# Patient Record
Sex: MALE | Race: WHITE | HISPANIC OR LATINO | Employment: UNEMPLOYED | URBAN - METROPOLITAN AREA
[De-identification: names, ages, dates, MRNs, and addresses within clinical notes are randomized per-mention and may not be internally consistent; named-entity substitution may affect disease eponyms.]

---

## 2017-04-11 ENCOUNTER — APPOINTMENT (OUTPATIENT)
Dept: RADIOLOGY | Facility: MEDICAL CENTER | Age: 67
DRG: 269 | End: 2017-04-11
Attending: ANESTHESIOLOGY
Payer: MEDICAID

## 2017-04-11 ENCOUNTER — APPOINTMENT (OUTPATIENT)
Dept: RADIOLOGY | Facility: MEDICAL CENTER | Age: 67
DRG: 269 | End: 2017-04-11
Attending: EMERGENCY MEDICINE
Payer: MEDICAID

## 2017-04-11 ENCOUNTER — HOSPITAL ENCOUNTER (INPATIENT)
Facility: MEDICAL CENTER | Age: 67
LOS: 10 days | DRG: 269 | End: 2017-04-21
Attending: EMERGENCY MEDICINE | Admitting: SURGERY
Payer: MEDICAID

## 2017-04-11 ENCOUNTER — HOSPITAL ENCOUNTER (OUTPATIENT)
Dept: RADIOLOGY | Facility: MEDICAL CENTER | Age: 67
End: 2017-04-11
Payer: MEDICAID

## 2017-04-11 DIAGNOSIS — I71.40 ABDOMINAL AORTIC ANEURYSM (AAA) WITHOUT RUPTURE (HCC): ICD-10-CM

## 2017-04-11 PROBLEM — I71.9 AORTIC ANEURYSM WITHOUT RUPTURE (HCC): Status: ACTIVE | Noted: 2017-04-11

## 2017-04-11 LAB
ABO GROUP BLD: NORMAL
ABO GROUP BLD: NORMAL
ALBUMIN SERPL BCP-MCNC: 3.9 G/DL (ref 3.2–4.9)
ALBUMIN/GLOB SERPL: 1.1 G/DL
ALP SERPL-CCNC: 94 U/L (ref 30–99)
ALT SERPL-CCNC: 15 U/L (ref 2–50)
ANION GAP SERPL CALC-SCNC: 9 MMOL/L (ref 0–11.9)
APTT PPP: 26.4 SEC (ref 24.7–36)
AST SERPL-CCNC: 14 U/L (ref 12–45)
BASOPHILS # BLD AUTO: 0.5 % (ref 0–1.8)
BASOPHILS # BLD: 0.04 K/UL (ref 0–0.12)
BILIRUB SERPL-MCNC: 0.6 MG/DL (ref 0.1–1.5)
BLD GP AB SCN SERPL QL: NORMAL
BUN SERPL-MCNC: 20 MG/DL (ref 8–22)
CALCIUM SERPL-MCNC: 9 MG/DL (ref 8.5–10.5)
CHLORIDE SERPL-SCNC: 103 MMOL/L (ref 96–112)
CO2 SERPL-SCNC: 26 MMOL/L (ref 20–33)
CREAT SERPL-MCNC: 1.08 MG/DL (ref 0.5–1.4)
EKG IMPRESSION: NORMAL
EOSINOPHIL # BLD AUTO: 0.18 K/UL (ref 0–0.51)
EOSINOPHIL NFR BLD: 2.1 % (ref 0–6.9)
ERYTHROCYTE [DISTWIDTH] IN BLOOD BY AUTOMATED COUNT: 41.3 FL (ref 35.9–50)
GFR SERPL CREATININE-BSD FRML MDRD: >60 ML/MIN/1.73 M 2
GLOBULIN SER CALC-MCNC: 3.6 G/DL (ref 1.9–3.5)
GLUCOSE SERPL-MCNC: 96 MG/DL (ref 65–99)
HCT VFR BLD AUTO: 48.2 % (ref 42–52)
HGB BLD-MCNC: 16.2 G/DL (ref 14–18)
IMM GRANULOCYTES # BLD AUTO: 0.02 K/UL (ref 0–0.11)
IMM GRANULOCYTES NFR BLD AUTO: 0.2 % (ref 0–0.9)
INR PPP: 1.03 (ref 0.87–1.13)
LYMPHOCYTES # BLD AUTO: 2.13 K/UL (ref 1–4.8)
LYMPHOCYTES NFR BLD: 25 % (ref 22–41)
MCH RBC QN AUTO: 28.4 PG (ref 27–33)
MCHC RBC AUTO-ENTMCNC: 33.6 G/DL (ref 33.7–35.3)
MCV RBC AUTO: 84.6 FL (ref 81.4–97.8)
MONOCYTES # BLD AUTO: 0.71 K/UL (ref 0–0.85)
MONOCYTES NFR BLD AUTO: 8.3 % (ref 0–13.4)
NEUTROPHILS # BLD AUTO: 5.44 K/UL (ref 1.82–7.42)
NEUTROPHILS NFR BLD: 63.9 % (ref 44–72)
NRBC # BLD AUTO: 0 K/UL
NRBC BLD AUTO-RTO: 0 /100 WBC
PLATELET # BLD AUTO: 157 K/UL (ref 164–446)
PMV BLD AUTO: 12.5 FL (ref 9–12.9)
POTASSIUM SERPL-SCNC: 3.5 MMOL/L (ref 3.6–5.5)
PROT SERPL-MCNC: 7.5 G/DL (ref 6–8.2)
PROTHROMBIN TIME: 13.8 SEC (ref 12–14.6)
RBC # BLD AUTO: 5.7 M/UL (ref 4.7–6.1)
RH BLD: NORMAL
SODIUM SERPL-SCNC: 138 MMOL/L (ref 135–145)
WBC # BLD AUTO: 8.5 K/UL (ref 4.8–10.8)

## 2017-04-11 PROCEDURE — 93005 ELECTROCARDIOGRAM TRACING: CPT | Performed by: EMERGENCY MEDICINE

## 2017-04-11 PROCEDURE — 503001 HCHG PERFUSION: Performed by: SURGERY

## 2017-04-11 PROCEDURE — 110382 HCHG SHELL REV 271: Performed by: SURGERY

## 2017-04-11 PROCEDURE — 04C00ZZ EXTIRPATION OF MATTER FROM ABDOMINAL AORTA, OPEN APPROACH: ICD-10-PCS | Performed by: SURGERY

## 2017-04-11 PROCEDURE — 110371 HCHG SHELL REV 272: Performed by: SURGERY

## 2017-04-11 PROCEDURE — 302128 INFUSION PUMP: Performed by: EMERGENCY MEDICINE

## 2017-04-11 PROCEDURE — 500122 HCHG BOVIE, BLADE: Performed by: SURGERY

## 2017-04-11 PROCEDURE — 96366 THER/PROPH/DIAG IV INF ADDON: CPT

## 2017-04-11 PROCEDURE — 700105 HCHG RX REV CODE 258: Performed by: EMERGENCY MEDICINE

## 2017-04-11 PROCEDURE — 500424 HCHG DRESSING, AIRSTRIP: Performed by: SURGERY

## 2017-04-11 PROCEDURE — 160009 HCHG ANES TIME/MIN: Performed by: SURGERY

## 2017-04-11 PROCEDURE — 82330 ASSAY OF CALCIUM: CPT

## 2017-04-11 PROCEDURE — 71010 DX-CHEST-LIMITED (1 VIEW): CPT

## 2017-04-11 PROCEDURE — 700111 HCHG RX REV CODE 636 W/ 250 OVERRIDE (IP): Performed by: EMERGENCY MEDICINE

## 2017-04-11 PROCEDURE — 160035 HCHG PACU - 1ST 60 MINS PHASE I: Performed by: SURGERY

## 2017-04-11 PROCEDURE — 501498 HCHG SURG-I-LOOP, MAXI (BLUE): Performed by: SURGERY

## 2017-04-11 PROCEDURE — 700111 HCHG RX REV CODE 636 W/ 250 OVERRIDE (IP)

## 2017-04-11 PROCEDURE — L8670 VASCULAR GRAFT, SYNTHETIC: HCPCS | Performed by: SURGERY

## 2017-04-11 PROCEDURE — 500043 HCHG BAG-A-JET: Performed by: SURGERY

## 2017-04-11 PROCEDURE — 700101 HCHG RX REV CODE 250

## 2017-04-11 PROCEDURE — 36415 COLL VENOUS BLD VENIPUNCTURE: CPT

## 2017-04-11 PROCEDURE — 160022 HCHG BLOCK: Performed by: SURGERY

## 2017-04-11 PROCEDURE — 700111 HCHG RX REV CODE 636 W/ 250 OVERRIDE (IP): Performed by: SURGERY

## 2017-04-11 PROCEDURE — 501445 HCHG STAPLER, SKIN DISP: Performed by: SURGERY

## 2017-04-11 PROCEDURE — 502240 HCHG MISC OR SUPPLY RC 0272: Performed by: SURGERY

## 2017-04-11 PROCEDURE — 500700 HCHG HEMOCLIP, SMALL (RED): Performed by: SURGERY

## 2017-04-11 PROCEDURE — 501837 HCHG SUTURE CV: Performed by: SURGERY

## 2017-04-11 PROCEDURE — 160031 HCHG SURGERY MINUTES - 1ST 30 MINS LEVEL 5: Performed by: SURGERY

## 2017-04-11 PROCEDURE — 160002 HCHG RECOVERY MINUTES (STAT): Performed by: SURGERY

## 2017-04-11 PROCEDURE — 500257: Performed by: SURGERY

## 2017-04-11 PROCEDURE — 85014 HEMATOCRIT: CPT

## 2017-04-11 PROCEDURE — C1894 INTRO/SHEATH, NON-LASER: HCPCS | Performed by: SURGERY

## 2017-04-11 PROCEDURE — 80053 COMPREHEN METABOLIC PANEL: CPT

## 2017-04-11 PROCEDURE — 501561 HCHG TRAY, EPIDURAL SINGLE SHOT: Performed by: SURGERY

## 2017-04-11 PROCEDURE — 501422 HCHG SPONGE, SURGIFOAM 100: Performed by: SURGERY

## 2017-04-11 PROCEDURE — 700105 HCHG RX REV CODE 258: Performed by: ANESTHESIOLOGY

## 2017-04-11 PROCEDURE — 700111 HCHG RX REV CODE 636 W/ 250 OVERRIDE (IP): Performed by: ANESTHESIOLOGY

## 2017-04-11 PROCEDURE — 500698 HCHG HEMOCLIP, MEDIUM: Performed by: SURGERY

## 2017-04-11 PROCEDURE — 501838 HCHG SUTURE GENERAL: Performed by: SURGERY

## 2017-04-11 PROCEDURE — 96365 THER/PROPH/DIAG IV INF INIT: CPT

## 2017-04-11 PROCEDURE — 86850 RBC ANTIBODY SCREEN: CPT

## 2017-04-11 PROCEDURE — 84295 ASSAY OF SERUM SODIUM: CPT

## 2017-04-11 PROCEDURE — 160048 HCHG OR STATISTICAL LEVEL 1-5: Performed by: SURGERY

## 2017-04-11 PROCEDURE — 74175 CTA ABDOMEN W/CONTRAST: CPT

## 2017-04-11 PROCEDURE — 85730 THROMBOPLASTIN TIME PARTIAL: CPT

## 2017-04-11 PROCEDURE — 85025 COMPLETE CBC W/AUTO DIFF WBC: CPT

## 2017-04-11 PROCEDURE — 770022 HCHG ROOM/CARE - ICU (200)

## 2017-04-11 PROCEDURE — 82947 ASSAY GLUCOSE BLOOD QUANT: CPT

## 2017-04-11 PROCEDURE — 500887 HCHG PACK, MAJOR BASIN: Performed by: SURGERY

## 2017-04-11 PROCEDURE — 160036 HCHG PACU - EA ADDL 30 MINS PHASE I: Performed by: SURGERY

## 2017-04-11 PROCEDURE — 04S90ZZ REPOSITION RIGHT RENAL ARTERY, OPEN APPROACH: ICD-10-PCS | Performed by: SURGERY

## 2017-04-11 PROCEDURE — 501499 HCHG SURG-I-LOOP, MINI (BLUE): Performed by: SURGERY

## 2017-04-11 PROCEDURE — 700117 HCHG RX CONTRAST REV CODE 255: Performed by: EMERGENCY MEDICINE

## 2017-04-11 PROCEDURE — 71010 DX-CHEST-PORTABLE (1 VIEW): CPT

## 2017-04-11 PROCEDURE — 86901 BLOOD TYPING SEROLOGIC RH(D): CPT

## 2017-04-11 PROCEDURE — 85610 PROTHROMBIN TIME: CPT

## 2017-04-11 PROCEDURE — 500697 HCHG HEMOCLIP, LARGE (ORANGE): Performed by: SURGERY

## 2017-04-11 PROCEDURE — 86900 BLOOD TYPING SEROLOGIC ABO: CPT

## 2017-04-11 PROCEDURE — A4606 OXYGEN PROBE USED W OXIMETER: HCPCS | Performed by: SURGERY

## 2017-04-11 PROCEDURE — 84132 ASSAY OF SERUM POTASSIUM: CPT

## 2017-04-11 PROCEDURE — 99291 CRITICAL CARE FIRST HOUR: CPT

## 2017-04-11 PROCEDURE — 501500 HCHG SURG-I-LOOP, SUPER MAXI (BLUE): Performed by: SURGERY

## 2017-04-11 PROCEDURE — 04SA0ZZ REPOSITION LEFT RENAL ARTERY, OPEN APPROACH: ICD-10-PCS | Performed by: SURGERY

## 2017-04-11 PROCEDURE — 160042 HCHG SURGERY MINUTES - EA ADDL 1 MIN LEVEL 5: Performed by: SURGERY

## 2017-04-11 PROCEDURE — 04100Z6 BYPASS ABDOMINAL AORTA TO RIGHT COMMON ILIAC ARTERY, OPEN APPROACH: ICD-10-PCS | Performed by: SURGERY

## 2017-04-11 PROCEDURE — 82803 BLOOD GASES ANY COMBINATION: CPT

## 2017-04-11 DEVICE — IMPLANTABLE DEVICE: Type: IMPLANTABLE DEVICE | Status: FUNCTIONAL

## 2017-04-11 RX ORDER — SODIUM CHLORIDE, SODIUM LACTATE, POTASSIUM CHLORIDE, CALCIUM CHLORIDE 600; 310; 30; 20 MG/100ML; MG/100ML; MG/100ML; MG/100ML
250 INJECTION, SOLUTION INTRAVENOUS PRN
Status: DISCONTINUED | OUTPATIENT
Start: 2017-04-11 | End: 2017-04-17

## 2017-04-11 RX ORDER — DIPHENHYDRAMINE HCL 25 MG
12.5 TABLET ORAL EVERY 6 HOURS PRN
Status: DISCONTINUED | OUTPATIENT
Start: 2017-04-11 | End: 2017-04-17

## 2017-04-11 RX ORDER — METOPROLOL TARTRATE 100 MG/1
100 TABLET ORAL 2 TIMES DAILY
Status: ON HOLD | COMMUNITY
End: 2019-08-23 | Stop reason: SDUPTHER

## 2017-04-11 RX ORDER — DIPHENHYDRAMINE HYDROCHLORIDE 50 MG/ML
12.5 INJECTION INTRAMUSCULAR; INTRAVENOUS EVERY 6 HOURS PRN
Status: DISCONTINUED | OUTPATIENT
Start: 2017-04-11 | End: 2017-04-17

## 2017-04-11 RX ORDER — NIFEDIPINE 30 MG
30 TABLET, EXTENDED RELEASE ORAL EVERY EVENING
COMMUNITY
End: 2017-05-09

## 2017-04-11 RX ORDER — MIDAZOLAM HYDROCHLORIDE 1 MG/ML
INJECTION INTRAMUSCULAR; INTRAVENOUS
Status: COMPLETED
Start: 2017-04-11 | End: 2017-04-11

## 2017-04-11 RX ORDER — SODIUM CHLORIDE 9 MG/ML
1000 INJECTION, SOLUTION INTRAVENOUS ONCE
Status: COMPLETED | OUTPATIENT
Start: 2017-04-11 | End: 2017-04-11

## 2017-04-11 RX ORDER — DIPHENHYDRAMINE HYDROCHLORIDE 50 MG/ML
25 INJECTION INTRAMUSCULAR; INTRAVENOUS EVERY 6 HOURS PRN
Status: DISCONTINUED | OUTPATIENT
Start: 2017-04-11 | End: 2017-04-13

## 2017-04-11 RX ORDER — LABETALOL HYDROCHLORIDE 5 MG/ML
10 INJECTION, SOLUTION INTRAVENOUS
Status: DISCONTINUED | OUTPATIENT
Start: 2017-04-11 | End: 2017-04-21 | Stop reason: HOSPADM

## 2017-04-11 RX ORDER — ACETAMINOPHEN 650 MG/1
975 SUPPOSITORY RECTAL EVERY 6 HOURS
Status: DISCONTINUED | OUTPATIENT
Start: 2017-04-11 | End: 2017-04-14

## 2017-04-11 RX ORDER — ONDANSETRON 2 MG/ML
4 INJECTION INTRAMUSCULAR; INTRAVENOUS EVERY 4 HOURS PRN
Status: DISCONTINUED | OUTPATIENT
Start: 2017-04-11 | End: 2017-04-11

## 2017-04-11 RX ORDER — ASPIRIN 325 MG
325 TABLET ORAL DAILY
COMMUNITY

## 2017-04-11 RX ORDER — ESMOLOL HYDROCHLORIDE 10 MG/ML
50 INJECTION, SOLUTION INTRAVENOUS CONTINUOUS
Status: DISCONTINUED | OUTPATIENT
Start: 2017-04-11 | End: 2017-04-12

## 2017-04-11 RX ORDER — ACETAMINOPHEN 500 MG
1000 TABLET ORAL EVERY 6 HOURS
Status: DISCONTINUED | OUTPATIENT
Start: 2017-04-11 | End: 2017-04-17

## 2017-04-11 RX ORDER — NALOXONE HYDROCHLORIDE 0.4 MG/ML
0.1 INJECTION, SOLUTION INTRAMUSCULAR; INTRAVENOUS; SUBCUTANEOUS
Status: DISCONTINUED | OUTPATIENT
Start: 2017-04-11 | End: 2017-04-17

## 2017-04-11 RX ORDER — DEXTROSE, SODIUM CHLORIDE, SODIUM LACTATE, POTASSIUM CHLORIDE, AND CALCIUM CHLORIDE 5; .6; .31; .03; .02 G/100ML; G/100ML; G/100ML; G/100ML; G/100ML
INJECTION, SOLUTION INTRAVENOUS CONTINUOUS
Status: DISCONTINUED | OUTPATIENT
Start: 2017-04-11 | End: 2017-04-19

## 2017-04-11 RX ORDER — ENALAPRIL MALEATE 10 MG/1
10 TABLET ORAL 2 TIMES DAILY
COMMUNITY
End: 2017-08-14

## 2017-04-11 RX ORDER — DEXAMETHASONE SODIUM PHOSPHATE 4 MG/ML
4 INJECTION, SOLUTION INTRA-ARTICULAR; INTRALESIONAL; INTRAMUSCULAR; INTRAVENOUS; SOFT TISSUE
Status: COMPLETED | OUTPATIENT
Start: 2017-04-11 | End: 2017-04-12

## 2017-04-11 RX ORDER — FAMOTIDINE 20 MG/1
20 TABLET, FILM COATED ORAL EVERY 12 HOURS
Status: DISCONTINUED | OUTPATIENT
Start: 2017-04-11 | End: 2017-04-15

## 2017-04-11 RX ORDER — SODIUM CHLORIDE, SODIUM LACTATE, POTASSIUM CHLORIDE, CALCIUM CHLORIDE 600; 310; 30; 20 MG/100ML; MG/100ML; MG/100ML; MG/100ML
1000 INJECTION, SOLUTION INTRAVENOUS ONCE
Status: COMPLETED | OUTPATIENT
Start: 2017-04-11 | End: 2017-04-11

## 2017-04-11 RX ORDER — ONDANSETRON 2 MG/ML
4 INJECTION INTRAMUSCULAR; INTRAVENOUS EVERY 4 HOURS PRN
Status: DISCONTINUED | OUTPATIENT
Start: 2017-04-11 | End: 2017-04-21 | Stop reason: HOSPADM

## 2017-04-11 RX ORDER — HALOPERIDOL 5 MG/ML
1 INJECTION INTRAMUSCULAR EVERY 6 HOURS PRN
Status: DISCONTINUED | OUTPATIENT
Start: 2017-04-11 | End: 2017-04-17

## 2017-04-11 RX ORDER — HYDRALAZINE HYDROCHLORIDE 20 MG/ML
10 INJECTION INTRAMUSCULAR; INTRAVENOUS
Status: DISCONTINUED | OUTPATIENT
Start: 2017-04-11 | End: 2017-04-21 | Stop reason: HOSPADM

## 2017-04-11 RX ORDER — SCOLOPAMINE TRANSDERMAL SYSTEM 1 MG/1
1 PATCH, EXTENDED RELEASE TRANSDERMAL
Status: DISCONTINUED | OUTPATIENT
Start: 2017-04-11 | End: 2017-04-17

## 2017-04-11 RX ORDER — SODIUM CHLORIDE 9 MG/ML
INJECTION, SOLUTION INTRAVENOUS
Status: DISCONTINUED | OUTPATIENT
Start: 2017-04-11 | End: 2017-04-11 | Stop reason: HOSPADM

## 2017-04-11 RX ORDER — LOSARTAN POTASSIUM 100 MG/1
100 TABLET ORAL EVERY EVENING
COMMUNITY
End: 2017-05-09

## 2017-04-11 RX ORDER — SODIUM CHLORIDE, SODIUM LACTATE, POTASSIUM CHLORIDE, CALCIUM CHLORIDE 600; 310; 30; 20 MG/100ML; MG/100ML; MG/100ML; MG/100ML
500 INJECTION, SOLUTION INTRAVENOUS CONTINUOUS
Status: DISCONTINUED | OUTPATIENT
Start: 2017-04-12 | End: 2017-04-13

## 2017-04-11 RX ADMIN — FENTANYL CITRATE 50 MCG: 50 INJECTION, SOLUTION INTRAMUSCULAR; INTRAVENOUS at 20:30

## 2017-04-11 RX ADMIN — SODIUM CHLORIDE, SODIUM LACTATE, POTASSIUM CHLORIDE, CALCIUM CHLORIDE 1000 ML: 600; 310; 30; 20 INJECTION, SOLUTION INTRAVENOUS at 23:00

## 2017-04-11 RX ADMIN — HYDROMORPHONE HYDROCHLORIDE 0.5 MG: 1 INJECTION, SOLUTION INTRAMUSCULAR; INTRAVENOUS; SUBCUTANEOUS at 21:25

## 2017-04-11 RX ADMIN — HYDROMORPHONE HYDROCHLORIDE 0.5 MG: 1 INJECTION, SOLUTION INTRAMUSCULAR; INTRAVENOUS; SUBCUTANEOUS at 21:45

## 2017-04-11 RX ADMIN — FENTANYL CITRATE 50 MCG: 50 INJECTION, SOLUTION INTRAMUSCULAR; INTRAVENOUS at 21:10

## 2017-04-11 RX ADMIN — HYDROMORPHONE HYDROCHLORIDE 0.5 MG: 1 INJECTION, SOLUTION INTRAMUSCULAR; INTRAVENOUS; SUBCUTANEOUS at 21:19

## 2017-04-11 RX ADMIN — FENTANYL CITRATE 50 MCG: 50 INJECTION, SOLUTION INTRAMUSCULAR; INTRAVENOUS at 20:35

## 2017-04-11 RX ADMIN — MIDAZOLAM 2 MG: 1 INJECTION INTRAMUSCULAR; INTRAVENOUS at 22:45

## 2017-04-11 RX ADMIN — SODIUM CHLORIDE 1000 ML: 9 INJECTION, SOLUTION INTRAVENOUS at 11:12

## 2017-04-11 RX ADMIN — ESMOLOL HYDROCHLORIDE 50 MCG/KG/MIN: 10 INJECTION INTRAVENOUS at 11:20

## 2017-04-11 RX ADMIN — FENTANYL CITRATE 50 MCG: 50 INJECTION, SOLUTION INTRAMUSCULAR; INTRAVENOUS at 21:05

## 2017-04-11 RX ADMIN — ROPIVACAINE HYDROCHLORIDE: 10 INJECTION, SOLUTION EPIDURAL at 20:50

## 2017-04-11 RX ADMIN — IOHEXOL 100 ML: 350 INJECTION, SOLUTION INTRAVENOUS at 11:38

## 2017-04-11 RX ADMIN — ROPIVACAINE HYDROCHLORIDE: 10 INJECTION, SOLUTION EPIDURAL at 23:50

## 2017-04-11 RX ADMIN — HYDROMORPHONE HYDROCHLORIDE 0.5 MG: 1 INJECTION, SOLUTION INTRAMUSCULAR; INTRAVENOUS; SUBCUTANEOUS at 21:40

## 2017-04-11 ASSESSMENT — PAIN SCALES - GENERAL
PAINLEVEL_OUTOF10: 9
PAINLEVEL_OUTOF10: 0
PAINLEVEL_OUTOF10: 3
PAINLEVEL_OUTOF10: 2
PAINLEVEL_OUTOF10: 0
PAINLEVEL_OUTOF10: 0
PAINLEVEL_OUTOF10: 9
PAINLEVEL_OUTOF10: 9
PAINLEVEL_OUTOF10: 10

## 2017-04-11 ASSESSMENT — ENCOUNTER SYMPTOMS
ABDOMINAL PAIN: 1
HEADACHES: 0

## 2017-04-11 NOTE — IP AVS SNAPSHOT
4/21/2017    Danny Antony  [address] [address]  [place] [address]    Dear Danny:    [facility] wants to ensure your discharge home is safe and you or your loved ones have had all of your questions answered regarding your care after you leave the hospital.    Below is a list of resources and contact information should you have any questions regarding your hospital stay, follow-up instructions, or active medical symptoms.    Questions or Concerns Regarding… Contact   Medical Questions Related to Your Discharge  (7 days a week, 8am-5pm) Contact a Nurse Care Coordinator   [phone]   Medical Questions Not Related to Your Discharge  (24 hours a day / 7 days a week)  Contact the Nurse Health Line   [phone]    Medications or Discharge Instructions Refer to your discharge packet   or contact your [facility] Primary Care Provider   [phone]   Follow-up Appointment(s) Schedule your appointment via [employer]   or contact Scheduling [phone]   Billing Review your statement via [employer]  or contact Billing [phone]   Medical Records Review your records via [employer]   or contact Medical Records [phone]     You may receive a telephone call within two days of discharge. This call is to make certain you understand your discharge instructions and have the opportunity to have any questions answered. You can also easily access your medical information, test results and upcoming appointments via the [employer] free online health management tool. You can learn more and sign up at [employer]. For assistance setting up your [employer] account, please call [phone].    Once again, we want to ensure your discharge home is safe and that you have a clear understanding of any next steps in your care. If you have any questions or concerns, please do not hesitate to contact us, we are here for you. Thank you for choosing [facility] for your healthcare needs.    Sincerely,    Your [facility] Healthcare Team

## 2017-04-11 NOTE — ED NOTES
To MCGHEE from [facility] called to discuss pt's poc. Pt will remain in ER until pre-op comes for the pt, he will go to [facility] post surgery.

## 2017-04-11 NOTE — IP AVS SNAPSHOT
[employer] Access Code: [email or web address]  Expires: 5/21/2017  5:30 PM    Your email address is not on file at [facility].  Email Addresses are required for you to sign up for [employer], please contact [phone] to verify your personal information and to provide your email address prior to attempting to register for [employer].    Dannybrady Antony  [address] [address]  [place] [address]    [employer]  A secure, online tool to manage your health information     [facility]’s [employer]® is a secure, online tool that connects you to your personalized health information from the privacy of your home -- day or night - making it very easy for you to manage your healthcare. Once the activation process is completed, you can even access your medical information using the [employer] aashish, which is available for free in the [employer] Aashish store or Google Play store.     To learn more about [employer], visit [email or web address]    There are two levels of access available (as shown below):   My Chart Features  [facility] Primary Care Doctor [facility]  Specialists [facility]  Urgent  Care Non-[facility] Primary Care Doctor   Email your healthcare team securely and privately 24/7 X X X    Manage appointments: schedule your next appointment; view details of past/upcoming appointments X      Request prescription refills. X      View recent personal medical records, including lab and immunizations X X X X   View health record, including health history, allergies, medications X X X X   Read reports about your outpatient visits, procedures, consult and ER notes X X X X   See your discharge summary, which is a recap of your hospital and/or ER visit that includes your diagnosis, lab results, and care plan X X  X     How to register for [employer]:  Once your e-mail address has been verified, follow the following steps to sign up for [employer].     1. Go to  [email or web address]  2. Click on the Sign Up Now box, which takes you to the New Member Sign Up page. You will  need to provide the following information:  a. Enter your [employer] Access Code exactly as it appears at the top of this page. (You will not need to use this code after you’ve completed the sign-up process. If you do not sign up before the expiration date, you must request a new code.)   b. Enter your date of birth.   c. Enter your home email address.   d. Click Submit, and follow the next screen’s instructions.  3. Create a [employer] ID. This will be your [employer] login ID and cannot be changed, so think of one that is secure and easy to remember.  4. Create a [employer] password. You can change your password at any time.  5. Enter your Password Reset Question and Answer. This can be used at a later time if you forget your password.   6. Enter your e-mail address. This allows you to receive e-mail notifications when new information is available in [employer].  7. Click Sign Up. You can now view your health information.    For assistance activating your [employer] account, call [phone]

## 2017-04-11 NOTE — IP AVS SNAPSHOT
" <p align=\"LEFT\"><IMG SRC=\"//[email or web address]\" alt=\"Image\" WIDTH=\"50%\" HEIGHT=\"200\" BORDER=\"\"></p>                   Name:Danny Antony  Medical Record Number:[id number]  CSN: [id number]    YOB: 1950   Age: 67 y.o.  Sex: male  HT:1.803 m (5' 11\") WT: 75.3 kg (166 lb 0.1 oz)          Admit Date: 4/11/2017     Discharge Date:   Today's Date: 4/21/2017  Attending Doctor:  Charissa Kerr M.D.                  Allergies:  Review of patient's allergies indicates no known allergies.          Follow-up Information     1. Follow up with Charissa Kerr M.D. In 1 week.    Specialties:  Surgery, Radiology    Contact information    [address] [address]  [phone]           Medication List      Take these Medications        Instructions    aspirin 325 MG Tabs   Commonly known as:  ASA    Take 325 mg by mouth every 6 hours as needed for Mild Pain.   Dose:  325 mg       enalapril 10 MG Tabs   Commonly known as:  VASOTEC    Take 10 mg by mouth 2 times a day.   Dose:  10 mg       IMDUR PO    Take 5 mg by mouth. Indications: Buys medication in [place]:  called Isorbid   Dose:  5 mg       losartan 100 MG Tabs   Commonly known as:  COZAAR    Take 100 mg by mouth every evening.   Dose:  100 mg       * METOCLOPRAMIDE HCL PO    Take 1 Tab by mouth every evening. Pt is unsure of the dose, pt gets all his medications from [place]   Dose:  1 Tab       * metoclopramide 10 MG Tabs   Commonly known as:  REGLAN    Take 10 mg by mouth 4 times a day.   Dose:  10 mg       metoprolol 100 MG Tabs   Commonly known as:  LOPRESSOR    Take 100 mg by mouth 2 times a day.   Dose:  100 mg       NIFEdipine 30 MG CR tablet   Commonly known as:  ADALAT CC    Take 30 mg by mouth every evening.   Dose:  30 mg       oxycodone-acetaminophen 7.5-325 MG per tablet   Commonly known as:  PERCOCET    Take 1-2 Tabs by mouth every four hours as needed for Moderate Pain.   Dose:  1-2 Tab       PENTOXIFYLLINE PO    Take " 400 mg by mouth 2 Times a Day.   Dose:  400 mg       * Notice:  This list has 2 medication(s) that are the same as other medications prescribed for you. Read the directions carefully, and ask your doctor or other care provider to review them with you.

## 2017-04-11 NOTE — ED NOTES
Pt went to CT, VS remained stable, he has returned to his room family is at the bedside. Xray has been notified of pt's return.

## 2017-04-11 NOTE — ED NOTES
"Med rec updated and complete  Allergies reviewed  Pt gets all of his medications from [place], pt does not know what pharmacy he wants to  from.  Pt states \"No vitamins\".  Pt states \"No antibiotics in the last 30 days\".     "

## 2017-04-11 NOTE — IP AVS SNAPSHOT
" Home Care Instructions                                                                                                                  Name:Danny Antony  Medical Record Number:[id number]  CSN: [id number]    YOB: 1950   Age: 67 y.o.  Sex: male  HT:1.803 m (5' 11\") WT: 75.3 kg (166 lb 0.1 oz)          Admit Date: 4/11/2017     Discharge Date:   Today's Date: 4/21/2017  Attending Doctor:  Charissa Kerr M.D.                  Allergies:  Review of patient's allergies indicates no known allergies.            Discharge Instructions       Discharge Instructions    Discharged to home by car with relative. Discharged via wheelchair, hospital escort: Yes.  Special equipment needed: Not Applicable    Be sure to schedule a follow-up appointment with your primary care doctor or any specialists as instructed.     Discharge Plan:   Diet Plan: Discussed  Activity Level: Discussed  Confirmed Follow up Appointment: Patient to Call and Schedule Appointment  Confirmed Symptoms Management: Discussed  Medication Reconciliation Updated: Yes  Influenza Vaccine Indication: Indicated: 65 years and older  Influenza Vaccine Given - only chart on this line when given: Influenza Vaccine Given (See MAR)    Entiendo que se recomienda kasie dieta baja en colesterol, grasa y sodio para kasie buena skyler. A menos que me hayan dado instrucciones específicas a continuación para otra dieta, acepto esta instrucción sammie receta de mi dieta. Otra dieta: Dieta regular según lo tolerado Instrucciones especiales: Monitorear los signos y síntomas de infección (fiebre, escalofríos, náuseas, vómitos) Monitorear las incisiones de hinchazón, enrojecimiento o drenaje ¿El paciente es Post Transfusión de Mario? Sí INFORMACIÓN DE LA TRANSFUSIÓN POSTERIOR A LA MARIO (ADULTO) El propósito de la transfusión de mario es corregir la anemia, bajos niveles de factores de coagulación sanguínea o corregir la pérdida aguda de mario. La transfusión de mario " es muy ayers, silvia ocasionalmente se producen reacciones adversas inesperadas. La mayoría de las reacciones adversas ocurren lm la transfusión, dentro de un a dos días después de la transfusión o de kasie a dos semanas después de la transfusión. Algunas reacciones adversas pueden ocurrir de harman a seis meses después de la transfusión y algunos incluso años después. Si alguno de los síntomas enumerados a continuación le sucede lm malik transfusión, Por favor notifique a malik enfermera inmediatamente. Fiebre o escalofríos Enrojecimiento de la ale Urticaria, sarpullido o picazón Dificultad para respirar o falta de aire Dolor o exudado de mario del sitio de la aguja IV Dolor lumbar Náuseas o vómitos Debilidad o desmayos Dolor de pecho Mario en la orina Disminución de la frecuencia de la micción Los síntomas anteriores también pueden ocurrir dentro de los 24 a 48 después de la transfusión; Si es así, notifique a malik médico. Color amarillento de la piel [place] puede suceder de harman a seis meses después de la transfusión; Si es así, notifique a malik médico      I understand that a diet low in cholesterol, fat, and sodium is recommended for good health. Unless I have been given specific instructions below for another diet, I accept this instruction as my diet prescription.   Other diet: Regular diet as tolerated    Special Instructions: Monitor for signs and symptoms of infection (fever, chills, nausea, vomiting)  Monitor incisions for swelling, redness, or drainage      · Is patient Post Blood Transfusion?  Yes  POST BLOOD TRANSFUSION INFORMATION (ADULT)    The purpose of blood transfusion is to correct anemia, low levels of blood clotting factors or to correct acute blood loss.   Blood transfusion is very safe but occasionally unexpected adverse reactions do occur. Most adverse reactions occur during transfusion, within one to two days following transfusion or one to two weeks following transfusion. Some adverse reactions  can occur one to six months after transfusion and some even years later.             If any of the symptoms listed below happen to you during your transfusion,                                 please notify your nurse immediately.   · Fever or Chills  · Flushing of the Face  · Hives, rash or itching  · Difficulty in breathing or shortness of breath  · Pain or oozing of blood from the IV needle site  · Low back pain  · Nausea or vomiting  · Weakness or fainting  · Chest pain  · Blood in the urine  · Decreased frequency of urination    The above symptoms may also occur within 24 to 48 after transfusion; if so, notify your physician.     · Yellowing of the skin    This can happen one to six months after transfusion; if so, notify your physician    Angiografía  (Angiogram)  La angiografía es un procedimiento que se utiliza para observar los vasos sanguíneos. En camilo procedimiento, se inyecta kasie sustancia de contraste a través de un tubo luis y colin (catéter) colocado en kasie arteria. Luego se kristin imágenes con jamin X. Con los jamin X se observará si hay kasie obstrucción o un problema en un vaso sanguíneo.   INFORME A BELTRAN MÉDICO:  · Las alergias que tenga, incluidas alergias a mariscos o a sustancias de contraste.  · Todos los medicamentos que utiliza, incluidos vitaminas, hierbas, gotas oftálmicas, cremas y medicamentos de venta prabhu.  · Problemas previos que usted o los miembros de beltran sherlyn hayan tenido con el uso de anestésicos.  · Enfermedades de la mario.  · Cirugías previas.  · Cualquier problema o insuficiencia renal que haya sufrido.  · Enfermedades.  · Posibilidad de embarazo, si corresponde.  RIESGOS Y COMPLICACIONES  En general, la angiografía es un procedimiento seguro. Sin embargo, sammie en cualquier procedimiento, pueden surgir problemas. Estos son algunos posibles problemas:  · Lesiones en los vasos sanguíneos, incluyendo ruptura o sangrado.  · Infección o hematoma en el sitio de inserción del  catéter.  · Reacción alérgica a la sustancia de contraste.  · Lesión renal debido a la tintura o sustancia de contraste.  · Coágulos que pueden conducir a un ictus o a un infarto.  ANTES DEL PROCEDIMIENTO  · No coma ni brannon nada después de la medianoche anterior al procedimiento, o según le haya indicado malik médico.  · Consulte a malik médico si puede beber la cantidad suficiente de agua para ernestina los medicamentos necesarios en la mañana del procedimiento.  PROCEDIMIENTO  · Pueden administrarle un medicamento que lo ayudará a relajarse (sedante) antes y lm el procedimiento. Se lo administrarán a través de un catéter por vía intravenosa (IV) que se inserta en kasie de las venas.  · La momo en la que se insertará el catéter se lava y se rasura. Generalmente se inserta en la tylor, silvia puede insertarse en el pliegue del brazo (cerca del codo) o en la isac.  · Le aplicarán un medicamento para adormecer la momo en la que se insertará el catéter (anestesia local).  · El catéter se insertará en kasie arteria con kasie guía. El catéter se guía utilizando un tipo especial de radiografía (fluoroscopia) del vaso sanguíneo que se examina.  · Luego se inserta kasie sustancia de contraste especial en el catéter y se kristin imágenes de jamin X. Esta sustancia muestra si hay estrechamientos u obstrucciones.  DESPUÉS DEL PROCEDIMIENTO   · Si el procedimiento se realiza a través de la pierna, deberá permanecer acostado en la cama lm varias horas. Le indicarán que no flexione ni cruce las piernas.  · El lugar de la inserción será controlado con frecuencia.  · Le controlarán con frecuencia el pulso en el pie o en la isac.  · Le harán análisis de mario, radiografías y electrocardiogramas adicionales.  · Es posible que deba permanecer en observación en el hospital lm la noche.     Esta información no tiene sammie fin reemplazar el consejo del médico. Asegúrese de hacerle al médico cualquier pregunta que tenga.     Document  Released: 09/27/2006 Document Revised: 01/08/2016  [employer] Interactive Patient Education ©2016 [employer]    Angiogram  An angiogram, also called angiography, is a procedure used to look at the blood vessels. In this procedure, dye is injected through a long, thin tube (catheter) into an artery. X-rays are then taken. The X-rays will show if there is a blockage or problem in a blood vessel.   LET YOUR HEALTH CARE PROVIDER KNOW ABOUT:  · Any allergies you have, including allergies to shellfish or contrast dye.    · All medicines you are taking, including vitamins, herbs, eye drops, creams, and over-the-counter medicines.    · Previous problems you or members of your family have had with the use of anesthetics.    · Any blood disorders you have.    · Previous surgeries you have had.  · Any previous kidney problems or failure you have had.   · Medical conditions you have.    · Possibility of pregnancy, if this applies.  RISKS AND COMPLICATIONS  Generally, an angiogram is a safe procedure. However, as with any procedure, problems can occur. Possible problems include:  · Injury to the blood vessels, including rupture or bleeding.  · Infection or bruising at the catheter site.  · Allergic reaction to the dye or contrast used.  · Kidney damage from the dye or contrast used.  · Blood clots that can lead to a stroke or heart attack.  BEFORE THE PROCEDURE  · Do not eat or drink after midnight on the night before the procedure, or as directed by your health care provider.    · Ask your health care provider if you may drink enough water to take any needed medicines the morning of the procedure.    PROCEDURE  · You may be given a medicine to help you relax (sedative) before and during the procedure. This medicine is given through an IV access tube that is inserted into one of your veins.    · The area where the catheter will be inserted will be washed and shaved. This is usually done in the groin but may be done in the fold  of your arm (near your elbow) or in the wrist.  · A medicine will be given to numb the area where the catheter will be inserted (local anesthetic).  · The catheter will be inserted with a guide wire into an artery. The catheter is guided by using a type of X-ray (fluoroscopy) to the blood vessel being examined.    · Dye is then injected into the catheter, and X-rays are taken. The dye helps to show where any narrowing or blockages are located.    AFTER THE PROCEDURE   · If the procedure is done through the leg, you will be kept in bed lying flat for several hours. You will be instructed to not bend or cross your legs.  · The insertion site will be checked frequently.  · The pulse in your feet or wrist will be checked frequently.  · Additional blood tests, X-rays, and electrocardiography may be done.    · You may need to stay in the hospital overnight for observation.       This information is not intended to replace advice given to you by your health care provider. Make sure you discuss any questions you have with your health care provider.     Document Released: 09/27/2006 Document Revised: 01/08/2016 Document Reviewed: 05/21/2014  [employer] Interactive Patient Education ©2016 [employer] Inc.    Angiograma, angioplastia, colocación de stent, cuidados luego del procedimiento  (Angiogram, Angioplasty, or Stent Placement, Care After)  En el día de hoy le lopez realizado alguno de los siguientes procedimientos:  ANGIOGRAMA:  Le lopez colocado un catéter en la vena de la tylor, le lopez inyectado kasie sustancia de contraste y lopez tomado imágenes.  ANGIOPLASTIA:  Le lopez colocado un catéter en la vena de la tylor y lo lopez dirigido hacia el lugar de la obstrucción al flujo sanguíneo. Para abrir la obstrucción se utilizó un balón o un stent de metal. Si no existen otras obstrucciones en la momo, los síntomas deben mejorar. Si hay kasie obstrucción por debajo de aubrey momo, podrá ser necesaria kasie cirugía.  STENT:  Le lopez colocado un  catéter en la tylor a través del cual se insertó un tubo metálico en la momo de estrechamiento de la arteria para facilitar el flujo sanguíneo.  Le administraron sedantes por vía intravenosa. Estos medicamentos son eliminados rápidamente del torrente sanguíneo. Podrá sentir kasie molestia en el sitio de la inserción cuando termine el efecto del anestésico local. [place] mejora gradualmente luego de algunos días.  · Sólo tome medicamentos de venta prabhu o prescriptos para calmar el dolor, las molestias, o bajar la fiebre según las indicaciones de malik médico.  · Las complicaciones no son frecuentes en camilo procedimiento. Diríjase al servicio de emergencias más cercano si desarrolla alguno de los siguientes síntomas:  · Dolor que empeora.  · Hemorragias.  · Hinchazón en el lugar de la punción.  · Mareos.  · Vahídos o desmayos.  · Fiebre o escalofríos.  · Si la herida le supura, le sangra o se le hincha aplique kasie presión firme y continua directamente sobre el sitio lm quince minutos y concurra al servicio de emergencias.  · Mantenga seca la piel que rodea el sitio de inserción. Puede ernestina kasie ducha después de 24 horas. Si la momo se moja, séquela con cuidado Evite los phuong hasta que el sitio de inserción se cure, generalmente luego de 5 a 7 días.  · La presencia de enrojecimiento, aumento del dolor o la hinchazón pueden ser signos de infección en la piel. Póngase en contacto con malik médico.  · Oanh reposo lm el mary del día y evite levantar objetos pesados (más de 5 kg) No opere maquinarias pesadas, no conduzca automóviles ni tome decisiones legales lm las primeras 24 horas luego del procedimiento. Oanh que kasie persona responsable lo lleve hasta malik casa.  · Puede reanudar malik dieta habitual luego del procedimiento. Evite las bebidas alcohólicas en las 24 horas posteriores  Document Released: 04/03/2008 Document Revised: 03/11/2013  [place]. [employer]. cn® Patient Information ©2014 [employer]    Angiogram, Angioplasty,  or Stent Placement  Care After  One of the following procedures was done today.  ANGIOGRAM:  A catheter was placed through the blood vessel in your groin, contrast was injected into the vessels, and pictures were taken.  ANGIOPLASTY:  A catheter was placed through the blood vessel in your groin and directed to an area of blocked blood flow. A balloon, and possibly a metal stent were used to open the blockage. If no other blockages are present below this area, your symptoms should improve. If blockages are present below this area, surgery may still be necessary.  STENT:  A catheter was placed in your groin through which a metal mesh tube was placed in a narrowed part of the artery to facilitate blood flow.  You were given intravenous sedation. These medications are rapidly cleared from your bloodstream. You may feel some discomfort at the insertion site after the local anesthetic wears off. This discomfort should gradually improve over the next several days.  · Only take over-the-counter or prescription medicines for pain, discomfort, or fever as directed by your caregiver.  · Complications are very uncommon after this procedure. Go to the nearest emergency department if you develop any of the following symptoms:  · Worsening pain.  · Bleeding.  · Swelling at the puncture site.  · Lightheadedness.  · Dizziness or fainting.  · Fever or chills.  · If oozing, bleeding, or a lump appears at the puncture site, apply firm pressure directly to the site steadily for 15 minutes and go to the emergency department.  · Keep the skin around the insertion site dry. You may take showers after 24 hours. If the area does get wet, dry the skin completely. Avoid baths until the skin puncture site heals, usually 5 to 7 days.  · Development of redness, increased soreness, or swelling may be signs of a skin infection. Contact your physician.  · Rest for the remainder of the day and avoid any heavy lifting (more than 10 pounds or 4.5 kg).  Do not operate heavy machinery, drive, or make legal decisions for the first 24 hours after the procedure. Have a responsible person drive you home.  · You may resume your usual diet after the procedure. Avoid alcoholic beverages for 24 hours after the procedure.  Document Released: 12/18/2006 Document Revised: 03/11/2013 Document Reviewed: 10/17/2007  [employer]® Patient Information ©2014 [employer].      Depression / Suicide Risk    As you are discharged from this [facility] facility, it is important to learn how to keep safe from harming yourself.    Recognize the warning signs:  · Abrupt changes in personality, positive or negative- including increase in energy   · Giving away possessions  · Change in eating patterns- significant weight changes-  positive or negative  · Change in sleeping patterns- unable to sleep or sleeping all the time   · Unwillingness or inability to communicate  · Depression  · Unusual sadness, discouragement and loneliness  · Talk of wanting to die  · Neglect of personal appearance   · Rebelliousness- reckless behavior  · Withdrawal from people/activities they love  · Confusion- inability to concentrate     If you or a loved one observes any of these behaviors or has concerns about self-harm, here's what you can do:  · Talk about it- your feelings and reasons for harming yourself  · Remove any means that you might use to hurt yourself (examples: pills, rope, extension cords, firearm)  · Get professional help from the community (Mental Health, Substance Abuse, psychological counseling)  · Do not be alone:Call your Safe Contact- someone whom you trust who will be there for you.  · Call your local CRISIS HOTLINE 322-1880 or 250-482-2059  · Call your local Children's Mobile Crisis Response Team [place] (362) 454-6733 or www.[employer]  · Call the toll free National Suicide Prevention Hotlines   · National Suicide Prevention Lifeline 101-250-FSCM (9731)  · National Hope Line  Network 800-SUICIDE (775-1837)        Follow-up Information     1. Follow up with Charissa Kerr M.D. In 1 week.    Specialties:  Surgery, Radiology    Contact information    [address]  [address]  [place] [address]  [phone]           Discharge Medication Instructions:    Below are the medications your physician expects you to take upon discharge:    Review all your home medications and newly ordered medications with your doctor and/or pharmacist. Follow medication instructions as directed by your doctor and/or pharmacist.    Please keep your medication list with you and share with your physician.               Medication List      START taking these medications        Instructions    Morning Afternoon Evening Bedtime    oxycodone-acetaminophen 7.5-325 MG per tablet   Commonly known as:  PERCOCET        Take 1-2 Tabs by mouth every four hours as needed for Moderate Pain.   Dose:  1-2 Tab                          CONTINUE taking these medications        Instructions    Morning Afternoon Evening Bedtime    aspirin 325 MG Tabs   Commonly known as:  ASA        Take 325 mg by mouth every 6 hours as needed for Mild Pain.   Dose:  325 mg                        enalapril 10 MG Tabs   Last time this was given:  10 mg on 4/21/2017  8:25 AM   Commonly known as:  VASOTEC        Take 10 mg by mouth 2 times a day.   Dose:  10 mg                        IMDUR PO        Take 5 mg by mouth. Indications: Buys medication in [place]:  called Isorbid   Dose:  5 mg                        losartan 100 MG Tabs   Commonly known as:  COZAAR        Take 100 mg by mouth every evening.   Dose:  100 mg                        * METOCLOPRAMIDE HCL PO        Take 1 Tab by mouth every evening. Pt is unsure of the dose, pt gets all his medications from [place]   Dose:  1 Tab                        * metoclopramide 10 MG Tabs   Commonly known as:  REGLAN        Take 10 mg by mouth 4 times a day.   Dose:  10 mg                        metoprolol 100  MG Tabs   Last time this was given:  100 mg on 4/21/2017  8:25 AM   Commonly known as:  LOPRESSOR        Take 100 mg by mouth 2 times a day.   Dose:  100 mg                        NIFEdipine 30 MG CR tablet   Last time this was given:  30 mg on 4/20/2017  8:40 PM   Commonly known as:  ADALAT CC        Take 30 mg by mouth every evening.   Dose:  30 mg                        PENTOXIFYLLINE PO        Take 400 mg by mouth 2 Times a Day.   Dose:  400 mg                        * Notice:  This list has 2 medication(s) that are the same as other medications prescribed for you. Read the directions carefully, and ask your doctor or other care provider to review them with you.         Where to Get Your Medications      Information about where to get these medications is not yet available     ! Ask your nurse or doctor about these medications    - oxycodone-acetaminophen 7.5-325 MG per tablet            Instructions           Diet / Nutrition:    Follow any diet instructions given to you by your doctor or the dietician, including how much salt (sodium) you are allowed each day.    If you are overweight, talk to your doctor about a weight reduction plan.    Activity:    Remain physically active following your doctor's instructions about exercise and activity.    Rest often.     Any time you become even a little tired or short of breath, SIT DOWN and rest.    Worsening Symptoms:    Report any of the following signs and symptoms to the doctor's office immediately:    *Pain of jaw, arm, or neck  *Chest pain not relieved by medication                               *Dizziness or loss of consciousness  *Difficulty breathing even when at rest   *More tired than usual                                       *Bleeding drainage or swelling of surgical site  *Swelling of feet, ankles, legs or stomach                 *Fever (>100ºF)  *Pink or blood tinged sputum  *Weight gain (3lbs/day or 5lbs /week)           *Shock from internal  defibrillator (if applicable)  *Palpitations or irregular heartbeats                *Cool and/or numb extremities    Stroke Awareness    Common Risk Factors for Stroke include:    Age  Atrial Fibrillation  Carotid Artery Stenosis  Diabetes Mellitus  Excessive alcohol consumption  High blood pressure  Overweight   Physical inactivity  Smoking    Warning signs and symptoms of a stroke include:    *Sudden numbness or weakness of the face, arm or leg (especially on one side of the body).  *Sudden confusion, trouble speaking or understanding.  *Sudden trouble seeing in one or both eyes.  *Sudden trouble walking, dizziness, loss of balance or coordination.Sudden severe headache with no known cause.    It is very important to get treatment quickly when a stroke occurs. If you experience any of the above warning signs, call 911 immediately.                   Disclaimer         Quit Smoking / Tobacco Use:    I understand the use of any tobacco products increases my chance of suffering from future heart disease or stroke and could cause other illnesses which may shorten my life. Quitting the use of tobacco products is the single most important thing I can do to improve my health. For further information on smoking / tobacco cessation call a Toll Free Quit Line at 1-610.324.7000 (*National Cancer [place]) or 1-973.442.9431 (American Lung Association) or you can access the web based program at www.lungusa.org.    [place] Tobacco Users Help Line:  (498) 550-3844       Toll Free: 1-343.700.8813  Quit Tobacco Program [facility] (685)238-0305    Crisis Hotline:    [place] Crisis Hotline:  6-297-ZQDBNUW or 1-315.458.2163    [place] Crisis Hotline:    1-402.229.7733 or 450-517-1409    Discharge Survey:   Thank you for choosing [facility]. We hope we did everything we could to make your hospital stay a pleasant one. You may be receiving a phone survey and we would appreciate your time and participation in  answering the questions. Your input is very valuable to us in our efforts to improve our service to our patients and their families.        My signature on this form indicates that:    1. I have reviewed and understand the above information.  2. My questions regarding this information have been answered to my satisfaction.  3. I have formulated a plan with my discharge nurse to obtain my prescribed medications for home.                  Disclaimer         __________________________________                     __________       ________                       Patient Signature                                                 Date                    Time

## 2017-04-11 NOTE — ED NOTES
Pt diagnosed with an aortic aneurism at a hospital in [place] and sent for medical care home in the [place]. Pt states he had pain in his low back and abdomen, hx of heart surgery.

## 2017-04-11 NOTE — ED PROVIDER NOTES
ED Provider Note    Scribed for Ezekiel Garza M.D. by Shan Perez. 4/11/2017, 10:51 AM.    Primary care provider: No primary care provider on file.  Means of arrival: walk in  History obtained from: patient and friend  History limited by: none    CHIEF COMPLAINT  Chief Complaint   Patient presents with   • Abdominal Pain   • Back Pain   • Near Syncopal   • Hypertension       HPI  Danny Antony is a 67 y.o. male who presents to the Emergency Department with constant  left quadrant abdominal pain starting 7 days ago. He reports associated left gluteal pain. Patient's friend reports that he was in [place] when he suddenly started to have abdominal pain. The patient was evaluted in a hospital near [facility] and diagnosed with 4-5 cm aortic aneurism. He was informed that the available procedure in [place] has significant risk and he was told to come back to the [place] to seek medical attention. The patient flew in yesterday and presents to the ED today. He has a history of hypertension, MI and coronary bypass 10 years ago. Patient denies chest pain, headache, history of diabetes, history of kidney problems, alcohol use, drug use, recent cigarette use.     REVIEW OF SYSTEMS  Review of Systems   Cardiovascular: Negative for chest pain.   Gastrointestinal: Positive for abdominal pain.   Musculoskeletal:        Positive gluteal pain   Neurological: Negative for headaches.   All other systems reviewed and are negative.  C.    PAST MEDICAL HISTORY   has a past medical history of MI (myocardial infarction) (CMS-HCC) (2013).Myocardial infarction  Coronary bypass    SURGICAL HISTORY   has past surgical history that includes other cardiac surgery (2013).    SOCIAL HISTORY  Social History   Substance Use Topics   • Smoking status: No   • Smokeless tobacco: No   • Alcohol Use: No      History   Drug Use No       FAMILY HISTORY  None noted    CURRENT MEDICATIONS    Current facility-administered medications:   •  esmolol  "(BREVIBLOC) 2.5 g/250 mL NS infusion (PREMIX), 50 mcg/kg/min, Intravenous, Continuous, Ezekiel Garza M.D., Last Rate: 23 mL/hr at 04/11/17 1120, 50 mcg/kg/min at 04/11/17 1120    ALLERGIES  No medicinal allergies    PHYSICAL EXAM  VITAL SIGNS: /84 mmHg  Pulse 63  Temp(Src) 37 °C (98.6 °F)  Resp 15  Ht 1.753 m (5' 9.02\")  Wt 78 kg (171 lb 15.3 oz)  BMI 25.38 kg/m2  SpO2 98%    Constitutional:   No acute distress  HENT:  Moist mucous membranes  Eyes:  No conjunctivitis or icterus  Neck:  trachea is midline, no palpable thyroid  Lymphatic:  No cervical lymphadenopathy  Cardiovascular:  Regular rate and rhythm, no murmurs  Thorax & Lungs:  Normal breath sounds, no rhonchi  Abdomen: Soft. Pulsatile mass that is non tender.   Skin:.  no rash  Back:  Non-tender, no CVA tenderness  Extremities:   no edema  Vascular:  Diminished pulse in left pedis dorsalis.   Neurologic:  Normal gross motor    LABS  Labs Reviewed   CBC WITH DIFFERENTIAL - Abnormal; Notable for the following:     MCHC 33.6 (*)     Platelet Count 157 (*)     All other components within normal limits    Narrative:     Indicate which anticoagulants the patient is on:->UNKNOWN   COMP METABOLIC PANEL - Abnormal; Notable for the following:     Potassium 3.5 (*)     Globulin 3.6 (*)     All other components within normal limits    Narrative:     Indicate which anticoagulants the patient is on:->UNKNOWN   APTT    Narrative:     Indicate which anticoagulants the patient is on:->UNKNOWN   COD (ADULT)   PROTHROMBIN TIME    Narrative:     Indicate which anticoagulants the patient is on:->UNKNOWN   ESTIMATED GFR    Narrative:     Indicate which anticoagulants the patient is on:->UNKNOWN   ABO AND RH DETERMINATION    Narrative:     Down in lab   ABO CONFIRMATION    Narrative:     Down in lab   TRANSFUSE RBC ACTIVE BLEED-NURSING COMMUNICATION   All labs reviewed by me.    EKG Interpretation  Interpreted by me  First degree AV block  Nonspecific IVCD with " LAD  Left ventricular hypertrophy with secondary repolarization abnormality.     RADIOLOGY  DX-CHEST-LIMITED (1 VIEW)   Final Result         No pulmonary infiltrates or consolidations are noted.      CT-CTA COMPLETE THORACOABDOMINAL AORTA   Final Result      1.  5.1 cm bilobed mostly fusiform, mostly infrarenal abdominal aortic aneurysm with possible proximal right saccular component at the level of the left renal artery.      2.  No aortic dissection or mediastinal hematoma      3.  Extensive atherosclerotic soft plaque and a possible penetrating atherosclerotic ulcer in the proximal right common iliac artery without evidence of acute arteritis and/or extravasation      4.  Horseshoe kidney      5.  Multichamber cardiac enlargement following coronary artery by-pass graft procedure      6.  Increased number of normal-sized mediastinal lymph nodes could be reactive. Malignant origin as can be seen with CLL is not excluded.      7.  5 mm right upper lobe noncalcified nodule. Follow-up CT is advised in 6-12 months, then at 18-24 months and if unchanged, additional follow-up is not required (MacMahoina et al, Radiology 2005).      8.  Remote granulomatous disease      CT-FOREIGN FILM CAT SCAN   Preliminary Result      The radiologist's interpretation of all radiological studies have been reviewed by me.    COURSE & MEDICAL DECISION MAKING  Pertinent Labs & Imaging studies reviewed. (See chart for details)    10:51 AM - Patient seen and examined at bedside. Patient will be treated with Brevibloc, NS 1000 ml for CT contrast. Ordered CT CTA complete thoracicoabdominal aorta, DX chest, CBC with differential, CMP, APTT, COD, PT/INR to evaluate his symptoms. The differential diagnoses include but are not limited to: aortic aneurism, rule out aortic dissection. Patient will be upgraded to code aorta.     11:04 AM - Paged vascular surgeon.    11:15 AM - Pulses rechecked. See exam above.     11:19 AM - I discussed the patient's  case and the above findings with Dr. Kerr (vascular surgeon) who will consult with the patient. Patient will be placed on an esmolol drip.     12:27 PM - Patient reevaluated at bedside. Patient reports that he is resting comfortably at this time. Discussed his results. Dr. Kerr is the ED at this time and is consulting with the patient.    CRITICAL CARE  The very real possibilty of a deterioration of this patient's condition required the highest level of my preparedness for sudden, emergent intervention.  I provided critical care services, which included medication orders, frequent reevaluations of the patient's condition and response to treatment, ordering and reviewing test results, and discussing the case with various consultants.  The critical care time associated with the care of the patient was 35 minutes. Review chart for interventions. This time is exclusive of any other billable procedures.       Medical Decision Making:  Patient has a 5.1 cm saccular asymmetric abdominal aortic aneurysm with pain. There is no sign of rupture. I contacted vascular surgery patient's being admitted. He was given esmolol for blood pressure control.    DISPOSITION:  Patient will be admitted to hospitalist in guarded condition.      FINAL IMPRESSION  1. Abdominal aortic aneurysm (AAA) without rupture (CMS-[facility])     critical care 35 minutes     Shan SIN (Scribe), am scribing for, and in the presence of, Ezekiel Garza M.D..    Electronically signed by: Shan Perez (Anastaciaibe), 4/11/2017    Ezekiel SIN M.D. personally performed the services described in this documentation, as scribed by Shan Perez in my presence, and it is both accurate and complete.    The note accurately reflects work and decisions made by me.  Ezekiel Garza  4/11/2017  5:11 PM

## 2017-04-12 LAB
ALBUMIN SERPL BCP-MCNC: 2.7 G/DL (ref 3.2–4.9)
ALBUMIN/GLOB SERPL: 1.2 G/DL
ALP SERPL-CCNC: 60 U/L (ref 30–99)
ALT SERPL-CCNC: 17 U/L (ref 2–50)
ANION GAP SERPL CALC-SCNC: 12 MMOL/L (ref 0–11.9)
ANION GAP SERPL CALC-SCNC: 9 MMOL/L (ref 0–11.9)
AST SERPL-CCNC: 26 U/L (ref 12–45)
BASE EXCESS BLDA CALC-SCNC: -3 MMOL/L (ref -4–3)
BASOPHILS # BLD AUTO: 0 % (ref 0–1.8)
BASOPHILS # BLD: 0 K/UL (ref 0–0.12)
BILIRUB SERPL-MCNC: 0.6 MG/DL (ref 0.1–1.5)
BODY TEMPERATURE: ABNORMAL DEGREES
BUN SERPL-MCNC: 22 MG/DL (ref 8–22)
BUN SERPL-MCNC: 22 MG/DL (ref 8–22)
CA-I BLD ISE-SCNC: 0.98 MMOL/L (ref 1.1–1.3)
CALCIUM SERPL-MCNC: 6.9 MG/DL (ref 8.5–10.5)
CALCIUM SERPL-MCNC: 7.1 MG/DL (ref 8.5–10.5)
CHLORIDE SERPL-SCNC: 107 MMOL/L (ref 96–112)
CHLORIDE SERPL-SCNC: 109 MMOL/L (ref 96–112)
CO2 BLDA-SCNC: 24 MMOL/L (ref 20–33)
CO2 SERPL-SCNC: 18 MMOL/L (ref 20–33)
CO2 SERPL-SCNC: 21 MMOL/L (ref 20–33)
CREAT SERPL-MCNC: 1.93 MG/DL (ref 0.5–1.4)
CREAT SERPL-MCNC: 2 MG/DL (ref 0.5–1.4)
EOSINOPHIL # BLD AUTO: 0 K/UL (ref 0–0.51)
EOSINOPHIL NFR BLD: 0 % (ref 0–6.9)
ERYTHROCYTE [DISTWIDTH] IN BLOOD BY AUTOMATED COUNT: 44.9 FL (ref 35.9–50)
GFR SERPL CREATININE-BSD FRML MDRD: 34 ML/MIN/1.73 M 2
GFR SERPL CREATININE-BSD FRML MDRD: 35 ML/MIN/1.73 M 2
GLOBULIN SER CALC-MCNC: 2.2 G/DL (ref 1.9–3.5)
GLUCOSE BLD-MCNC: 174 MG/DL (ref 65–99)
GLUCOSE SERPL-MCNC: 175 MG/DL (ref 65–99)
GLUCOSE SERPL-MCNC: 186 MG/DL (ref 65–99)
HCO3 BLDA-SCNC: 22.7 MMOL/L (ref 17–25)
HCT VFR BLD AUTO: 41.5 % (ref 42–52)
HCT VFR BLD CALC: 40 % (ref 42–52)
HGB BLD-MCNC: 13.1 G/DL (ref 14–18)
HGB BLD-MCNC: 13.6 G/DL (ref 14–18)
LYMPHOCYTES # BLD AUTO: 0.28 K/UL (ref 1–4.8)
LYMPHOCYTES NFR BLD: 1.8 % (ref 22–41)
MANUAL DIFF BLD: ABNORMAL
MCH RBC QN AUTO: 27.5 PG (ref 27–33)
MCHC RBC AUTO-ENTMCNC: 31.6 G/DL (ref 33.7–35.3)
MCV RBC AUTO: 87.2 FL (ref 81.4–97.8)
MONOCYTES # BLD AUTO: 0.54 K/UL (ref 0–0.85)
MONOCYTES NFR BLD AUTO: 3.5 % (ref 0–13.4)
MORPHOLOGY BLD-IMP: NORMAL
NEUTROPHILS # BLD AUTO: 14.68 K/UL (ref 1.82–7.42)
NEUTROPHILS NFR BLD: 77.9 % (ref 44–72)
NEUTS BAND NFR BLD MANUAL: 16.8 % (ref 0–10)
NRBC # BLD AUTO: 0 K/UL
NRBC BLD AUTO-RTO: 0 /100 WBC
O2/TOTAL GAS SETTING VFR VENT: 100 %
OVALOCYTES BLD QL SMEAR: NORMAL
PCO2 BLDA: 40 MMHG (ref 26–37)
PCO2 TEMP ADJ BLDA: 39.4 MMHG (ref 26–37)
PH BLDA: 7.36 [PH] (ref 7.4–7.5)
PH TEMP ADJ BLDA: 7.37 [PH] (ref 7.4–7.5)
PLATELET # BLD AUTO: 118 K/UL (ref 164–446)
PLATELET BLD QL SMEAR: NORMAL
PMV BLD AUTO: 12.3 FL (ref 9–12.9)
PO2 BLDA: 231 MMHG (ref 64–87)
PO2 TEMP ADJ BLDA: 229 MMHG (ref 64–87)
POIKILOCYTOSIS BLD QL SMEAR: NORMAL
POTASSIUM BLD-SCNC: 3.5 MMOL/L (ref 3.6–5.5)
POTASSIUM SERPL-SCNC: 4.4 MMOL/L (ref 3.6–5.5)
POTASSIUM SERPL-SCNC: 4.4 MMOL/L (ref 3.6–5.5)
PROT SERPL-MCNC: 4.9 G/DL (ref 6–8.2)
RBC # BLD AUTO: 4.76 M/UL (ref 4.7–6.1)
RBC BLD AUTO: PRESENT
SAO2 % BLDA: 100 % (ref 93–99)
SODIUM BLD-SCNC: 139 MMOL/L (ref 135–145)
SODIUM SERPL-SCNC: 137 MMOL/L (ref 135–145)
SODIUM SERPL-SCNC: 139 MMOL/L (ref 135–145)
SPECIMEN DRAWN FROM PATIENT: ABNORMAL
WBC # BLD AUTO: 15.5 K/UL (ref 4.8–10.8)

## 2017-04-12 PROCEDURE — 700102 HCHG RX REV CODE 250 W/ 637 OVERRIDE(OP): Performed by: ANESTHESIOLOGY

## 2017-04-12 PROCEDURE — 700111 HCHG RX REV CODE 636 W/ 250 OVERRIDE (IP): Performed by: SURGERY

## 2017-04-12 PROCEDURE — 770022 HCHG ROOM/CARE - ICU (200)

## 2017-04-12 PROCEDURE — 85007 BL SMEAR W/DIFF WBC COUNT: CPT

## 2017-04-12 PROCEDURE — 700111 HCHG RX REV CODE 636 W/ 250 OVERRIDE (IP): Performed by: ANESTHESIOLOGY

## 2017-04-12 PROCEDURE — 80048 BASIC METABOLIC PNL TOTAL CA: CPT

## 2017-04-12 PROCEDURE — 94668 MNPJ CHEST WALL SBSQ: CPT

## 2017-04-12 PROCEDURE — 80053 COMPREHEN METABOLIC PANEL: CPT

## 2017-04-12 PROCEDURE — 700101 HCHG RX REV CODE 250: Performed by: SURGERY

## 2017-04-12 PROCEDURE — A9270 NON-COVERED ITEM OR SERVICE: HCPCS | Performed by: ANESTHESIOLOGY

## 2017-04-12 PROCEDURE — 700102 HCHG RX REV CODE 250 W/ 637 OVERRIDE(OP): Performed by: SURGERY

## 2017-04-12 PROCEDURE — A9270 NON-COVERED ITEM OR SERVICE: HCPCS | Performed by: SURGERY

## 2017-04-12 PROCEDURE — 85027 COMPLETE CBC AUTOMATED: CPT

## 2017-04-12 PROCEDURE — 94667 MNPJ CHEST WALL 1ST: CPT

## 2017-04-12 RX ORDER — SODIUM CHLORIDE, SODIUM LACTATE, POTASSIUM CHLORIDE, CALCIUM CHLORIDE 600; 310; 30; 20 MG/100ML; MG/100ML; MG/100ML; MG/100ML
500 INJECTION, SOLUTION INTRAVENOUS CONTINUOUS
Status: DISCONTINUED | OUTPATIENT
Start: 2017-04-12 | End: 2017-04-13

## 2017-04-12 RX ORDER — METOCLOPRAMIDE 10 MG/1
10 TABLET ORAL 4 TIMES DAILY
COMMUNITY
End: 2017-05-09

## 2017-04-12 RX ORDER — SODIUM CHLORIDE, SODIUM LACTATE, POTASSIUM CHLORIDE, CALCIUM CHLORIDE 600; 310; 30; 20 MG/100ML; MG/100ML; MG/100ML; MG/100ML
500 INJECTION, SOLUTION INTRAVENOUS PRN
Status: DISCONTINUED | OUTPATIENT
Start: 2017-04-12 | End: 2017-04-14

## 2017-04-12 RX ADMIN — SODIUM CHLORIDE, SODIUM LACTATE, POTASSIUM CHLORIDE, CALCIUM CHLORIDE 500 ML: 600; 310; 30; 20 INJECTION, SOLUTION INTRAVENOUS at 00:20

## 2017-04-12 RX ADMIN — CEFAZOLIN SODIUM 1000 MG: 1 INJECTION, SOLUTION INTRAVENOUS at 01:47

## 2017-04-12 RX ADMIN — SODIUM CHLORIDE, POTASSIUM CHLORIDE, SODIUM LACTATE AND CALCIUM CHLORIDE 500 ML: 600; 310; 30; 20 INJECTION, SOLUTION INTRAVENOUS at 10:57

## 2017-04-12 RX ADMIN — FAMOTIDINE 20 MG: 10 INJECTION, SOLUTION INTRAVENOUS at 20:27

## 2017-04-12 RX ADMIN — ONDANSETRON 4 MG: 2 INJECTION INTRAMUSCULAR; INTRAVENOUS at 05:20

## 2017-04-12 RX ADMIN — DEXAMETHASONE SODIUM PHOSPHATE 4 MG: 4 INJECTION, SOLUTION INTRAMUSCULAR; INTRAVENOUS at 12:25

## 2017-04-12 RX ADMIN — LABETALOL HYDROCHLORIDE 10 MG: 5 INJECTION, SOLUTION INTRAVENOUS at 03:30

## 2017-04-12 RX ADMIN — FAMOTIDINE 20 MG: 20 TABLET, FILM COATED ORAL at 01:05

## 2017-04-12 RX ADMIN — ACETAMINOPHEN 975 MG: 650 SUPPOSITORY RECTAL at 18:33

## 2017-04-12 RX ADMIN — ACETAMINOPHEN 975 MG: 650 SUPPOSITORY RECTAL at 12:46

## 2017-04-12 RX ADMIN — CALCIUM GLUCONATE 3 G: 94 INJECTION, SOLUTION INTRAVENOUS at 08:16

## 2017-04-12 RX ADMIN — HYDRALAZINE HYDROCHLORIDE 10 MG: 20 INJECTION INTRAMUSCULAR; INTRAVENOUS at 18:33

## 2017-04-12 RX ADMIN — DIPHENHYDRAMINE HYDROCHLORIDE 12.5 MG: 50 INJECTION, SOLUTION INTRAMUSCULAR; INTRAVENOUS at 20:30

## 2017-04-12 RX ADMIN — LABETALOL HYDROCHLORIDE 10 MG: 5 INJECTION, SOLUTION INTRAVENOUS at 12:18

## 2017-04-12 RX ADMIN — SODIUM CHLORIDE, SODIUM LACTATE, POTASSIUM CHLORIDE, CALCIUM CHLORIDE AND DEXTROSE MONOHYDRATE: 5; 600; 310; 30; 20 INJECTION, SOLUTION INTRAVENOUS at 01:06

## 2017-04-12 RX ADMIN — ACETAMINOPHEN 1000 MG: 500 TABLET, FILM COATED ORAL at 01:05

## 2017-04-12 RX ADMIN — CEFAZOLIN SODIUM 1000 MG: 1 INJECTION, SOLUTION INTRAVENOUS at 18:35

## 2017-04-12 RX ADMIN — SODIUM CHLORIDE, SODIUM LACTATE, POTASSIUM CHLORIDE, CALCIUM CHLORIDE AND DEXTROSE MONOHYDRATE: 5; 600; 310; 30; 20 INJECTION, SOLUTION INTRAVENOUS at 12:22

## 2017-04-12 RX ADMIN — SODIUM CHLORIDE, SODIUM LACTATE, POTASSIUM CHLORIDE, CALCIUM CHLORIDE 500 ML: 600; 310; 30; 20 INJECTION, SOLUTION INTRAVENOUS at 00:00

## 2017-04-12 RX ADMIN — SODIUM CHLORIDE, SODIUM LACTATE, POTASSIUM CHLORIDE, CALCIUM CHLORIDE AND DEXTROSE MONOHYDRATE: 5; 600; 310; 30; 20 INJECTION, SOLUTION INTRAVENOUS at 19:09

## 2017-04-12 RX ADMIN — FAMOTIDINE 20 MG: 10 INJECTION, SOLUTION INTRAVENOUS at 08:16

## 2017-04-12 RX ADMIN — CEFAZOLIN SODIUM 1000 MG: 1 INJECTION, SOLUTION INTRAVENOUS at 10:28

## 2017-04-12 RX ADMIN — ONDANSETRON 4 MG: 2 INJECTION INTRAMUSCULAR; INTRAVENOUS at 08:51

## 2017-04-12 RX ADMIN — HYDRALAZINE HYDROCHLORIDE 10 MG: 20 INJECTION INTRAMUSCULAR; INTRAVENOUS at 12:41

## 2017-04-12 ASSESSMENT — ENCOUNTER SYMPTOMS
ABDOMINAL PAIN: 0
NAUSEA: 1
DIZZINESS: 1
VOMITING: 0
FOCAL WEAKNESS: 0
BACK PAIN: 0
FEVER: 0
COUGH: 0
SHORTNESS OF BREATH: 0

## 2017-04-12 ASSESSMENT — PAIN SCALES - GENERAL
PAINLEVEL_OUTOF10: 0
PAINLEVEL_OUTOF10: 0
PAINLEVEL_OUTOF10: 7
PAINLEVEL_OUTOF10: 0
PAINLEVEL_OUTOF10: 2
PAINLEVEL_OUTOF10: 0
PAINLEVEL_OUTOF10: 5
PAINLEVEL_OUTOF10: 0

## 2017-04-12 ASSESSMENT — LIFESTYLE VARIABLES
EVER_SMOKED: NEVER
EVER_SMOKED: YES
ALCOHOL_USE: NO

## 2017-04-12 ASSESSMENT — COPD QUESTIONNAIRES
HAVE YOU SMOKED AT LEAST 100 CIGARETTES IN YOUR ENTIRE LIFE: NO/DON'T KNOW
DURING THE PAST 4 WEEKS HOW MUCH DID YOU FEEL SHORT OF BREATH: NONE/LITTLE OF THE TIME
COPD SCREENING SCORE: 2
DO YOU EVER COUGH UP ANY MUCUS OR PHLEGM?: NO/ONLY WITH OCCASIONAL COLDS OR INFECTIONS

## 2017-04-12 NOTE — OR NURSING
Received report from LITZY June.  Discussed plan of care.  Anesthesiologist at bedside to replace epidural.  Patient assisted to a sitting position with legs dangling at bedside.  Versed 2mg admin by physician.  Patient clyde procedure.  Post epidural placement hypotension noted.  Fluid bolus admin per Dr. Koch

## 2017-04-12 NOTE — PROGRESS NOTES
Report received from LITZY Vazquez. Patient arrived in S109 on monitor at 0035. Patient fully assessed at this time, A/Ox4 no complaints of pain. Family at bedside.

## 2017-04-12 NOTE — PROGRESS NOTES
Dr. Kerr notified regarding critical labs, updated regarding patient status throughout night and AM lab results. Orders received to administer 2g calcium chloride IVPB. Entered into epic and carried out.

## 2017-04-12 NOTE — H&P
REASON FOR ADMISSION:  Symptomatic juxtarenal abdominal aortic aneurysm.    HISTORY OF PRESENT ILLNESS:  The patient is a 67-year-old gentleman who   presents from recent travel to [place].  He was seen in [place] where he was   told that he had an aneurysm.  He was sent with notes and a CD ROM of his   abdomen.  The patient reports pain in his low back and abdomen since mid to   late March.  He thought he had back problems, but was told in [place] that his   risk of surgery for this aneurysm was at least 50%.  He flew back to the   [place] for treatment.    PAST MEDICAL HISTORY:  1.  Coronary artery disease.  2.  Hypertension.  3.  Presumed hyperlipidemia.    MEDICATIONS:  None known.    ALLERGIES:  None known.    PAST SURGICAL HISTORY:  Cardiac bypass.    SOCIAL HISTORY:  He denies current tobacco use, but has had somewhat heavy   abuse in the past.  He denies any alcohol abuse.  His son and daughter-in-law   are at the bedside and his son acts as .  The wife is also at the   bedside and participates in the communication.    FAMILY HISTORY:  Unremarkable.    PHYSICAL EXAMINATION:  GENERAL:  This is a healthy appearing, middle-aged  male, in no   apparent distress.  VITAL SIGNS:  Blood pressure is 159/90, temperature is 36.6, pulse is 69, and   respirations 18.  HEENT:  Pupils are equal, round, and reactive to light.  Extraocular muscles   are intact.  NECK:  Supple.  I appreciate no carotid bruits.  HEART:  Regular rate and rhythm.  There are no murmurs or rubs.  LUNGS:  Clear to auscultation without rales or rhonchi.  ABDOMEN:  Soft and the aneurysm is palpable and mildly tender.  EXTREMITIES:  Left femoral pulses somewhat diminished in comparison to the   right, but the exam is difficult as the patient states it is ticklish.  The   right femoral pulse is normal.  Right dorsal pedal and posterior tibial pulses   are normal.  Left pedal pulses are diminished.    DIAGNOSTIC DATA:  CT angiogram  of the abdomen, pelvis, and chest demonstrates   juxtarenal abdominal aortic aneurysm measuring 5.2x5.1 cm.  He also has a   horseshoe kidney with accessory renal artery supplying the lower poles of that   horseshoe kidney.  The superior mesenteric artery comes off at the level of   the right renal artery and the left renal artery comes off lower with a small   area appropriate for clamp placement.  The bilateral iliac arteries are   generous measuring up to 1.7 cm on the right and 1.5 mm on the left.    IMPRESSION:  This 67-year-old gentleman has a symptomatic, infrarenal   abdominal aortic aneurysm.  His horseshoe kidney, certainly complicates repair   and given his symptomatic nature.  A small infrarenal aortic neck in the   closed quarters of his superior mesenteric artery, an endograft is not a good   option.  I showed the family the images of the CT angiogram and reviewed the   difficult nature of this open repair with them.  They appeared to understand   and wished to proceed.    PLAN:  Bifurcated graft repair of symptomatic infrarenal abdominal aortic   aneurysm.       ____________________________________     MD BETO Hernández / SAMUEL    DD:  04/11/2017 14:53:14  DT:  04/11/2017 19:01:09    D#:  [id number]  Job#:  [id number]

## 2017-04-12 NOTE — PROGRESS NOTES
SUSAN from Lab called with critical result of Calcium 6.9 at 0515. Critical lab result read back to SUSAN.   Dr. Kerr notified of critical lab result at 0540.  Critical lab result read back by Dr. Kerr.

## 2017-04-12 NOTE — CARE PLAN
Problem: Urinary Elimination:  Goal: Ability to reestablish a normal urinary elimination pattern will improve  Intervention: Evaluate need to continue indwelling urinary catheter  Catheter in place r/t presence of epidural.       Problem: Pain Management  Goal: Pain level will decrease to patient’s comfort goal  Intervention: Follow pain managment plan developed in collaboration with patient and Interdisciplinary Team  Dilaudid w/Ropivicaine in use via epidural.  Currently infusing at 4ml/hr.  Patient denies pain.

## 2017-04-12 NOTE — OP REPORT
DATE OF SERVICE:  04/11/2017    PREPROCEDURE DIAGNOSIS:  Symptomatic, infrarenal abdominal aortic aneurysm   with horseshoe kidney.    POST-OPERATIVE DIAGNOSIS: Same    PROCEDURE: Repair of symptomatic, infrarenal abdominal aortic aneurysm  Re-implantation of accessory renal arteries with Carrel patch.    SURGEON:  Charissa Kerr MD    ASSISTANT: Jersey Ruiz MD    ANESTHESIOLOGIST:  Dr. Koch.    ANESTHESIA:  General with epidural.    ASSISTANT:  Jersey Ruiz MD.    INDICATIONS:  The patient is a 67-year-old gentleman who traveled from [place]   after diagnosis of an aneurysm.  He lives in [place], but was visiting family and   has had persistent abdominal pain.  CT angiogram performed today in the   emergency room shows atheroma, which extends up to the level of the renal   arteries and a complex aneurysm with substantial thrombus along the wall.  The   patient also has a horseshoe kidney with accessory renal arteries just above   the isthmus of the horseshoe kidney.  The iliac arteries are generous in size,   but do not require direct repair.  The aortic neck is large and there appears   to be some inflammation around the aortic neck.  I have explained all this in   great detail to the patient and the family.  The son acts as  and I   reviewed the images of the aorta with the patient and his family prior to   repair.  Risks and benefits were extensive and include renal failure,   infection, bleeding, anastomotic complications, lower extremity emboli and   ischemia.  In addition, the artery of Adamkiewicz was just above the area of   the cross clamp and therefore the risk of paraplegia was also explained.  He   understands these risks and agrees to proceed.    DESCRIPTION OF PROCEDURE:  Patient was taken the operating room and placed in   supine position.  After adequate general anesthesia, the abdomen and upper   thighs were prepped in usual sterile fashion with Chloraseptic prep, cloth   towels  and paper drapes.  A time-out was performed with the attention all team   members.  The proposed procedure was reviewed.    Ioban was used to affix the drapes to the abdominal wall.  The midline   incision was taken from the xiphoid to the pubic symphysis and abdominal   exploration was limited.  The stomach, small bowel, large bowel and solid   organs appeared normal on cursory investigation.  The ligament of Treitz was   taken down and the small bowel retracted laterally.  The aortic neck was   identified, barely long enough to accept an aortic crossclamp.  With some   difficulty, I dissected down along the right lateral wall of the aorta   carefully taking the retroperitoneum off of the aorta.  I came down to the   isthmus of the horseshoe kidney and dissected around the superior aspect of   the isthmus identifying the accessory renal arteries and controlling them with   vessel loops.  The inferior mesenteric artery was identified and controlled   as well.  The CT scan gave the impression that the inferior mesenteric artery   was occluded with clot at its orifice.  We then dissected down along the   retroperitoneum carefully avoiding the visualized ureters and retracting them   laterally.  The distal common iliac arteries were identified and encircled   with vessel loops.    Patient was given 6000 units of heparin.  This was allowed to circulate.    Cross clamp was applied on the proximal aorta and hypogastric clamps placed on   the iliac arteries.  The accessory renal arteries were controlled with vessel   loops.  The aorta was opened longitudinally with cautery and dissection taken   up to the proximal aorta.  Atheromatous and chronic thrombus was removed from   right lateral wall of the aorta, identifying the diseased and very thin   aortic wall.  Proximal anastomosis was created with pledgeted sutures using   3-0 Prolene.  Multiple pledgets were used on the posterior wall to assure   hemostasis.  Just prior  to closing the anastomosis, I tightened the   anastomosis, but still required a couple interrupted 3-0 Prolene sutures for   hemostasis.  I placed a cross clamp across the graft just below the proximal   anastomosis.  The renal arteries were identified and a Carrel patch created   around the orifices of the accessory renal arteries.  I created a defect in   the main body of the aortic graft and after tunneling the limbs of the graft   down to the common iliac arteries and pulling them out straight for   appropriate length.  I repaired the accessory renal arteries with 5-0 Prolene   suture, suturing both orifices of the renal artery to the defect, I created in   the main body of the graft.  Forward flow was identified through both   accessory renal arteries.    Dr. Ruiz then performed repair of the right common iliac artery in an   end-to-end fashion using 4-0 Prolene suture.  The artery was back bled and   flow allowed through the iliac arteries.  On the left, a similar repair was   performed in an end-to-end fashion again using 4-0 Prolene suture.  The artery   was backbled and flow through the graft obtained with good perfusion into the   external iliac arteries and diminished, but palpable femoral pulses.    Great attention was paid to the retroperitoneum and multiple areas of bleeding   repaired with cautery or interrupted 3-0 Prolene suture.  We then closed the   retroperitoneum, carefully avoiding the ureters and also quite carefully   covering all areas of exposed graft, attempting where possible to close the   aorta over the aortic graft.  Interrupted 3-0 Prolene suture was required to   close the proximal aortic graft over the repair.  The abdomen was irrigated   with copious amounts of saline, and using #1 Prolene, the abdominal wall was   closed in a running fashion.  The subcutaneous tissue was irrigated with   saline and the skin approximated with skin clips.  Estimated blood loss was   approximately  900 mL.  Sponge, needle and instrument counts were correct x2.    Patient was taken to recovery room, it was in stable condition at the   completion of the procedure.       ____________________________________     MD BETO Hernández / SAMUEL    DD:  04/11/2017 20:41:45  DT:  04/11/2017 21:22:00    D#:  [id number]  Job#:  [id number]

## 2017-04-12 NOTE — FLOWSHEET NOTE
04/12/17 1434   Interdisciplinary Plan of Care-Goals (Indications)   Hyperinflation Protocol Indications Pre or Post-op Abdominal, Thoracic or Orthopedic Surgery   Interdisciplinary Plan of Care-Outcomes    Hyperinflation Protocol Goals/Outcome Greater Than 60% of Predicted I.S. Volume x 24 hrs   Education   Education Yes - Pt. / Family has been Instructed in use of Respiratory Equipment   Incentive Spirometry Group   Incentive Spirometry Instruction Yes   Breathing Exercises Yes   Incentive Spirometer Volume 1500 mL   Respiratory WDL   Respiratory (WDL) X   Chest Exam   Respiration 17   Pulse 79   Heart Rate (Monitored) 78   Breath Sounds   RUL Breath Sounds Clear   RML Breath Sounds Diminished   RLL Breath Sounds Diminished   NROMA Breath Sounds Clear   LLL Breath Sounds Diminished   Oximetry   Continuous Oximetry Yes   Oxygen   Pulse Oximetry 97 %   O2 (LPM) 3   O2 Daily Delivery Respiratory  Silicone Nasal Cannula

## 2017-04-12 NOTE — PROGRESS NOTES
Surgical Progress Note    Author: Charissa Kerr Date & Time created: 2017   10:16 AM     Interval Events:  POD# 1 s/p ;repair of AAA. Doing well and OUT of bed.  UOP remains a problem.  BP stable.     Review of Systems   Constitutional: Negative for fever.   Respiratory: Negative for cough and shortness of breath.    Gastrointestinal: Positive for nausea. Negative for vomiting and abdominal pain.   Genitourinary: Negative for dysuria.   Musculoskeletal: Negative for back pain.   Neurological: Positive for dizziness. Negative for focal weakness.     Hemodynamics:  Temp (24hrs), Av.9 °C (98.4 °F), Min:36.6 °C (97.8 °F), Max:37.1 °C (98.7 °F)  Temperature: 37.1 °C (98.7 °F), Monitored Temp: 37.4 °C (99.3 °F)  Pulse  Av.4  Min: 56  Max: 94Heart Rate (Monitored): 63CVP seated in the chair is 1-3  Blood Pressure : (!) 169/90 mmHg, Arterial BP: 104/47 mmHg, NIBP: 110/56 mmHg     Respiratory:    Respiration: (!) 9, Pulse Oximetry: 99 %, O2 Daily Delivery Respiratory : Silicone Nasal Cannula        RUL Breath Sounds: Clear, RML Breath Sounds: Diminished, RLL Breath Sounds: Diminished, NORMA Breath Sounds: Clear, LLL Breath Sounds: Diminished  Neuro:  GCS Total [place] Coma Score: 15     Fluids:    Intake/Output Summary (Last 24 hours) at 17 1016  Last data filed at 17 0800   Gross per 24 hour   Intake   6705 ml   Output   1710 ml   Net   4995 ml     Weight: 72.3 kg (159 lb 6.3 oz)  Current Diet Order   Procedures   • DIET NPO     Physical Exam   Constitutional: He is oriented to person, place, and time.   Cardiovascular: Normal rate, regular rhythm and intact distal pulses.    Pulmonary/Chest: Effort normal. No respiratory distress.   Abdominal: He exhibits no distension.   Dressing dry   Musculoskeletal: He exhibits no edema.   Neurological: He is alert and oriented to person, place, and time.   Skin: Skin is warm and dry.   Psychiatric: He has a normal mood and affect. His behavior is normal.      Labs:  Recent Results (from the past 24 hour(s))   EKG (ER)    Collection Time: 17 10:43 AM   Result Value Ref Range    Report       [facility]    Test Date:  2017  Pt Name:    SAMUEL VILLASENOR        Department: ER  MRN:        [id number]                      Room:        03  Gender:     M                            Technician: [id number]  :        1950                   Requested By:ER TRIAGE PROTOCOL  Order #:    [id number]                    Reading MD:    Measurements  Intervals                                Axis  Rate:       69                           P:          -6  PA:         260                          QRS:        -37  QRSD:       112                          T:          145  QT:         400  QTc:        429    Interpretive Statements  SINUS RHYTHM  FIRST DEGREE AV BLOCK  NONSPECIFIC IVCD WITH LAD  LVH WITH SECONDARY REPOLARIZATION ABNORMALITY  No previous ECG available for comparison     CBC WITH DIFFERENTIAL    Collection Time: 17 11:10 AM   Result Value Ref Range    WBC 8.5 4.8 - 10.8 K/uL    RBC 5.70 4.70 - 6.10 M/uL    Hemoglobin 16.2 14.0 - 18.0 g/dL    Hematocrit 48.2 42.0 - 52.0 %    MCV 84.6 81.4 - 97.8 fL    MCH 28.4 27.0 - 33.0 pg    MCHC 33.6 (L) 33.7 - 35.3 g/dL    RDW 41.3 35.9 - 50.0 fL    Platelet Count 157 (L) 164 - 446 K/uL    MPV 12.5 9.0 - 12.9 fL    Neutrophils-Polys 63.90 44.00 - 72.00 %    Lymphocytes 25.00 22.00 - 41.00 %    Monocytes 8.30 0.00 - 13.40 %    Eosinophils 2.10 0.00 - 6.90 %    Basophils 0.50 0.00 - 1.80 %    Immature Granulocytes 0.20 0.00 - 0.90 %    Nucleated RBC 0.00 /100 WBC    Neutrophils (Absolute) 5.44 1.82 - 7.42 K/uL    Lymphs (Absolute) 2.13 1.00 - 4.80 K/uL    Monos (Absolute) 0.71 0.00 - 0.85 K/uL    Eos (Absolute) 0.18 0.00 - 0.51 K/uL    Baso (Absolute) 0.04 0.00 - 0.12 K/uL    Immature Granulocytes (abs) 0.02 0.00 - 0.11 K/uL    NRBC (Absolute) 0.00 K/uL   COMP METABOLIC PANEL     Collection Time: 04/11/17 11:10 AM   Result Value Ref Range    Sodium 138 135 - 145 mmol/L    Potassium 3.5 (L) 3.6 - 5.5 mmol/L    Chloride 103 96 - 112 mmol/L    Co2 26 20 - 33 mmol/L    Anion Gap 9.0 0.0 - 11.9    Glucose 96 65 - 99 mg/dL    Bun 20 8 - 22 mg/dL    Creatinine 1.08 0.50 - 1.40 mg/dL    Calcium 9.0 8.5 - 10.5 mg/dL    AST(SGOT) 14 12 - 45 U/L    ALT(SGPT) 15 2 - 50 U/L    Alkaline Phosphatase 94 30 - 99 U/L    Total Bilirubin 0.6 0.1 - 1.5 mg/dL    Albumin 3.9 3.2 - 4.9 g/dL    Total Protein 7.5 6.0 - 8.2 g/dL    Globulin 3.6 (H) 1.9 - 3.5 g/dL    A-G Ratio 1.1 g/dL   APTT    Collection Time: 04/11/17 11:10 AM   Result Value Ref Range    APTT 26.4 24.7 - 36.0 sec   PROTHROMBIN TIME (INR)    Collection Time: 04/11/17 11:10 AM   Result Value Ref Range    PT 13.8 12.0 - 14.6 sec    INR 1.03 0.87 - 1.13   ESTIMATED GFR    Collection Time: 04/11/17 11:10 AM   Result Value Ref Range    GFR If African American >60 >60 mL/min/1.73 m 2    GFR If Non African American >60 >60 mL/min/1.73 m 2   ABO CONFIRMATION    Collection Time: 04/11/17 11:10 AM   Result Value Ref Range    Second ABO Typing With Cod AB    COD (ADULT)    Collection Time: 04/11/17 11:15 AM   Result Value Ref Range    ABO Grouping Only AB     Rh Grouping Only POS     Antibody Screen-Cod NEG    ISTAT ARTERIAL BLOOD GAS    Collection Time: 04/11/17  5:06 PM   Result Value Ref Range    Ph 7.363 (L) 7.400 - 7.500    Pco2 40.0 (H) 26.0 - 37.0 mmHg    Po2 231 (H) 64 - 87 mmHg    Tco2 24 20 - 33 mmol/L    S02 100 (H) 93 - 99 %    Hco3 22.7 17.0 - 25.0 mmol/L    BE -3 -4 - 3 mmol/L    Body Temp 98.0 F degrees    O2 Therapy 100 %    Ph Temp Daniel 7.367 (L) 7.400 - 7.500    Pco2 Temp Co 39.4 (H) 26.0 - 37.0 mmHg    Po2 Temp Cor 229 (H) 64 - 87 mmHg    Specimen Arterial    ISTAT GLUCOSE    Collection Time: 04/11/17  5:06 PM   Result Value Ref Range    Istat Glucose 174 (H) 65 - 99 mg/dL   ISTAT SODIUM    Collection Time: 04/11/17  5:06 PM   Result Value  Ref Range    Istat Sodium 139 135 - 145 mmol/L   ISTAT POTASSIUM    Collection Time: 04/11/17  5:06 PM   Result Value Ref Range    Istat Potassium 3.5 (L) 3.6 - 5.5 mmol/L   ISTAT IONIZED CA    Collection Time: 04/11/17  5:06 PM   Result Value Ref Range    Istat Ionized Calcium 0.98 (L) 1.10 - 1.30 mmol/L   ISTAT HEMATOCRIT AND HEMOGLOBIN    Collection Time: 04/11/17  5:06 PM   Result Value Ref Range    Istat Hematocrit 40 (L) 42 - 52 %    Istat Hemoglobin 13.6 (L) 14.0 - 18.0 g/dL   CBC WITH DIFFERENTIAL    Collection Time: 04/12/17  4:15 AM   Result Value Ref Range    WBC 15.5 (H) 4.8 - 10.8 K/uL    RBC 4.76 4.70 - 6.10 M/uL    Hemoglobin 13.1 (L) 14.0 - 18.0 g/dL    Hematocrit 41.5 (L) 42.0 - 52.0 %    MCV 87.2 81.4 - 97.8 fL    MCH 27.5 27.0 - 33.0 pg    MCHC 31.6 (L) 33.7 - 35.3 g/dL    RDW 44.9 35.9 - 50.0 fL    Platelet Count 118 (L) 164 - 446 K/uL    MPV 12.3 9.0 - 12.9 fL    Nucleated RBC 0.00 /100 WBC    NRBC (Absolute) 0.00 K/uL    Neutrophils-Polys 77.90 (H) 44.00 - 72.00 %    Lymphocytes 1.80 (L) 22.00 - 41.00 %    Monocytes 3.50 0.00 - 13.40 %    Eosinophils 0.00 0.00 - 6.90 %    Basophils 0.00 0.00 - 1.80 %    Neutrophils (Absolute) 14.68 (H) 1.82 - 7.42 K/uL    Lymphs (Absolute) 0.28 (L) 1.00 - 4.80 K/uL    Monos (Absolute) 0.54 0.00 - 0.85 K/uL    Eos (Absolute) 0.00 0.00 - 0.51 K/uL    Baso (Absolute) 0.00 0.00 - 0.12 K/uL   BASIC METABOLIC PANEL    Collection Time: 04/12/17  4:15 AM   Result Value Ref Range    Sodium 137 135 - 145 mmol/L    Potassium 4.4 3.6 - 5.5 mmol/L    Chloride 107 96 - 112 mmol/L    Co2 21 20 - 33 mmol/L    Glucose 175 (H) 65 - 99 mg/dL    Bun 22 8 - 22 mg/dL    Creatinine 2.00 (H) 0.50 - 1.40 mg/dL    Calcium 6.9 (LL) 8.5 - 10.5 mg/dL    Anion Gap 9.0 0.0 - 11.9   ESTIMATED GFR    Collection Time: 04/12/17  4:15 AM   Result Value Ref Range    GFR If  41 (A) >60 mL/min/1.73 m 2    GFR If Non  34 (A) >60 mL/min/1.73 m 2   DIFFERENTIAL MANUAL     Collection Time: 04/12/17  4:15 AM   Result Value Ref Range    Bands-Stabs 16.80 (H) 0.00 - 10.00 %    Manual Diff Status PERFORMED    PERIPHERAL SMEAR REVIEW    Collection Time: 04/12/17  4:15 AM   Result Value Ref Range    Peripheral Smear Review see below    PLATELET ESTIMATE    Collection Time: 04/12/17  4:15 AM   Result Value Ref Range    Plt Estimation Decreased    MORPHOLOGY    Collection Time: 04/12/17  4:15 AM   Result Value Ref Range    RBC Morphology Present     Poikilocytosis 1+     Ovalocytes 1+    COMP METABOLIC PANEL    Collection Time: 04/12/17  4:15 AM   Result Value Ref Range    Sodium 139 135 - 145 mmol/L    Potassium 4.4 3.6 - 5.5 mmol/L    Chloride 109 96 - 112 mmol/L    Co2 18 (L) 20 - 33 mmol/L    Anion Gap 12.0 (H) 0.0 - 11.9    Glucose 186 (H) 65 - 99 mg/dL    Bun 22 8 - 22 mg/dL    Creatinine 1.93 (H) 0.50 - 1.40 mg/dL    Calcium 7.1 (L) 8.5 - 10.5 mg/dL    AST(SGOT) 26 12 - 45 U/L    ALT(SGPT) 17 2 - 50 U/L    Alkaline Phosphatase 60 30 - 99 U/L    Total Bilirubin 0.6 0.1 - 1.5 mg/dL    Albumin 2.7 (L) 3.2 - 4.9 g/dL    Total Protein 4.9 (L) 6.0 - 8.2 g/dL    Globulin 2.2 1.9 - 3.5 g/dL    A-G Ratio 1.2 g/dL   ESTIMATED GFR    Collection Time: 04/12/17  4:15 AM   Result Value Ref Range    GFR If  42 (A) >60 mL/min/1.73 m 2    GFR If Non African American 35 (A) >60 mL/min/1.73 m 2     Medical Decision Making, by Problem:  Active Hospital Problems    Diagnosis   • Aortic aneurysm without rupture (CMS-HCC) [I71.9]     Plan:  Hydrate and continue NPO.  Will give a bolus, keep in ICU and recheck CVP after bolus. Consider transfer to the floor if UOP picks up.    Quality Measures:    Lawrence catheter: One or Two Days Post Surgery (Day of Surgery being Day 0)      DVT Prophylaxis: Contraindicated - High bleeding risk        Assessed for rehab: Patient unable to tolerate rehabilitation therapeutic regimen      Discussed patient condition with RN and Patient

## 2017-04-12 NOTE — PROGRESS NOTES
"SBP rising, patient c/o nausea \"everytime he moves.\"  Patient transferred from chair to bed.  PRN medications for hypertension and nausea administered.  Increased Dilaudid epidural to 4ml/hr.   "

## 2017-04-12 NOTE — OR SURGEON
Immediate Post-Operative Note      PreOp Diagnosis: Symptomatic infrarenal abdominal aortic aneurysm    PostOp Diagnosis: Same    Procedure(s):Aortobi-iliac bypass  re-implantation of accessory renal arteries. - Wound Class: Clean    Surgeon(s):  Charissa Kerr M.D.    Assistant  Jersey Ruiz M.D.    Anesthesiologist/Type of Anesthesia:  Anesthesiologist: Greta Koch M.D.  Anesthesia Technician: Madina Stuart/General & epidural    Surgical Staff:  Cell Saver : Lori Jimenes  Circulator: Malik Kaplan RDESIREE; Birdie Causey R.N.  Perfusionist: Rene Rosales Circulator: Nichol Price RDESIREE  Scrub Person: Yvonne Tate; Ravi Aden R.N.    Specimen: clot and aortic wall    Estimated Blood Loss: 900cc    Findings: Very thin proximal aorta with atheroma at neck, pledgeted repair    Complications: none    [id number]        4/11/2017 8:29 PM Charissa Kerr

## 2017-04-12 NOTE — CARE PLAN
Problem: Fluid Volume:  Goal: Will maintain balanced intake and output  Strict monitoring of fluid volume status, documenting I/Os in flowsheet.     Problem: Communication  Goal: The ability to communicate needs accurately and effectively will improve  Patient and family oriented to unit, updated to plan of care, educated regarding use of call light.

## 2017-04-13 LAB
ANION GAP SERPL CALC-SCNC: 5 MMOL/L (ref 0–11.9)
BASOPHILS # BLD AUTO: 0.1 % (ref 0–1.8)
BASOPHILS # BLD: 0.02 K/UL (ref 0–0.12)
BUN SERPL-MCNC: 25 MG/DL (ref 8–22)
CALCIUM SERPL-MCNC: 7.3 MG/DL (ref 8.5–10.5)
CHLORIDE SERPL-SCNC: 108 MMOL/L (ref 96–112)
CO2 SERPL-SCNC: 24 MMOL/L (ref 20–33)
CREAT SERPL-MCNC: 2.1 MG/DL (ref 0.5–1.4)
EOSINOPHIL # BLD AUTO: 0.01 K/UL (ref 0–0.51)
EOSINOPHIL NFR BLD: 0.1 % (ref 0–6.9)
ERYTHROCYTE [DISTWIDTH] IN BLOOD BY AUTOMATED COUNT: 46.5 FL (ref 35.9–50)
GFR SERPL CREATININE-BSD FRML MDRD: 32 ML/MIN/1.73 M 2
GLUCOSE SERPL-MCNC: 138 MG/DL (ref 65–99)
HCT VFR BLD AUTO: 34.4 % (ref 42–52)
HGB BLD-MCNC: 11.2 G/DL (ref 14–18)
IMM GRANULOCYTES # BLD AUTO: 0.1 K/UL (ref 0–0.11)
IMM GRANULOCYTES NFR BLD AUTO: 0.7 % (ref 0–0.9)
LYMPHOCYTES # BLD AUTO: 1.02 K/UL (ref 1–4.8)
LYMPHOCYTES NFR BLD: 6.7 % (ref 22–41)
MCH RBC QN AUTO: 28.4 PG (ref 27–33)
MCHC RBC AUTO-ENTMCNC: 32.6 G/DL (ref 33.7–35.3)
MCV RBC AUTO: 87.1 FL (ref 81.4–97.8)
MONOCYTES # BLD AUTO: 1 K/UL (ref 0–0.85)
MONOCYTES NFR BLD AUTO: 6.5 % (ref 0–13.4)
NEUTROPHILS # BLD AUTO: 13.14 K/UL (ref 1.82–7.42)
NEUTROPHILS NFR BLD: 85.9 % (ref 44–72)
NRBC # BLD AUTO: 0 K/UL
NRBC BLD AUTO-RTO: 0 /100 WBC
PLATELET # BLD AUTO: 83 K/UL (ref 164–446)
PMV BLD AUTO: 12.3 FL (ref 9–12.9)
POTASSIUM SERPL-SCNC: 3.4 MMOL/L (ref 3.6–5.5)
RBC # BLD AUTO: 3.95 M/UL (ref 4.7–6.1)
SODIUM SERPL-SCNC: 137 MMOL/L (ref 135–145)
WBC # BLD AUTO: 15.3 K/UL (ref 4.8–10.8)

## 2017-04-13 PROCEDURE — 700111 HCHG RX REV CODE 636 W/ 250 OVERRIDE (IP): Performed by: SURGERY

## 2017-04-13 PROCEDURE — 700102 HCHG RX REV CODE 250 W/ 637 OVERRIDE(OP): Performed by: ANESTHESIOLOGY

## 2017-04-13 PROCEDURE — 700111 HCHG RX REV CODE 636 W/ 250 OVERRIDE (IP): Performed by: ANESTHESIOLOGY

## 2017-04-13 PROCEDURE — 700102 HCHG RX REV CODE 250 W/ 637 OVERRIDE(OP)

## 2017-04-13 PROCEDURE — 80048 BASIC METABOLIC PNL TOTAL CA: CPT

## 2017-04-13 PROCEDURE — A9270 NON-COVERED ITEM OR SERVICE: HCPCS | Performed by: ANESTHESIOLOGY

## 2017-04-13 PROCEDURE — A9270 NON-COVERED ITEM OR SERVICE: HCPCS

## 2017-04-13 PROCEDURE — 85025 COMPLETE CBC W/AUTO DIFF WBC: CPT

## 2017-04-13 PROCEDURE — 770006 HCHG ROOM/CARE - MED/SURG/GYN SEMI*

## 2017-04-13 RX ORDER — DIPHENHYDRAMINE HYDROCHLORIDE 50 MG/ML
25 INJECTION INTRAMUSCULAR; INTRAVENOUS EVERY 6 HOURS PRN
Status: DISCONTINUED | OUTPATIENT
Start: 2017-04-13 | End: 2017-04-17

## 2017-04-13 RX ADMIN — HYDRALAZINE HYDROCHLORIDE 10 MG: 20 INJECTION INTRAMUSCULAR; INTRAVENOUS at 03:54

## 2017-04-13 RX ADMIN — DIPHENHYDRAMINE HYDROCHLORIDE 12.5 MG: 50 INJECTION, SOLUTION INTRAMUSCULAR; INTRAVENOUS at 14:19

## 2017-04-13 RX ADMIN — ACETAMINOPHEN 975 MG: 650 SUPPOSITORY RECTAL at 00:03

## 2017-04-13 RX ADMIN — FAMOTIDINE 20 MG: 10 INJECTION, SOLUTION INTRAVENOUS at 21:05

## 2017-04-13 RX ADMIN — FAMOTIDINE 20 MG: 10 INJECTION, SOLUTION INTRAVENOUS at 09:21

## 2017-04-13 RX ADMIN — CEFAZOLIN SODIUM 1000 MG: 1 INJECTION, SOLUTION INTRAVENOUS at 02:28

## 2017-04-13 RX ADMIN — LABETALOL HYDROCHLORIDE 10 MG: 5 INJECTION, SOLUTION INTRAVENOUS at 14:11

## 2017-04-13 RX ADMIN — SODIUM CHLORIDE, SODIUM LACTATE, POTASSIUM CHLORIDE, CALCIUM CHLORIDE AND DEXTROSE MONOHYDRATE: 5; 600; 310; 30; 20 INJECTION, SOLUTION INTRAVENOUS at 09:22

## 2017-04-13 RX ADMIN — CEFAZOLIN SODIUM 1000 MG: 1 INJECTION, SOLUTION INTRAVENOUS at 09:21

## 2017-04-13 RX ADMIN — LABETALOL HYDROCHLORIDE 10 MG: 5 INJECTION, SOLUTION INTRAVENOUS at 00:08

## 2017-04-13 RX ADMIN — SODIUM CHLORIDE, SODIUM LACTATE, POTASSIUM CHLORIDE, CALCIUM CHLORIDE AND DEXTROSE MONOHYDRATE: 5; 600; 310; 30; 20 INJECTION, SOLUTION INTRAVENOUS at 15:57

## 2017-04-13 ASSESSMENT — PAIN SCALES - GENERAL
PAINLEVEL_OUTOF10: 0
PAINLEVEL_OUTOF10: 2

## 2017-04-13 ASSESSMENT — ENCOUNTER SYMPTOMS
ABDOMINAL PAIN: 0
NAUSEA: 0
CHILLS: 0
SPUTUM PRODUCTION: 0
ORTHOPNEA: 0
COUGH: 0
FEVER: 0
PALPITATIONS: 0
SHORTNESS OF BREATH: 0

## 2017-04-13 NOTE — PROGRESS NOTES
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  No complaints, today, wants to eat an apple.  No flatus or BM   Review of Systems   Constitutional: Negative for fever and chills.   Respiratory: Negative for cough, sputum production and shortness of breath.    Cardiovascular: Negative for chest pain, palpitations and orthopnea.   Gastrointestinal: Negative for nausea and abdominal pain.   Musculoskeletal: Negative for joint pain.     Hemodynamics:  No data recorded.  Monitored Temp: 38 °C (100.4 °F)  Pulse  Av.1  Min: 47  Max: 105Heart Rate (Monitored): 89  Arterial BP: 148/53 mmHg, NIBP: 139/62 mmHg  CVP (mm Hg): (!) 8 MM HG  Respiratory:    Respiration: 14, Pulse Oximetry: 96 %, O2 Daily Delivery Respiratory : Silicone Nasal Cannula     PEP/CPT Method: Positive Airway Pressure Device, Work Of Breathing / Effort: Mild  RUL Breath Sounds: Clear, RML Breath Sounds: Clear, RLL Breath Sounds: Diminished, NORMA Breath Sounds: Clear, LLL Breath Sounds: Diminished  Neuro:  GCS Total [place] Coma Score: 15     Fluids:    Intake/Output Summary (Last 24 hours) at 17 1335  Last data filed at 17 1200   Gross per 24 hour   Intake 3903.2 ml   Output   1690 ml   Net 2213.2 ml     Weight: 73.4 kg (161 lb 13.1 oz)  Current Diet Order   Procedures   • DIET NPO     Physical Exam   Constitutional: He is oriented to person, place, and time.   Cardiovascular: Normal rate, regular rhythm and intact distal pulses.    But diminished on the left   Pulmonary/Chest: Effort normal and breath sounds normal. He has no wheezes.   Abdominal: Soft. He exhibits no distension. There is tenderness. There is no rebound.   Hypoactive BS   Musculoskeletal: Normal range of motion.   Neurological: He is alert and oriented to person, place, and time.   Skin: Skin is warm and dry.     Labs:  Recent Results (from the past 24  hour(s))   CBC WITH DIFFERENTIAL    Collection Time: 04/13/17  4:50 AM   Result Value Ref Range    WBC 15.3 (H) 4.8 - 10.8 K/uL    RBC 3.95 (L) 4.70 - 6.10 M/uL    Hemoglobin 11.2 (L) 14.0 - 18.0 g/dL    Hematocrit 34.4 (L) 42.0 - 52.0 %    MCV 87.1 81.4 - 97.8 fL    MCH 28.4 27.0 - 33.0 pg    MCHC 32.6 (L) 33.7 - 35.3 g/dL    RDW 46.5 35.9 - 50.0 fL    Platelet Count 83 (L) 164 - 446 K/uL    MPV 12.3 9.0 - 12.9 fL    Neutrophils-Polys 85.90 (H) 44.00 - 72.00 %    Lymphocytes 6.70 (L) 22.00 - 41.00 %    Monocytes 6.50 0.00 - 13.40 %    Eosinophils 0.10 0.00 - 6.90 %    Basophils 0.10 0.00 - 1.80 %    Immature Granulocytes 0.70 0.00 - 0.90 %    Nucleated RBC 0.00 /100 WBC    Neutrophils (Absolute) 13.14 (H) 1.82 - 7.42 K/uL    Lymphs (Absolute) 1.02 1.00 - 4.80 K/uL    Monos (Absolute) 1.00 (H) 0.00 - 0.85 K/uL    Eos (Absolute) 0.01 0.00 - 0.51 K/uL    Baso (Absolute) 0.02 0.00 - 0.12 K/uL    Immature Granulocytes (abs) 0.10 0.00 - 0.11 K/uL    NRBC (Absolute) 0.00 K/uL   BASIC METABOLIC PANEL    Collection Time: 04/13/17  4:50 AM   Result Value Ref Range    Sodium 137 135 - 145 mmol/L    Potassium 3.4 (L) 3.6 - 5.5 mmol/L    Chloride 108 96 - 112 mmol/L    Co2 24 20 - 33 mmol/L    Glucose 138 (H) 65 - 99 mg/dL    Bun 25 (H) 8 - 22 mg/dL    Creatinine 2.10 (H) 0.50 - 1.40 mg/dL    Calcium 7.3 (L) 8.5 - 10.5 mg/dL    Anion Gap 5.0 0.0 - 11.9   ESTIMATED GFR    Collection Time: 04/13/17  4:50 AM   Result Value Ref Range    GFR If  39 (A) >60 mL/min/1.73 m 2    GFR If Non  32 (A) >60 mL/min/1.73 m 2     Medical Decision Making, by Problem:  Active Hospital Problems    Diagnosis   • Aortic aneurysm without rupture (CMS-HCC) [I71.9]     Plan:  Stable H/H given fluid bolus and lots of IVF.  Will d/c NGT as it's output is relatively low, but continue NPO for now.  OK to transfer to the floor and ambulate,  Watch plts, Creatinine    Quality Measures:  Core Measures    Discussed patient  condition with Family, RN and Patient

## 2017-04-13 NOTE — CARE PLAN
Problem: Respiratory:  Goal: Respiratory status will improve  Monitoring oxygenation status, titrating oxygen via nasal cannula to maintain spO2 greater than 94%. Patient uses IS effectively with minimal prompting.     Problem: Pain Management  Goal: Pain level will decrease to patient’s comfort goal  Epidural in place for pain management. Assessing pain q2hr and PRN. Titrating epidural rate per MAR to maintain patient at tolerable pain level.

## 2017-04-13 NOTE — CARE PLAN
Problem: Safety  Goal: Will remain free from injury  Intervention: Provide assistance with mobility  Patient educated about fall risk. Demonstrates understanding, call light in reach. All questions answered.       Problem: Knowledge Deficit  Goal: Knowledge of disease process/condition, treatment plan, diagnostic tests, and medications will improve  Intervention: Assess knowledge level of disease process/condition, treatment plan, diagnostic tests, and medications  Pt updated on POC. Demonstrated understanding by teach back. All questions answered.

## 2017-04-13 NOTE — PROGRESS NOTES
1330 - Dr. Kerr at bedside. Plans to write transfer orders. Orders given for dc'ing vora, artline and NG. Plans to keep patient NPO at this time. Goal for ambulation and pass flatus.

## 2017-04-13 NOTE — CARE PLAN
Problem: Hyperinflation:  Goal: Prevent or improve atelectasis  Intervention: Perform hyperinflation therapy as indicated by assessment  Pt preforms PEP/IS with good effort, achieving 1500mL on the IS. BBS clear/diminished, SpO2 mid 90s with 3L NC.

## 2017-04-13 NOTE — DISCHARGE PLANNING
Care Transition Team Assessment  Called Fadi stark for CTT assessment. Pt lives with son and daughter-in-law in 2 story home in [address]. Pt was totally independent prior to admit. Pt has no insurance, MCaid pending, no PCP. Usually receives medical care in [place]. Aneurysm diagnosed in [place] but did not want to have surgery there. Post acute plans will be limited due to lack of insurance. Discharge needs currently undetermined. CTT will continue to follow  Information Source  Orientation : Oriented x 4  Information Given By: Relative  Informant's Name: Fadi  Who is responsible for making decisions for patient? : Patient         Elopement Risk  Legal Hold: No  Ambulatory or Self Mobile in Wheelchair: No-Not an Elopement Risk  Elopement Risk: Not at Risk for Elopement    Interdisciplinary Discharge Planning  Does Admitting Nurse Feel This Could be a Complex Discharge?: No  Primary Care Physician: none  Lives with - Patient's Self Care Capacity: Adult Children, Spouse  Patient or legal guardian wants to designate a caregiver (see row info): No  Support Systems: Family Member(s)  Housing / Facility: [address] [place] [address]  Do You Take your Prescribed Medications Regularly: Yes  Able to Return to Previous ADL's: Future Time w/Therapy  Mobility Issues: No  Prior Services: None  Patient Expects to be Discharged to:: home  Assistance Needed: Unknown at this Time  Durable Medical Equipment: Not Applicable    Discharge Preparedness  What are your discharge supports?: Child, Spouse  Prior Functional Level: Ambulatory, Independent with Activities of Daily Living, Independent with Medication Management    Functional Assesment  Prior Functional Level: Ambulatory, Independent with Activities of Daily Living, Independent with Medication Management    Finances  Financial Barriers to Discharge: Yes  Prescription Coverage: No    Vision / Hearing Impairment  Vision Impairment : Yes  Right Eye Vision: Wears Glasses (glasses  with the family)  Left Eye Vision: Wears Glasses  Hearing Impairment : No    Values / Beliefs / Concerns  Values / Beliefs Concerns : No    Advance Directive  Advance Directive?: None    Domestic Abuse  Have you ever been the victim of abuse or violence?: No  Physical Abuse or Sexual Abuse: No  Verbal Abuse or Emotional Abuse: No  Possible Abuse Reported to:: Not Applicable    Psychological Assessment  History of Substance Abuse: None  History of Psychiatric Problems: No    Discharge Risks or Barriers  Discharge risks or barriers?: No PCP, Uninsured / underinsured, Medicaid pending  Patient risk factors: Language barrier, Medicaid pending, No PCP, Uninsured or underinsured    Anticipated Discharge Information  Anticipated discharge disposition: Discharge needs currently unknown

## 2017-04-14 LAB
ANION GAP SERPL CALC-SCNC: 5 MMOL/L (ref 0–11.9)
BUN SERPL-MCNC: 19 MG/DL (ref 8–22)
CALCIUM SERPL-MCNC: 7.5 MG/DL (ref 8.5–10.5)
CHLORIDE SERPL-SCNC: 108 MMOL/L (ref 96–112)
CO2 SERPL-SCNC: 26 MMOL/L (ref 20–33)
CREAT SERPL-MCNC: 1.75 MG/DL (ref 0.5–1.4)
ERYTHROCYTE [DISTWIDTH] IN BLOOD BY AUTOMATED COUNT: 47 FL (ref 35.9–50)
GFR SERPL CREATININE-BSD FRML MDRD: 39 ML/MIN/1.73 M 2
GLUCOSE SERPL-MCNC: 150 MG/DL (ref 65–99)
HCT VFR BLD AUTO: 33.2 % (ref 42–52)
HGB BLD-MCNC: 10.8 G/DL (ref 14–18)
MCH RBC QN AUTO: 28.3 PG (ref 27–33)
MCHC RBC AUTO-ENTMCNC: 32.5 G/DL (ref 33.7–35.3)
MCV RBC AUTO: 87.1 FL (ref 81.4–97.8)
PLATELET # BLD AUTO: 77 K/UL (ref 164–446)
PMV BLD AUTO: 12.1 FL (ref 9–12.9)
POTASSIUM SERPL-SCNC: 3.3 MMOL/L (ref 3.6–5.5)
RBC # BLD AUTO: 3.81 M/UL (ref 4.7–6.1)
SODIUM SERPL-SCNC: 139 MMOL/L (ref 135–145)
WBC # BLD AUTO: 15.2 K/UL (ref 4.8–10.8)

## 2017-04-14 PROCEDURE — 700102 HCHG RX REV CODE 250 W/ 637 OVERRIDE(OP): Performed by: SURGERY

## 2017-04-14 PROCEDURE — 80048 BASIC METABOLIC PNL TOTAL CA: CPT

## 2017-04-14 PROCEDURE — 700101 HCHG RX REV CODE 250: Performed by: SURGERY

## 2017-04-14 PROCEDURE — 700105 HCHG RX REV CODE 258: Performed by: ANESTHESIOLOGY

## 2017-04-14 PROCEDURE — 700111 HCHG RX REV CODE 636 W/ 250 OVERRIDE (IP): Performed by: SURGERY

## 2017-04-14 PROCEDURE — 85027 COMPLETE CBC AUTOMATED: CPT

## 2017-04-14 PROCEDURE — 770006 HCHG ROOM/CARE - MED/SURG/GYN SEMI*

## 2017-04-14 PROCEDURE — 93922 UPR/L XTREMITY ART 2 LEVELS: CPT

## 2017-04-14 PROCEDURE — 93926 LOWER EXTREMITY STUDY: CPT

## 2017-04-14 PROCEDURE — 700111 HCHG RX REV CODE 636 W/ 250 OVERRIDE (IP): Performed by: ANESTHESIOLOGY

## 2017-04-14 PROCEDURE — A9270 NON-COVERED ITEM OR SERVICE: HCPCS | Performed by: SURGERY

## 2017-04-14 PROCEDURE — 94668 MNPJ CHEST WALL SBSQ: CPT

## 2017-04-14 RX ORDER — NIFEDIPINE 30 MG/1
30 TABLET, EXTENDED RELEASE ORAL EVERY EVENING
Status: DISCONTINUED | OUTPATIENT
Start: 2017-04-14 | End: 2017-04-21 | Stop reason: HOSPADM

## 2017-04-14 RX ORDER — METOPROLOL TARTRATE 100 MG/1
100 TABLET ORAL 2 TIMES DAILY
Status: DISCONTINUED | OUTPATIENT
Start: 2017-04-14 | End: 2017-04-21 | Stop reason: HOSPADM

## 2017-04-14 RX ORDER — POTASSIUM CHLORIDE 7.45 MG/ML
10 INJECTION INTRAVENOUS
Status: DISCONTINUED | OUTPATIENT
Start: 2017-04-14 | End: 2017-04-14

## 2017-04-14 RX ORDER — POTASSIUM CHLORIDE 29.8 MG/ML
40 INJECTION INTRAVENOUS ONCE
Status: COMPLETED | OUTPATIENT
Start: 2017-04-14 | End: 2017-04-14

## 2017-04-14 RX ORDER — ENALAPRIL MALEATE 5 MG/1
10 TABLET ORAL 2 TIMES DAILY
Status: DISCONTINUED | OUTPATIENT
Start: 2017-04-14 | End: 2017-04-21 | Stop reason: HOSPADM

## 2017-04-14 RX ORDER — ASPIRIN 325 MG
325 TABLET ORAL EVERY 6 HOURS PRN
Status: DISCONTINUED | OUTPATIENT
Start: 2017-04-14 | End: 2017-04-14

## 2017-04-14 RX ADMIN — ROPIVACAINE HYDROCHLORIDE: 10 INJECTION, SOLUTION EPIDURAL at 06:15

## 2017-04-14 RX ADMIN — HYDRALAZINE HYDROCHLORIDE 10 MG: 20 INJECTION INTRAMUSCULAR; INTRAVENOUS at 08:06

## 2017-04-14 RX ADMIN — LABETALOL HYDROCHLORIDE 10 MG: 5 INJECTION, SOLUTION INTRAVENOUS at 21:35

## 2017-04-14 RX ADMIN — METOPROLOL TARTRATE 100 MG: 100 TABLET, FILM COATED ORAL at 13:41

## 2017-04-14 RX ADMIN — LABETALOL HYDROCHLORIDE 10 MG: 5 INJECTION, SOLUTION INTRAVENOUS at 03:37

## 2017-04-14 RX ADMIN — ENALAPRIL MALEATE 10 MG: 10 TABLET ORAL at 13:42

## 2017-04-14 RX ADMIN — SODIUM CHLORIDE, SODIUM LACTATE, POTASSIUM CHLORIDE, CALCIUM CHLORIDE AND DEXTROSE MONOHYDRATE: 5; 600; 310; 30; 20 INJECTION, SOLUTION INTRAVENOUS at 00:36

## 2017-04-14 RX ADMIN — ONDANSETRON 4 MG: 2 INJECTION INTRAMUSCULAR; INTRAVENOUS at 18:09

## 2017-04-14 RX ADMIN — FAMOTIDINE 20 MG: 10 INJECTION, SOLUTION INTRAVENOUS at 08:00

## 2017-04-14 RX ADMIN — LABETALOL HYDROCHLORIDE 10 MG: 5 INJECTION, SOLUTION INTRAVENOUS at 23:56

## 2017-04-14 RX ADMIN — HYDRALAZINE HYDROCHLORIDE 10 MG: 20 INJECTION INTRAMUSCULAR; INTRAVENOUS at 03:05

## 2017-04-14 RX ADMIN — FAMOTIDINE 20 MG: 20 TABLET, FILM COATED ORAL at 21:04

## 2017-04-14 RX ADMIN — SODIUM CHLORIDE, SODIUM LACTATE, POTASSIUM CHLORIDE, CALCIUM CHLORIDE AND DEXTROSE MONOHYDRATE: 5; 600; 310; 30; 20 INJECTION, SOLUTION INTRAVENOUS at 07:55

## 2017-04-14 RX ADMIN — POTASSIUM CHLORIDE 40 MEQ: 29.8 INJECTION, SOLUTION INTRAVENOUS at 15:20

## 2017-04-14 RX ADMIN — ENALAPRIL MALEATE 10 MG: 10 TABLET ORAL at 21:04

## 2017-04-14 RX ADMIN — LABETALOL HYDROCHLORIDE 10 MG: 5 INJECTION, SOLUTION INTRAVENOUS at 11:04

## 2017-04-14 RX ADMIN — METOPROLOL TARTRATE 100 MG: 100 TABLET, FILM COATED ORAL at 21:04

## 2017-04-14 RX ADMIN — NIFEDIPINE 30 MG: 30 TABLET, FILM COATED, EXTENDED RELEASE ORAL at 21:04

## 2017-04-14 RX ADMIN — SODIUM CHLORIDE, SODIUM LACTATE, POTASSIUM CHLORIDE, CALCIUM CHLORIDE AND DEXTROSE MONOHYDRATE: 5; 600; 310; 30; 20 INJECTION, SOLUTION INTRAVENOUS at 22:43

## 2017-04-14 RX ADMIN — LABETALOL HYDROCHLORIDE 10 MG: 5 INJECTION, SOLUTION INTRAVENOUS at 14:04

## 2017-04-14 RX ADMIN — LABETALOL HYDROCHLORIDE 10 MG: 5 INJECTION, SOLUTION INTRAVENOUS at 06:03

## 2017-04-14 ASSESSMENT — ENCOUNTER SYMPTOMS
VOMITING: 0
COUGH: 0
FEVER: 0
PALPITATIONS: 0
NAUSEA: 0
FOCAL WEAKNESS: 0
SHORTNESS OF BREATH: 0
CHILLS: 0
CLAUDICATION: 0
DIZZINESS: 0
HEARTBURN: 0

## 2017-04-14 ASSESSMENT — PAIN SCALES - GENERAL
PAINLEVEL_OUTOF10: 0
PAINLEVEL_OUTOF10: 5
PAINLEVEL_OUTOF10: 0

## 2017-04-14 NOTE — PROGRESS NOTES
Surgical Progress Note    Author: Charissa Kerr Date & Time created: 2017   12:38 PM     Interval Events:  POD#3 s/p repair of AAA with horseshoe kidney.  States he has had a small amount of flatus.  Denies pain.  Feels thirsty like after a night drinking tequila.   Review of Systems   Constitutional: Negative for fever and chills.   Respiratory: Negative for cough and shortness of breath.    Cardiovascular: Negative for chest pain, palpitations and claudication.   Gastrointestinal: Negative for heartburn, nausea and vomiting.        Hungry!!   Genitourinary: Negative for dysuria.   Musculoskeletal: Negative for joint pain.   Neurological: Negative for dizziness and focal weakness.     Hemodynamics:  Temp (24hrs), Av.2 °C (99 °F), Min:37.1 °C (98.8 °F), Max:37.3 °C (99.2 °F)  Temperature: 37.3 °C (99.1 °F), Monitored Temp: 38 °C (100.4 °F)  Pulse  Av.9  Min: 47  Max: 113Heart Rate (Monitored): 96  Arterial BP: (!) 165/63 mmHg, NIBP: 152/71 mmHg    Respiratory:    Respiration: (!) 28, Pulse Oximetry: 97 %, O2 Daily Delivery Respiratory : Silicone Nasal Cannula     PEP/CPT Method: Positive Airway Pressure Device, Work Of Breathing / Effort: Mild  RUL Breath Sounds: Clear, RML Breath Sounds: Clear, RLL Breath Sounds: Diminished, NORMA Breath Sounds: Clear, LLL Breath Sounds: Diminished  Neuro:  GCS Total [place] Coma Score: 15     Fluids:    Intake/Output Summary (Last 24 hours) at 17 1238  Last data filed at 17 1200   Gross per 24 hour   Intake   3148 ml   Output   1875 ml   Net   1273 ml     Weight: 72.5 kg (159 lb 13.3 oz)  Current Diet Order   Procedures   • DIET NPO     Physical Exam   Constitutional: He is oriented to person, place, and time. He appears well-developed and well-nourished.   Eyes: Pupils are equal, round, and reactive to light.   Neck: Neck supple.   Cardiovascular: Normal rate, regular rhythm and intact distal pulses.    Diminished pulses on the left   Pulmonary/Chest:  Effort normal and breath sounds normal. He has no wheezes.   Abdominal: Soft. He exhibits no distension.   Genitourinary: Penis normal.   Musculoskeletal: Normal range of motion. He exhibits no edema.   Neurological: He is alert and oriented to person, place, and time.     Labs:  Recent Results (from the past 24 hour(s))   CBC WITHOUT DIFFERENTIAL    Collection Time: 04/14/17  5:10 AM   Result Value Ref Range    WBC 15.2 (H) 4.8 - 10.8 K/uL    RBC 3.81 (L) 4.70 - 6.10 M/uL    Hemoglobin 10.8 (L) 14.0 - 18.0 g/dL    Hematocrit 33.2 (L) 42.0 - 52.0 %    MCV 87.1 81.4 - 97.8 fL    MCH 28.3 27.0 - 33.0 pg    MCHC 32.5 (L) 33.7 - 35.3 g/dL    RDW 47.0 35.9 - 50.0 fL    Platelet Count 77 (L) 164 - 446 K/uL    MPV 12.1 9.0 - 12.9 fL   BASIC METABOLIC PANEL    Collection Time: 04/14/17  5:10 AM   Result Value Ref Range    Sodium 139 135 - 145 mmol/L    Potassium 3.3 (L) 3.6 - 5.5 mmol/L    Chloride 108 96 - 112 mmol/L    Co2 26 20 - 33 mmol/L    Glucose 150 (H) 65 - 99 mg/dL    Bun 19 8 - 22 mg/dL    Creatinine 1.75 (H) 0.50 - 1.40 mg/dL    Calcium 7.5 (L) 8.5 - 10.5 mg/dL    Anion Gap 5.0 0.0 - 11.9   ESTIMATED GFR    Collection Time: 04/14/17  5:10 AM   Result Value Ref Range    GFR If  48 (A) >60 mL/min/1.73 m 2    GFR If Non  39 (A) >60 mL/min/1.73 m 2     Medical Decision Making, by Problem:  Active Hospital Problems    Diagnosis   • Aortic aneurysm without rupture (CMS-HCC) [I71.9]   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  He will probably need angiography to identify the area of abnormality and problem prior to discharge.  I don't want to do a CTA with elevated creatinine and hopefully, the left limb of the graft remains patent.  I believe this is part of his chronic complaints.   Plan:  Hypertensive.  Will start oral anti-hypertensive medications and clear liquids.  Ambulate and general surgery bed  when available.    Quality Measures:    Lawrence catheter: No Lawrence  Central line in place: Need for access    DVT Prophylaxis: Contraindicated - High bleeding risk and Not indicated at this time, ambulatory  DVT prophylaxis - mechanical: SCDs      Assessed for rehab: Patient unable to tolerate rehabilitation therapeutic regimen      Discussed patient condition with Family, RN and Patient

## 2017-04-14 NOTE — CARE PLAN
Problem: Pain Management  Goal: Pain level will decrease to patient’s comfort goal  Epidural in place for pain management per MAR. Assessing pain level q2hr and PRN, no complaints of pain at this time. Anesthesia services following for epidural management.    Problem: Mobility  Goal: Risk for activity intolerance will decrease  Patient mobilized with x1 assistance, walked approx. 60 feet through hallway while pushing wheelchair for balance. Tolerated well.

## 2017-04-14 NOTE — CARE PLAN
Problem: Hyperinflation:  Goal: Prevent or improve atelectasis  Outcome: PROGRESSING AS EXPECTED  Intervention: Instruct incentive spirometry usage  Continue IS

## 2017-04-15 PROCEDURE — A9270 NON-COVERED ITEM OR SERVICE: HCPCS | Performed by: SURGERY

## 2017-04-15 PROCEDURE — 700111 HCHG RX REV CODE 636 W/ 250 OVERRIDE (IP): Performed by: SURGERY

## 2017-04-15 PROCEDURE — A9270 NON-COVERED ITEM OR SERVICE: HCPCS | Performed by: ANESTHESIOLOGY

## 2017-04-15 PROCEDURE — 700102 HCHG RX REV CODE 250 W/ 637 OVERRIDE(OP): Performed by: SURGERY

## 2017-04-15 PROCEDURE — 94668 MNPJ CHEST WALL SBSQ: CPT

## 2017-04-15 PROCEDURE — 700102 HCHG RX REV CODE 250 W/ 637 OVERRIDE(OP): Performed by: ANESTHESIOLOGY

## 2017-04-15 PROCEDURE — 770006 HCHG ROOM/CARE - MED/SURG/GYN SEMI*

## 2017-04-15 RX ORDER — GUAIFENESIN 600 MG/1
600 TABLET, EXTENDED RELEASE ORAL EVERY 12 HOURS
Status: DISCONTINUED | OUTPATIENT
Start: 2017-04-15 | End: 2017-04-21 | Stop reason: HOSPADM

## 2017-04-15 RX ORDER — FLUTICASONE PROPIONATE 50 MCG
2 SPRAY, SUSPENSION (ML) NASAL 2 TIMES DAILY
Status: DISCONTINUED | OUTPATIENT
Start: 2017-04-15 | End: 2017-04-21 | Stop reason: HOSPADM

## 2017-04-15 RX ORDER — FAMOTIDINE 20 MG/1
20 TABLET, FILM COATED ORAL DAILY
Status: DISCONTINUED | OUTPATIENT
Start: 2017-04-16 | End: 2017-04-21 | Stop reason: HOSPADM

## 2017-04-15 RX ADMIN — ACETAMINOPHEN 1000 MG: 500 TABLET, FILM COATED ORAL at 18:01

## 2017-04-15 RX ADMIN — LABETALOL HYDROCHLORIDE 10 MG: 5 INJECTION, SOLUTION INTRAVENOUS at 10:04

## 2017-04-15 RX ADMIN — ONDANSETRON 4 MG: 2 INJECTION INTRAMUSCULAR; INTRAVENOUS at 13:28

## 2017-04-15 RX ADMIN — LABETALOL HYDROCHLORIDE 10 MG: 5 INJECTION, SOLUTION INTRAVENOUS at 01:33

## 2017-04-15 RX ADMIN — ENALAPRIL MALEATE 10 MG: 10 TABLET ORAL at 08:51

## 2017-04-15 RX ADMIN — HYDRALAZINE HYDROCHLORIDE 10 MG: 20 INJECTION INTRAMUSCULAR; INTRAVENOUS at 01:57

## 2017-04-15 RX ADMIN — DIPHENHYDRAMINE HCL 12.5 MG: 25 TABLET ORAL at 10:00

## 2017-04-15 RX ADMIN — METOPROLOL TARTRATE 100 MG: 100 TABLET, FILM COATED ORAL at 20:46

## 2017-04-15 RX ADMIN — GUAIFENESIN 600 MG: 600 TABLET, EXTENDED RELEASE ORAL at 20:46

## 2017-04-15 RX ADMIN — ENALAPRIL MALEATE 10 MG: 10 TABLET ORAL at 20:46

## 2017-04-15 RX ADMIN — SODIUM CHLORIDE, SODIUM LACTATE, POTASSIUM CHLORIDE, CALCIUM CHLORIDE AND DEXTROSE MONOHYDRATE: 5; 600; 310; 30; 20 INJECTION, SOLUTION INTRAVENOUS at 22:16

## 2017-04-15 RX ADMIN — NIFEDIPINE 30 MG: 30 TABLET, FILM COATED, EXTENDED RELEASE ORAL at 20:46

## 2017-04-15 RX ADMIN — HYDRALAZINE HYDROCHLORIDE 10 MG: 20 INJECTION INTRAMUSCULAR; INTRAVENOUS at 10:44

## 2017-04-15 RX ADMIN — METOPROLOL TARTRATE 100 MG: 100 TABLET, FILM COATED ORAL at 08:51

## 2017-04-15 RX ADMIN — HYDRALAZINE HYDROCHLORIDE 10 MG: 20 INJECTION INTRAMUSCULAR; INTRAVENOUS at 00:33

## 2017-04-15 ASSESSMENT — PAIN SCALES - GENERAL
PAINLEVEL_OUTOF10: 1
PAINLEVEL_OUTOF10: 0
PAINLEVEL_OUTOF10: 2
PAINLEVEL_OUTOF10: 0
PAINLEVEL_OUTOF10: 1
PAINLEVEL_OUTOF10: 0

## 2017-04-15 ASSESSMENT — ENCOUNTER SYMPTOMS
MUSCULOSKELETAL NEGATIVE: 1
NAUSEA: 1
NEUROLOGICAL NEGATIVE: 1

## 2017-04-15 NOTE — CARE PLAN
Problem: Knowledge Deficit  Goal: Knowledge of disease process/condition, treatment plan, diagnostic tests, and medications will improve  Intervention: Assess knowledge level of disease process/condition, treatment plan, diagnostic tests, and medications  Pt updated on POC. Demonstrated understanding by teach back. All questions answered.           Problem: Mobility  Goal: Risk for activity intolerance will decrease  Intervention: Assess and monitor signs of activity intolerance  Patient ambulated 200 ft with FWW. Needed a lot of encouragement, though after ambulation patient requested to go again. Pt educated the importance of ambulation.

## 2017-04-15 NOTE — PROGRESS NOTES
Anesthesia Pain Service    Patient is POD #3 s/p AAA repair on 4/11 by Dr Kerr.  Nursing and family are at bedside when the patient is seen and evaluated for pain management.  An epidural is running at 5cc/hr, which per both nursing and family report, is providing acceptable pain control.  Patient is able to void urine successfully and states that he is a bit cold today when seen.  The anesthesia pain service will continue to follw this patient closely.  Nursing is aware how to effectively contact the pain service should any future concerns arise

## 2017-04-15 NOTE — CARE PLAN
Problem: Safety  Goal: Will remain free from falls  Non slip socks on patient. Room close to nursing station.    Problem: Pain Management  Goal: Pain level will decrease to patient’s comfort goal  Frequent pain assessments. Medication adjusted as needed.

## 2017-04-15 NOTE — CARE PLAN
Problem: Safety  Goal: Will remain free from falls  Intervention: Implement fall precautions  Bed locked and in low position with brake set. Treaded slipper socks on patient and call light within reach. Patient educated not to get out of bed without assistance and verbalizes understanding. Bed alarm on and patient in room near nurses station.       Problem: Pain Management  Goal: Pain level will decrease to patient’s comfort goal  Intervention: Follow pain managment plan developed in collaboration with patient and Interdisciplinary Team  Dilaudid/Ropivacaine epidural in place per the MAR for pain management.       Problem: Mobility  Goal: Risk for activity intolerance will decrease  Intervention: Encourage patient to increase activity level in collaboration with Interdisciplinary Team  Patient mobilized/ambulated around unit using wheelchair push. Tolerated well.

## 2017-04-15 NOTE — PROGRESS NOTES
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  Review of Systems   Gastrointestinal: Positive for nausea.   Musculoskeletal: Negative.         Moves bilateral lower extremities normally.  Denies any ischemic symptoms despite reduced flow left.   Neurological: Negative.      Hemodynamics:  Temp (24hrs), Av.3 °C (99.1 °F), Min:37 °C (98.6 °F), Max:37.5 °C (99.5 °F)  Temperature: 37.4 °C (99.4 °F)  Pulse  Av.3  Min: 47  Max: 113Heart Rate (Monitored): 93  NIBP: 153/76 mmHg    Respiratory:    Respiration: (!) 30, Pulse Oximetry: 93 %, O2 Daily Delivery Respiratory : Silicone Nasal Cannula     PEP/CPT Method: Positive Airway Pressure Device, Work Of Breathing / Effort: Mild  RUL Breath Sounds: Clear, RML Breath Sounds: Clear, RLL Breath Sounds: Diminished, NORMA Breath Sounds: Clear, LLL Breath Sounds: Diminished  Neuro:  GCS Total Tangent Coma Score: 15     Fluids:    Intake/Output Summary (Last 24 hours) at 04/15/17 1333  Last data filed at 04/15/17 1200   Gross per 24 hour   Intake 2515.9 ml   Output   1481 ml   Net 1034.9 ml     Weight: 75.3 kg (166 lb 0.1 oz)  Current Diet Order   Procedures   • DIET ORDER     Physical Exam   Constitutional: He is oriented to person, place, and time.   Cardiovascular:   Palpable right femoral and distal pulses.  Left are not palpable  But excellent doppler femoral, DP and PT   Pulmonary/Chest: No respiratory distress. He has no wheezes. He has no rales.   Abdominal: He exhibits no distension.   Musculoskeletal: He exhibits no edema.   Neurological: He is alert and oriented to person, place, and time.   Skin: Skin is warm and dry. No erythema.   Incision intact.  Stapled  Feet no skin lesions, nails normal.   Psychiatric: He has a normal mood and affect.     Labs:  No results found for this or any previous visit (from the past 24 hour(s)).  Medical Decision  Making, by Problem:  Active Hospital Problems    Diagnosis   • Aortic aneurysm without rupture (CMS-HCC) [I71.9]     Plan:  Observe GI status and left lower extremity perfusion    Quality Measures:  Core Measures    Discussed patient condition with Family and RN

## 2017-04-16 PROCEDURE — 700102 HCHG RX REV CODE 250 W/ 637 OVERRIDE(OP): Performed by: SURGERY

## 2017-04-16 PROCEDURE — 700111 HCHG RX REV CODE 636 W/ 250 OVERRIDE (IP): Performed by: SURGERY

## 2017-04-16 PROCEDURE — A9270 NON-COVERED ITEM OR SERVICE: HCPCS | Performed by: SURGERY

## 2017-04-16 PROCEDURE — 770006 HCHG ROOM/CARE - MED/SURG/GYN SEMI*

## 2017-04-16 PROCEDURE — 700111 HCHG RX REV CODE 636 W/ 250 OVERRIDE (IP): Performed by: ANESTHESIOLOGY

## 2017-04-16 PROCEDURE — 700102 HCHG RX REV CODE 250 W/ 637 OVERRIDE(OP): Performed by: ANESTHESIOLOGY

## 2017-04-16 PROCEDURE — A9270 NON-COVERED ITEM OR SERVICE: HCPCS | Performed by: ANESTHESIOLOGY

## 2017-04-16 PROCEDURE — 700105 HCHG RX REV CODE 258: Performed by: ANESTHESIOLOGY

## 2017-04-16 RX ORDER — BISACODYL 10 MG
10 SUPPOSITORY, RECTAL RECTAL ONCE
Status: COMPLETED | OUTPATIENT
Start: 2017-04-16 | End: 2017-04-16

## 2017-04-16 RX ADMIN — ACETAMINOPHEN 1000 MG: 500 TABLET, FILM COATED ORAL at 18:33

## 2017-04-16 RX ADMIN — SODIUM CHLORIDE, SODIUM LACTATE, POTASSIUM CHLORIDE, CALCIUM CHLORIDE AND DEXTROSE MONOHYDRATE: 5; 600; 310; 30; 20 INJECTION, SOLUTION INTRAVENOUS at 09:32

## 2017-04-16 RX ADMIN — FAMOTIDINE 20 MG: 20 TABLET, FILM COATED ORAL at 09:19

## 2017-04-16 RX ADMIN — METOPROLOL TARTRATE 100 MG: 100 TABLET, FILM COATED ORAL at 09:19

## 2017-04-16 RX ADMIN — SODIUM CHLORIDE, SODIUM LACTATE, POTASSIUM CHLORIDE, CALCIUM CHLORIDE AND DEXTROSE MONOHYDRATE: 5; 600; 310; 30; 20 INJECTION, SOLUTION INTRAVENOUS at 21:13

## 2017-04-16 RX ADMIN — NIFEDIPINE 30 MG: 30 TABLET, FILM COATED, EXTENDED RELEASE ORAL at 21:06

## 2017-04-16 RX ADMIN — BISACODYL 10 MG: 10 SUPPOSITORY RECTAL at 14:09

## 2017-04-16 RX ADMIN — ROPIVACAINE HYDROCHLORIDE: 10 INJECTION, SOLUTION EPIDURAL at 14:01

## 2017-04-16 RX ADMIN — METOPROLOL TARTRATE 100 MG: 100 TABLET, FILM COATED ORAL at 21:09

## 2017-04-16 RX ADMIN — FLUTICASONE PROPIONATE 100 MCG: 50 SPRAY, METERED NASAL at 09:21

## 2017-04-16 RX ADMIN — ENALAPRIL MALEATE 10 MG: 10 TABLET ORAL at 09:20

## 2017-04-16 RX ADMIN — GUAIFENESIN 600 MG: 600 TABLET, EXTENDED RELEASE ORAL at 21:06

## 2017-04-16 RX ADMIN — DIPHENHYDRAMINE HCL 12.5 MG: 25 TABLET ORAL at 21:08

## 2017-04-16 RX ADMIN — ACETAMINOPHEN 1000 MG: 500 TABLET, FILM COATED ORAL at 14:00

## 2017-04-16 RX ADMIN — GUAIFENESIN 600 MG: 600 TABLET, EXTENDED RELEASE ORAL at 09:19

## 2017-04-16 RX ADMIN — ONDANSETRON 4 MG: 2 INJECTION INTRAMUSCULAR; INTRAVENOUS at 09:13

## 2017-04-16 RX ADMIN — ACETAMINOPHEN 1000 MG: 500 TABLET, FILM COATED ORAL at 06:00

## 2017-04-16 RX ADMIN — ENALAPRIL MALEATE 10 MG: 10 TABLET ORAL at 21:06

## 2017-04-16 ASSESSMENT — PAIN SCALES - GENERAL
PAINLEVEL_OUTOF10: 6
PAINLEVEL_OUTOF10: 2
PAINLEVEL_OUTOF10: 6

## 2017-04-16 NOTE — PROGRESS NOTES
Patient seen and interviewed while ambulating.  used. He has adequate analgesia, but remains on clear liquids. Epidural site C/D/I per report. Will continue epidural ropivicaine/hydromorphone infusion at 5ml/hr until tolerating diet advancement.

## 2017-04-16 NOTE — PROGRESS NOTES
Pt is A/O x4. Vital signs stable on 6L oxy-mask. Algerian speaking and family at bedside. Pt ambulated 250 ft tolerated well. Pt is currently resting. Mid-line incision with staples to room air observed. IV fluids infusing. Tolerating clear liquid diet. POC discussed with patient and family. Hourly rounding in place.

## 2017-04-16 NOTE — PROGRESS NOTES
Pt A&O x 4. Frisian speaking only. Resting calmly in bed. No distress noted. Family at bedside.   Epidural in place. Running 5 ml/hr. Site CDI. IVF running. VSS. 5L O2 via oxy mask in use due to congestion.   Midline incision with staples LANA/CDI. Up x1 ast to BR. +BM this shift, +void, +BS. Tolerating Clear liquid diet. No nausea/vomiting. All needs met at this time. Hourly rounding in place.

## 2017-04-16 NOTE — PROGRESS NOTES
Surgical Progress Note    Author: Jersey Ruiz Date & Time created: 2017   12:17 PM     Interval Events:  POD#5 s/p repair of AAA with horse shoe kidney.  Review of Systems   Gastrointestinal:        Bloating and colic.  On clears but not taking much.  Some flatus this AM   Musculoskeletal:        Walking with assist.  Left lateral hip hurts with walking but not in bed.     Hemodynamics:  Temp (24hrs), Av.6 °C (97.9 °F), Min:36.4 °C (97.5 °F), Max:36.9 °C (98.5 °F)  Temperature: 36.9 °C (98.5 °F)  Pulse  Av.4  Min: 47  Max: 113   Blood Pressure : 131/69 mmHg    Respiratory:    Respiration: 18, Pulse Oximetry: 97 %, O2 Daily Delivery Respiratory : OxyMask     Work Of Breathing / Effort: Mild  RUL Breath Sounds: Clear, RML Breath Sounds: Clear, RLL Breath Sounds: Diminished, NORMA Breath Sounds: Diminished, LLL Breath Sounds: Diminished  Neuro:  GCS Total Maverick Coma Score: 15     Fluids:    Intake/Output Summary (Last 24 hours) at 17 1217  Last data filed at 17 0900   Gross per 24 hour   Intake    480 ml   Output    550 ml   Net    -70 ml        Current Diet Order   Procedures   • DIET ORDER     Physical Exam   Constitutional: He is oriented to person, place, and time.   Cardiovascular: Normal heart sounds.    Left femoral pulse weak.   Pulmonary/Chest: Breath sounds normal. He has no wheezes.   Abdominal: He exhibits distension. There is no rebound and no guarding.   Musculoskeletal: He exhibits no edema.   Neurological: He is alert and oriented to person, place, and time.   Psychiatric: He has a normal mood and affect. His behavior is normal.     Labs:  No results found for this or any previous visit (from the past 24 hour(s)).  Medical Decision Making, by Problem:  Active Hospital Problems    Diagnosis   • Aortic aneurysm without rupture (CMS-[facility]) [I71.9]     Plan:  Dulcolax suppository.  Possible left iliac angiogram and stent tomorrow.  Discussed.    Quality Measures:  Core  Measures    Discussed patient condition with Family and Patient

## 2017-04-17 ENCOUNTER — APPOINTMENT (OUTPATIENT)
Dept: RADIOLOGY | Facility: MEDICAL CENTER | Age: 67
DRG: 269 | End: 2017-04-17
Attending: SURGERY
Payer: MEDICAID

## 2017-04-17 LAB
ANION GAP SERPL CALC-SCNC: 6 MMOL/L (ref 0–11.9)
BUN SERPL-MCNC: 9 MG/DL (ref 8–22)
CALCIUM SERPL-MCNC: 7.8 MG/DL (ref 8.5–10.5)
CHLORIDE SERPL-SCNC: 102 MMOL/L (ref 96–112)
CO2 SERPL-SCNC: 30 MMOL/L (ref 20–33)
CREAT SERPL-MCNC: 1.16 MG/DL (ref 0.5–1.4)
ERYTHROCYTE [DISTWIDTH] IN BLOOD BY AUTOMATED COUNT: 43.8 FL (ref 35.9–50)
GFR SERPL CREATININE-BSD FRML MDRD: >60 ML/MIN/1.73 M 2
GLUCOSE SERPL-MCNC: 108 MG/DL (ref 65–99)
HCT VFR BLD AUTO: 31.8 % (ref 42–52)
HGB BLD-MCNC: 10.5 G/DL (ref 14–18)
MCH RBC QN AUTO: 27.9 PG (ref 27–33)
MCHC RBC AUTO-ENTMCNC: 33 G/DL (ref 33.7–35.3)
MCV RBC AUTO: 84.4 FL (ref 81.4–97.8)
PLATELET # BLD AUTO: 139 K/UL (ref 164–446)
PMV BLD AUTO: 11.5 FL (ref 9–12.9)
POTASSIUM SERPL-SCNC: 2.8 MMOL/L (ref 3.6–5.5)
RBC # BLD AUTO: 3.77 M/UL (ref 4.7–6.1)
SODIUM SERPL-SCNC: 138 MMOL/L (ref 135–145)
WBC # BLD AUTO: 7.2 K/UL (ref 4.8–10.8)

## 2017-04-17 PROCEDURE — 700102 HCHG RX REV CODE 250 W/ 637 OVERRIDE(OP): Performed by: SURGERY

## 2017-04-17 PROCEDURE — A9270 NON-COVERED ITEM OR SERVICE: HCPCS | Performed by: ANESTHESIOLOGY

## 2017-04-17 PROCEDURE — B41G1ZZ FLUOROSCOPY OF LEFT LOWER EXTREMITY ARTERIES USING LOW OSMOLAR CONTRAST: ICD-10-PCS | Performed by: SURGERY

## 2017-04-17 PROCEDURE — 75625 CONTRAST EXAM ABDOMINL AORTA: CPT

## 2017-04-17 PROCEDURE — 700111 HCHG RX REV CODE 636 W/ 250 OVERRIDE (IP): Performed by: SURGERY

## 2017-04-17 PROCEDURE — 700102 HCHG RX REV CODE 250 W/ 637 OVERRIDE(OP): Performed by: ANESTHESIOLOGY

## 2017-04-17 PROCEDURE — 37221 HCHG STENT PLACE W/WO ANGIO ILIAC ARTERY: CPT

## 2017-04-17 PROCEDURE — B4101ZZ FLUOROSCOPY OF ABDOMINAL AORTA USING LOW OSMOLAR CONTRAST: ICD-10-PCS | Performed by: SURGERY

## 2017-04-17 PROCEDURE — C1760 CLOSURE DEV, VASC: HCPCS

## 2017-04-17 PROCEDURE — 770001 HCHG ROOM/CARE - MED/SURG/GYN PRIV*

## 2017-04-17 PROCEDURE — 047J3DZ DILATION OF LEFT EXTERNAL ILIAC ARTERY WITH INTRALUMINAL DEVICE, PERCUTANEOUS APPROACH: ICD-10-PCS | Performed by: SURGERY

## 2017-04-17 PROCEDURE — A9270 NON-COVERED ITEM OR SERVICE: HCPCS | Performed by: SURGERY

## 2017-04-17 PROCEDURE — 700111 HCHG RX REV CODE 636 W/ 250 OVERRIDE (IP)

## 2017-04-17 PROCEDURE — 700111 HCHG RX REV CODE 636 W/ 250 OVERRIDE (IP): Performed by: ANESTHESIOLOGY

## 2017-04-17 PROCEDURE — 85027 COMPLETE CBC AUTOMATED: CPT

## 2017-04-17 PROCEDURE — 80048 BASIC METABOLIC PNL TOTAL CA: CPT

## 2017-04-17 PROCEDURE — 36200 PLACE CATHETER IN AORTA: CPT

## 2017-04-17 RX ORDER — MORPHINE SULFATE 4 MG/ML
3 INJECTION, SOLUTION INTRAMUSCULAR; INTRAVENOUS
Status: DISCONTINUED | OUTPATIENT
Start: 2017-04-17 | End: 2017-04-21

## 2017-04-17 RX ORDER — ONDANSETRON 2 MG/ML
4 INJECTION INTRAMUSCULAR; INTRAVENOUS PRN
Status: DISPENSED | OUTPATIENT
Start: 2017-04-17 | End: 2017-04-17

## 2017-04-17 RX ORDER — HEPARIN SODIUM 1000 [USP'U]/ML
5000 INJECTION, SOLUTION INTRAVENOUS; SUBCUTANEOUS ONCE
Status: COMPLETED | OUTPATIENT
Start: 2017-04-17 | End: 2017-04-17

## 2017-04-17 RX ORDER — MORPHINE SULFATE 4 MG/ML
2 INJECTION, SOLUTION INTRAMUSCULAR; INTRAVENOUS
Status: COMPLETED | OUTPATIENT
Start: 2017-04-17 | End: 2017-04-17

## 2017-04-17 RX ORDER — CLOPIDOGREL BISULFATE 75 MG/1
75 TABLET ORAL DAILY
Status: DISCONTINUED | OUTPATIENT
Start: 2017-04-17 | End: 2017-04-21 | Stop reason: HOSPADM

## 2017-04-17 RX ORDER — SODIUM CHLORIDE 9 MG/ML
500 INJECTION, SOLUTION INTRAVENOUS PRN
Status: DISCONTINUED | OUTPATIENT
Start: 2017-04-17 | End: 2017-04-21 | Stop reason: HOSPADM

## 2017-04-17 RX ORDER — POTASSIUM CHLORIDE 20 MEQ/1
20 TABLET, EXTENDED RELEASE ORAL 2 TIMES DAILY
Status: DISCONTINUED | OUTPATIENT
Start: 2017-04-17 | End: 2017-04-21 | Stop reason: HOSPADM

## 2017-04-17 RX ORDER — MIDAZOLAM HYDROCHLORIDE 1 MG/ML
.5-2 INJECTION INTRAMUSCULAR; INTRAVENOUS PRN
Status: ACTIVE | OUTPATIENT
Start: 2017-04-17 | End: 2017-04-17

## 2017-04-17 RX ADMIN — GUAIFENESIN 600 MG: 600 TABLET, EXTENDED RELEASE ORAL at 21:57

## 2017-04-17 RX ADMIN — HYDRALAZINE HYDROCHLORIDE 10 MG: 20 INJECTION INTRAMUSCULAR; INTRAVENOUS at 08:53

## 2017-04-17 RX ADMIN — FENTANYL CITRATE 25 MCG: 50 INJECTION, SOLUTION INTRAMUSCULAR; INTRAVENOUS at 10:34

## 2017-04-17 RX ADMIN — MORPHINE SULFATE 2 MG: 4 INJECTION INTRAVENOUS at 05:09

## 2017-04-17 RX ADMIN — HYDRALAZINE HYDROCHLORIDE 10 MG: 20 INJECTION INTRAMUSCULAR; INTRAVENOUS at 23:30

## 2017-04-17 RX ADMIN — NIFEDIPINE 30 MG: 30 TABLET, FILM COATED, EXTENDED RELEASE ORAL at 21:57

## 2017-04-17 RX ADMIN — CLOPIDOGREL BISULFATE 75 MG: 75 TABLET, FILM COATED ORAL at 14:46

## 2017-04-17 RX ADMIN — MORPHINE SULFATE 3 MG: 4 INJECTION INTRAVENOUS at 18:36

## 2017-04-17 RX ADMIN — SODIUM CHLORIDE, SODIUM LACTATE, POTASSIUM CHLORIDE, CALCIUM CHLORIDE AND DEXTROSE MONOHYDRATE: 5; 600; 310; 30; 20 INJECTION, SOLUTION INTRAVENOUS at 05:42

## 2017-04-17 RX ADMIN — ACETAMINOPHEN 1000 MG: 500 TABLET, FILM COATED ORAL at 00:51

## 2017-04-17 RX ADMIN — MIDAZOLAM HYDROCHLORIDE 1 MG: 1 INJECTION, SOLUTION INTRAMUSCULAR; INTRAVENOUS at 10:34

## 2017-04-17 RX ADMIN — HYDRALAZINE HYDROCHLORIDE 10 MG: 20 INJECTION INTRAMUSCULAR; INTRAVENOUS at 14:47

## 2017-04-17 RX ADMIN — HYDRALAZINE HYDROCHLORIDE 10 MG: 20 INJECTION INTRAMUSCULAR; INTRAVENOUS at 02:00

## 2017-04-17 RX ADMIN — HYDRALAZINE HYDROCHLORIDE 10 MG: 20 INJECTION INTRAMUSCULAR; INTRAVENOUS at 01:35

## 2017-04-17 RX ADMIN — HYDRALAZINE HYDROCHLORIDE 10 MG: 20 INJECTION INTRAMUSCULAR; INTRAVENOUS at 00:55

## 2017-04-17 RX ADMIN — FENTANYL CITRATE 25 MCG: 50 INJECTION, SOLUTION INTRAMUSCULAR; INTRAVENOUS at 11:25

## 2017-04-17 RX ADMIN — MIDAZOLAM HYDROCHLORIDE 1 MG: 1 INJECTION, SOLUTION INTRAMUSCULAR; INTRAVENOUS at 11:29

## 2017-04-17 RX ADMIN — MIDAZOLAM HYDROCHLORIDE 1 MG: 1 INJECTION, SOLUTION INTRAMUSCULAR; INTRAVENOUS at 11:03

## 2017-04-17 RX ADMIN — MORPHINE SULFATE 3 MG: 4 INJECTION INTRAVENOUS at 09:07

## 2017-04-17 RX ADMIN — MORPHINE SULFATE 3 MG: 4 INJECTION INTRAVENOUS at 21:58

## 2017-04-17 RX ADMIN — METOPROLOL TARTRATE 100 MG: 100 TABLET, FILM COATED ORAL at 21:57

## 2017-04-17 RX ADMIN — ENALAPRIL MALEATE 10 MG: 10 TABLET ORAL at 08:52

## 2017-04-17 RX ADMIN — HEPARIN SODIUM 5000 UNITS: 1000 INJECTION, SOLUTION INTRAVENOUS; SUBCUTANEOUS at 10:56

## 2017-04-17 RX ADMIN — FLUTICASONE PROPIONATE 100 MCG: 50 SPRAY, METERED NASAL at 21:57

## 2017-04-17 RX ADMIN — MORPHINE SULFATE 2 MG: 4 INJECTION INTRAVENOUS at 05:29

## 2017-04-17 RX ADMIN — POTASSIUM CHLORIDE 20 MEQ: 1500 TABLET, EXTENDED RELEASE ORAL at 21:57

## 2017-04-17 RX ADMIN — MORPHINE SULFATE 2 MG: 4 INJECTION INTRAVENOUS at 04:08

## 2017-04-17 RX ADMIN — HYDRALAZINE HYDROCHLORIDE 10 MG: 20 INJECTION INTRAMUSCULAR; INTRAVENOUS at 02:59

## 2017-04-17 RX ADMIN — SODIUM CHLORIDE, SODIUM LACTATE, POTASSIUM CHLORIDE, CALCIUM CHLORIDE AND DEXTROSE MONOHYDRATE: 5; 600; 310; 30; 20 INJECTION, SOLUTION INTRAVENOUS at 21:58

## 2017-04-17 RX ADMIN — METOPROLOL TARTRATE 100 MG: 100 TABLET, FILM COATED ORAL at 08:52

## 2017-04-17 RX ADMIN — MORPHINE SULFATE 3 MG: 4 INJECTION INTRAVENOUS at 14:52

## 2017-04-17 RX ADMIN — MIDAZOLAM HYDROCHLORIDE 1 MG: 1 INJECTION, SOLUTION INTRAMUSCULAR; INTRAVENOUS at 11:11

## 2017-04-17 RX ADMIN — ONDANSETRON 4 MG: 2 INJECTION INTRAMUSCULAR; INTRAVENOUS at 16:36

## 2017-04-17 RX ADMIN — ENALAPRIL MALEATE 10 MG: 10 TABLET ORAL at 21:57

## 2017-04-17 RX ADMIN — POTASSIUM CHLORIDE 20 MEQ: 1500 TABLET, EXTENDED RELEASE ORAL at 14:46

## 2017-04-17 RX ADMIN — ACETAMINOPHEN 1000 MG: 500 TABLET, FILM COATED ORAL at 05:09

## 2017-04-17 ASSESSMENT — PAIN SCALES - GENERAL
PAINLEVEL_OUTOF10: 8
PAINLEVEL_OUTOF10: 0
PAINLEVEL_OUTOF10: 6
PAINLEVEL_OUTOF10: 8

## 2017-04-17 NOTE — PROGRESS NOTES
Epidural removed by RN. Pt tolerated well.   Site CDI, no bleeding noted. Catheter tip intact.   Pt medicated for pain before and after.    Pt VSS but very diaphoretic and c/o SOB.   Currently on 5L O2 via face mask. SpO2 97%     Mouth swabs provided for comfort r/t complaints ofdry mouth from NPO status

## 2017-04-17 NOTE — DIETARY
Nutrition Services: Received consult for heart healthy diet education. Dietetic Intern attempted to provide education, however pt is Sami speaking only and no family was at bedside to translate. Left Sami education handout at bedside and notified RN.     LIZETH to follow up tomorrow for diet education.

## 2017-04-17 NOTE — OR SURGEON
Immediate Post-Operative Note      PreOp Diagnosis: Left hip claudication    PostOp Diagnosis: Same    Procedure(s): Aortogram  Left external iliac angiogram, angioplasty and self expanding stent  (8mm x 100mm....the desired 9mm stent was not available)    Surgeon(s):  ZAYDA Orlando M.D.    Anesthesiologist/Type of Anesthesia:Conscious sedation    Estimated Blood Loss: min    Findings:  of left external iliac artery    Complications: none    [id number]    4/17/2017 1:10 PM Charissa Kerr

## 2017-04-17 NOTE — DIETARY
"Nutrition Services: Inadequate Nutrition Intake. This is a 67 y.o male with admit dx: Aortic aneurysm without rupture (CMS-HCC), Aortic aneurysm without rupture (CMS-HCC)  Ht: 5'11\"  Wt: 75.3 kg  BMI: 23.16 kg/m2  Day 6 of admit. The patient is POD#5 s/p repair of AAA with horse shoe kidney per MD note. The patient remains strict NPO  Lasb: K: 2.8 (L)  Meds: Pepcid, K-Dur  IVF: D5LR infusion at 83 ml/hr   GI: last BM on 4/15 per chart   Skin: no skin breakdown is noted, a surgical wound is noted per chart  Plan/Recommendations: Advance the diet when medically feasible, fluids per MD, replete electrolytes prn  RD monitoring   "

## 2017-04-17 NOTE — PROGRESS NOTES
AA&O x 4.  Paraguayan speaking, family at bedside.  Ambulates with SBA.  Medicated for 5/10 abdominal pain with 3 mg morphine.  Apresoline given for 0800 BP of 148/80.  NPO since 0000.  SpO2: 98% on 3 L oxy mask.  Dr. Ruiz notified of 2.8 K - orders for po supplement.  Report given to Michelle in IR, transported off unit at 0942.

## 2017-04-17 NOTE — PROGRESS NOTES
Called regarding increased pain and fluid pocket near insertion of epidural. Orders given to remove epidural and initiate morphine 2mg IV Q5min max 3 doses/2hrs.

## 2017-04-17 NOTE — PROGRESS NOTES
RN having difficulty finding a way to enter Morphine as ordered in Epic. Contacted pharmacy to assistance but pharmacist had difficulty in placing the order the way it was given as well. Jett in OR paged again to have Dr. Lawson call back with a possible more standard dose of morphine that can be properly entered into epic. Requested 2-4 mg Q 2 hours

## 2017-04-17 NOTE — PROGRESS NOTES
Received report from Michelle in IR.  Back on floor at 1200.  AA&O x 4.  Bedrest laying flat for 2 hours.  Spoke with Dr. Kerr regarding removing right IJ central line - per Dr. Kerr keep in place until patient is consuming adequate po fluids this afternoon then ok to place PIV and remove.    Left groin gauze/tegaderm dressing with scant bloody drainage.

## 2017-04-17 NOTE — PROGRESS NOTES
When getting pt up from chair back to bed, a boggy area was noticed by RN distal to epidural insertion site. Area looks like fluid filled pocket above buttock on right side with mild bruseing noted. On Call anesthesiologist paged in regards to pt's increased pain, WOB, fever, and chills and new finding of boggy area for concerns of possible epidural leak or displacement. Epidural placed on hold while waiting on orders.     Call back received from Dr. Houston   Orders received to stop epidural then RN to pull epidural catheter.   Morphine 2 mg IV Q 5 min PRN max 3 doses (6 mg) Q 2 hrs.

## 2017-04-17 NOTE — PROGRESS NOTES
Pt A&O x 4. Hungarian speaking only. Up to chair at this time. No distress noted. Family at bedside.    Epidural in place. Running 5 ml/hr. Denies any pain right now. Site CDI. IVF running. VSS. 3L O2 NC in use. Midline incision with staples LANA/CDI. Up x1 ast to BR. +BM this shift, +void, +BS. Tolerating Clear liquid diet. No nausea/vomiting. Discussed POC. All needs met at this time. Hourly rounding in place.

## 2017-04-17 NOTE — PROGRESS NOTES
IR Progress Note:     Abdominal aortagram with left external iliac artery angioplasty and stent by Dr. Kerr. Pt tolerated procedure well and remained hemodynamically stable throughout. Report called to LITZY Bearden. Pt transported to T4.    Absolute Pro Vascular Self-expanding Stent System, 6F x 2.13mm, REF # [id number], LOT # [id number]    [employer] Angio-Seal VIP Vascular Closure Device, 6 F x 2.00mm, REF #[id number], LOT #[id number]

## 2017-04-17 NOTE — PROGRESS NOTES
Pt c/o increasing abdominal pain and dizziness and states he feels very cold.   Pt feels warm to the touch and is covered with multiple blankets.   Vitals check shows low grade fever 99.1 F.

## 2017-04-18 LAB
ANION GAP SERPL CALC-SCNC: 8 MMOL/L (ref 0–11.9)
BUN SERPL-MCNC: 9 MG/DL (ref 8–22)
CALCIUM SERPL-MCNC: 7.6 MG/DL (ref 8.5–10.5)
CHLORIDE SERPL-SCNC: 101 MMOL/L (ref 96–112)
CO2 SERPL-SCNC: 28 MMOL/L (ref 20–33)
CREAT SERPL-MCNC: 1.03 MG/DL (ref 0.5–1.4)
GFR SERPL CREATININE-BSD FRML MDRD: >60 ML/MIN/1.73 M 2
GLUCOSE BLD-MCNC: 130 MG/DL (ref 65–99)
GLUCOSE SERPL-MCNC: 146 MG/DL (ref 65–99)
POTASSIUM SERPL-SCNC: 3.1 MMOL/L (ref 3.6–5.5)
SODIUM SERPL-SCNC: 137 MMOL/L (ref 135–145)

## 2017-04-18 PROCEDURE — A9270 NON-COVERED ITEM OR SERVICE: HCPCS | Performed by: SURGERY

## 2017-04-18 PROCEDURE — 94760 N-INVAS EAR/PLS OXIMETRY 1: CPT

## 2017-04-18 PROCEDURE — 80048 BASIC METABOLIC PNL TOTAL CA: CPT

## 2017-04-18 PROCEDURE — 700102 HCHG RX REV CODE 250 W/ 637 OVERRIDE(OP): Performed by: SURGERY

## 2017-04-18 PROCEDURE — 770001 HCHG ROOM/CARE - MED/SURG/GYN PRIV*

## 2017-04-18 PROCEDURE — 700111 HCHG RX REV CODE 636 W/ 250 OVERRIDE (IP): Performed by: SURGERY

## 2017-04-18 PROCEDURE — 700111 HCHG RX REV CODE 636 W/ 250 OVERRIDE (IP): Performed by: ANESTHESIOLOGY

## 2017-04-18 PROCEDURE — 90662 IIV NO PRSV INCREASED AG IM: CPT | Performed by: SURGERY

## 2017-04-18 PROCEDURE — 90471 IMMUNIZATION ADMIN: CPT

## 2017-04-18 PROCEDURE — 82962 GLUCOSE BLOOD TEST: CPT

## 2017-04-18 PROCEDURE — 3E0234Z INTRODUCTION OF SERUM, TOXOID AND VACCINE INTO MUSCLE, PERCUTANEOUS APPROACH: ICD-10-PCS | Performed by: SURGERY

## 2017-04-18 RX ORDER — BISACODYL 10 MG
10 SUPPOSITORY, RECTAL RECTAL
Status: DISCONTINUED | OUTPATIENT
Start: 2017-04-18 | End: 2017-04-21 | Stop reason: HOSPADM

## 2017-04-18 RX ADMIN — METOPROLOL TARTRATE 100 MG: 100 TABLET, FILM COATED ORAL at 08:01

## 2017-04-18 RX ADMIN — NIFEDIPINE 30 MG: 30 TABLET, FILM COATED, EXTENDED RELEASE ORAL at 21:00

## 2017-04-18 RX ADMIN — MORPHINE SULFATE 3 MG: 4 INJECTION INTRAVENOUS at 13:07

## 2017-04-18 RX ADMIN — MORPHINE SULFATE 3 MG: 4 INJECTION INTRAVENOUS at 00:36

## 2017-04-18 RX ADMIN — INFLUENZA A VIRUS A/CALIFORNIA/7/2009 X-179A (H1N1) ANTIGEN (FORMALDEHYDE INACTIVATED), INFLUENZA A VIRUS A/HONG KONG/4801/2014 X-263B (H3N2) ANTIGEN (FORMALDEHYDE INACTIVATED), AND INFLUENZA B VIRUS B/BRISBANE/60/2008 ANTIGEN (FORMALDEHYDE INACTIVATED) 0.5 ML: 60; 60; 60 INJECTION, SUSPENSION INTRAMUSCULAR at 17:20

## 2017-04-18 RX ADMIN — MORPHINE SULFATE 3 MG: 4 INJECTION INTRAVENOUS at 17:40

## 2017-04-18 RX ADMIN — ONDANSETRON 4 MG: 2 INJECTION INTRAMUSCULAR; INTRAVENOUS at 20:54

## 2017-04-18 RX ADMIN — FLUTICASONE PROPIONATE 100 MCG: 50 SPRAY, METERED NASAL at 21:00

## 2017-04-18 RX ADMIN — ENALAPRIL MALEATE 10 MG: 10 TABLET ORAL at 21:00

## 2017-04-18 RX ADMIN — ONDANSETRON 4 MG: 2 INJECTION INTRAMUSCULAR; INTRAVENOUS at 07:13

## 2017-04-18 RX ADMIN — MORPHINE SULFATE 3 MG: 4 INJECTION INTRAVENOUS at 03:34

## 2017-04-18 RX ADMIN — POTASSIUM CHLORIDE 20 MEQ: 1500 TABLET, EXTENDED RELEASE ORAL at 08:01

## 2017-04-18 RX ADMIN — GUAIFENESIN 600 MG: 600 TABLET, EXTENDED RELEASE ORAL at 08:01

## 2017-04-18 RX ADMIN — POTASSIUM CHLORIDE 20 MEQ: 1500 TABLET, EXTENDED RELEASE ORAL at 21:00

## 2017-04-18 RX ADMIN — MORPHINE SULFATE 3 MG: 4 INJECTION INTRAVENOUS at 20:54

## 2017-04-18 RX ADMIN — MORPHINE SULFATE 3 MG: 4 INJECTION INTRAVENOUS at 08:06

## 2017-04-18 RX ADMIN — CLOPIDOGREL BISULFATE 75 MG: 75 TABLET, FILM COATED ORAL at 08:01

## 2017-04-18 RX ADMIN — FLUTICASONE PROPIONATE 100 MCG: 50 SPRAY, METERED NASAL at 08:02

## 2017-04-18 RX ADMIN — ONDANSETRON 4 MG: 2 INJECTION INTRAMUSCULAR; INTRAVENOUS at 13:15

## 2017-04-18 RX ADMIN — BISACODYL 10 MG: 10 SUPPOSITORY RECTAL at 13:09

## 2017-04-18 RX ADMIN — GUAIFENESIN 600 MG: 600 TABLET, EXTENDED RELEASE ORAL at 21:00

## 2017-04-18 RX ADMIN — ENALAPRIL MALEATE 10 MG: 10 TABLET ORAL at 08:01

## 2017-04-18 RX ADMIN — FAMOTIDINE 20 MG: 20 TABLET, FILM COATED ORAL at 08:01

## 2017-04-18 RX ADMIN — METOPROLOL TARTRATE 100 MG: 100 TABLET, FILM COATED ORAL at 21:00

## 2017-04-18 ASSESSMENT — ENCOUNTER SYMPTOMS
RESPIRATORY NEGATIVE: 1
NAUSEA: 0
VOMITING: 0
MUSCULOSKELETAL NEGATIVE: 1
DIARRHEA: 0

## 2017-04-18 ASSESSMENT — PAIN SCALES - GENERAL
PAINLEVEL_OUTOF10: 2
PAINLEVEL_OUTOF10: 7
PAINLEVEL_OUTOF10: 5
PAINLEVEL_OUTOF10: 4
PAINLEVEL_OUTOF10: 7

## 2017-04-18 ASSESSMENT — LIFESTYLE VARIABLES: DO YOU DRINK ALCOHOL: NO

## 2017-04-18 NOTE — PROGRESS NOTES
"Report received from NOC shift RN. Patient is Slovenian speaking. A/O X 4 and on 2L O2 by Oxy mask. Patient reports it is more comfortable than cannula. VSS. /81 mmHg  Pulse 88  Temp(Src) 36.7 °C (98 °F)  Resp 17  Ht 1.803 m (5' 11\")  Wt 75.3 kg (166 lb 0.1 oz)  BMI 23.16 kg/m2  SpO2 96% Labs reviewed. K+ at 3.1 from 2.8; Ca at 7.6 from 7.8. Large midline abdomen incision present, approximated by staples and LANA. No S&S of infection or drainage present. Smaller incision present on left groin, dressed with [id number] and tegaderm. Scant drainage present. Patient reports fatigue d/t lack of sleep from previous evening, will cluster care today. Pain level of 4/10, administered 3mg of IV morphine for pain and to induce sleep. Left FA PIV in place, flushed and SL. Right IJ in place with orders to remove when tolerating PO. Patient has continued to report nausea and line has been left in place. BS hypoactive X4. -BM since 4/15/17. +void, frequently. Patient has been educated on mobility, will ambulate today. Requires one assist at this time to get up from bed/chair. Call light and family at bedside.     "

## 2017-04-18 NOTE — OP REPORT
DATE OF SERVICE:  04/17/2017    PREOPERATIVE DIAGNOSES:  Left hip claudication with recent symptomatic   abdominal aortic aneurysm repair.    POSTPROCEDURE DIAGNOSES:  Left hip claudication with recent symptomatic   abdominal aortic aneurysm repair.    PROCEDURES PERFORMED:  1.  Aortic angiogram.  2.  Left iliac artery angiogram.  3.  Left external iliac artery angioplasty and stent.    SURGEON:  Charissa Kerr MD    ANESTHESIA:  Conscious sedation.    INDICATIONS:  The patient is a 67-year-old gentleman who presented to the   hospital last week with a symptomatic aneurysm.  He traveled from [place] to   have an aneurysm repair after being told his mortality from the repair was   over 50%.  A complicated open repair was performed, understanding a history of   left hip claudication that had been ongoing for at least a year or 2 prior to   this repair.  A CT angiogram prior to his repair did not show any obvious   problems, but did not image the external iliac artery.  Noninvasive studies   were limited by bowel gas.  He is brought to the angio suite to identify and   treat areas of symptomatic stenosis.  I have my concerns that the limb of the   graft is infected, however, I believe he would have rest pain if this was the   case.  Risks and benefits were explained carefully to the patient and include   bleeding, infection, stent thrombosis, and access complications.  He   understands and agrees to proceed.    DESCRIPTION OF PROCEDURE:  Patient was taken to the angio suite and placed in   a supine position.  After adequate sedation, both groins were prepped and   draped in the usual sterile fashion with Chloraseptic prep, cloth towels and   paper drapes.  Access to the left femoral artery was obtained after   administration of local anesthesia and a single puncture technique under   ultrasound guidance was performed.  The wire only went so far and over the   wire, a 4-[place] sheath was placed.  An angiogram through  the 4-Citizen of [place] sheath   showed occlusion of the majority of the long external iliac artery.  Through   the 4-Citizen of [place] sheath, I placed a Glidewire and was able to negotiate the area   of chronic total occlusion.  I placed the glide catheter into the common iliac   artery and performed an angiogram.  The 4-Citizen of [place] sheath was exchanged for a   6-Citizen [place] sheath.    The patient was given 5000 units of heparin and this was allowed to circulate.    Using a 5x100 mm balloon, balloon angioplasty was performed allowing flow   through the previously occluded vessel.  The 6-Citizen of [place] sheath was placed with   the dilator into the distal common iliac artery.  I then deployed an 8x100 mm   self-expanding stent, purposefully placing the proximal aspect of the stent at   the iliac bifurcation.  I carefully avoided covering the circumflex iliac   vessels.  This was postdilated with a 7x100 mm balloon dilating both ends of   the stent.  I then placed the sheath back up into the external iliac artery   and an Omni flush catheter was placed in the body of the graft.  Power   injection was used to perform an aortogram, which demonstrated flow into the   left side of the horseshoe kidney, but no obvious flow from a smaller right   accessory renal artery.  I did not pursue further imaging of this area and   demonstrated normal flow through the left common iliac and external iliac   artery, recently treated.    A 6 mm Angio-Seal was deployed with good results and complete hemostasis.    There were no apparent complications.  The patient tolerated the procedure   well.       ____________________________________     MD BETO Hernández / SAMUEL    DD:  04/17/2017 13:16:24  DT:  04/17/2017 17:26:45    D#:  [id number]  Job#:  [id number]

## 2017-04-18 NOTE — PROGRESS NOTES
Pt is s/p Abdominal aortagram, L external iliac artery angioplasty & stent placement.   Pt A&O x4. Lithuanian speaking. Up to recliner chair. Wife at bedside.   2 L o2 via oxy face mask per pt request for comfort. No distress noted  Up x1 ast to BR, Uses urinal to void.   Pain controlled with IV morphine PRN. Medicating per MAR.   Midline incision with staples LANA/CDI.  Tolerated full liquid diet. Denies nausea/vomiting.  P.O K+ added for low K+ levels with AM labs.   Bowel sounds normoactive. Last BM 4/16  All needs met at this time. Hourly rounding in place.

## 2017-04-18 NOTE — PROGRESS NOTES
Surgical Progress Note    Author: Jersey Ruiz Date & Time created: 2017   9:53 AM     Interval Events:  PO 7 AAA repair.  PO 1  1.  Aortic angiogram.   2.  Left iliac artery angiogram.   3.  Left external iliac artery angioplasty and stent.   Review of Systems   Respiratory: Negative.    Gastrointestinal: Negative for nausea, vomiting and diarrhea.        Colic with eating.  No flatus.   Musculoskeletal: Negative.      Hemodynamics:  Temp (24hrs), Av.7 °C (98 °F), Min:36.4 °C (97.5 °F), Max:36.9 °C (98.4 °F)  Temperature: 36.9 °C (98.4 °F)  Pulse  Av.5  Min: 47  Max: 113Heart Rate (Monitored): 86  Blood Pressure : 135/81 mmHg, NIBP: (!) 192/100 mmHg    Respiratory:    Respiration: 16, Pulse Oximetry: 94 %, O2 Daily Delivery Respiratory : OxyMask     Work Of Breathing / Effort: Mild  RUL Breath Sounds: Clear, RML Breath Sounds: Clear, RLL Breath Sounds: Diminished, NORMA Breath Sounds: Clear, LLL Breath Sounds: Diminished  Neuro:  GCS       Fluids:    Intake/Output Summary (Last 24 hours) at 17 0953  Last data filed at 17 0600   Gross per 24 hour   Intake   1224 ml   Output   2615 ml   Net  -1391 ml        Current Diet Order   Procedures   • DIET ORDER     Physical Exam  Labs:  Recent Results (from the past 24 hour(s))   BASIC METABOLIC PANEL    Collection Time: 17  5:05 AM   Result Value Ref Range    Sodium 137 135 - 145 mmol/L    Potassium 3.1 (L) 3.6 - 5.5 mmol/L    Chloride 101 96 - 112 mmol/L    Co2 28 20 - 33 mmol/L    Glucose 146 (H) 65 - 99 mg/dL    Bun 9 8 - 22 mg/dL    Creatinine 1.03 0.50 - 1.40 mg/dL    Calcium 7.6 (L) 8.5 - 10.5 mg/dL    Anion Gap 8.0 0.0 - 11.9   ESTIMATED GFR    Collection Time: 17  5:05 AM   Result Value Ref Range    GFR If African American >60 >60 mL/min/1.73 m 2    GFR If Non African American >60 >60 mL/min/1.73 m 2     Medical Decision Making, by Problem:  Active Hospital Problems    Diagnosis   • Aortic aneurysm without rupture (CMS-HCC)  [I71.9]     Plan:  Discussed in detail with Fadi MCGHEE interpreting.  He is going to try another Dulcolax to see if can resolve colic.  More ambulation today.  Diet as tolerated.  Continues on supplemental oxygen.  Creatinine normal.  When eating well anticipate discharge.    Quality Measures:  Core Measures    Discussed patient condition with Family, RN and Patient

## 2017-04-18 NOTE — PROGRESS NOTES
RN did not remove IJ during the night. Pt not taking adequate p.o intake and c/o nausea this morning. States it has kept him up since 3 am

## 2017-04-19 LAB
GLUCOSE BLD-MCNC: 124 MG/DL (ref 65–99)
GLUCOSE BLD-MCNC: 150 MG/DL (ref 65–99)

## 2017-04-19 PROCEDURE — A9270 NON-COVERED ITEM OR SERVICE: HCPCS | Performed by: SURGERY

## 2017-04-19 PROCEDURE — 700102 HCHG RX REV CODE 250 W/ 637 OVERRIDE(OP): Performed by: SURGERY

## 2017-04-19 PROCEDURE — 700111 HCHG RX REV CODE 636 W/ 250 OVERRIDE (IP): Performed by: ANESTHESIOLOGY

## 2017-04-19 PROCEDURE — 770001 HCHG ROOM/CARE - MED/SURG/GYN PRIV*

## 2017-04-19 PROCEDURE — 82962 GLUCOSE BLOOD TEST: CPT | Mod: 91

## 2017-04-19 PROCEDURE — 700111 HCHG RX REV CODE 636 W/ 250 OVERRIDE (IP): Performed by: SURGERY

## 2017-04-19 RX ADMIN — GUAIFENESIN 600 MG: 600 TABLET, EXTENDED RELEASE ORAL at 21:04

## 2017-04-19 RX ADMIN — MORPHINE SULFATE 3 MG: 4 INJECTION INTRAVENOUS at 09:33

## 2017-04-19 RX ADMIN — MORPHINE SULFATE 3 MG: 4 INJECTION INTRAVENOUS at 21:05

## 2017-04-19 RX ADMIN — MORPHINE SULFATE 3 MG: 4 INJECTION INTRAVENOUS at 13:44

## 2017-04-19 RX ADMIN — NIFEDIPINE 30 MG: 30 TABLET, FILM COATED, EXTENDED RELEASE ORAL at 22:22

## 2017-04-19 RX ADMIN — POTASSIUM CHLORIDE 20 MEQ: 1500 TABLET, EXTENDED RELEASE ORAL at 21:04

## 2017-04-19 RX ADMIN — CLOPIDOGREL BISULFATE 75 MG: 75 TABLET, FILM COATED ORAL at 09:16

## 2017-04-19 RX ADMIN — FLUTICASONE PROPIONATE 100 MCG: 50 SPRAY, METERED NASAL at 09:16

## 2017-04-19 RX ADMIN — MORPHINE SULFATE 3 MG: 4 INJECTION INTRAVENOUS at 02:08

## 2017-04-19 RX ADMIN — METOPROLOL TARTRATE 100 MG: 100 TABLET, FILM COATED ORAL at 21:04

## 2017-04-19 RX ADMIN — ENALAPRIL MALEATE 10 MG: 10 TABLET ORAL at 21:04

## 2017-04-19 RX ADMIN — MORPHINE SULFATE 3 MG: 4 INJECTION INTRAVENOUS at 17:42

## 2017-04-19 RX ADMIN — MORPHINE SULFATE 3 MG: 4 INJECTION INTRAVENOUS at 06:13

## 2017-04-19 RX ADMIN — FLUTICASONE PROPIONATE 100 MCG: 50 SPRAY, METERED NASAL at 21:06

## 2017-04-19 RX ADMIN — ONDANSETRON 4 MG: 2 INJECTION INTRAMUSCULAR; INTRAVENOUS at 09:33

## 2017-04-19 RX ADMIN — ENALAPRIL MALEATE 10 MG: 10 TABLET ORAL at 09:16

## 2017-04-19 RX ADMIN — FAMOTIDINE 20 MG: 20 TABLET, FILM COATED ORAL at 09:16

## 2017-04-19 RX ADMIN — ONDANSETRON 4 MG: 2 INJECTION INTRAMUSCULAR; INTRAVENOUS at 13:44

## 2017-04-19 RX ADMIN — GUAIFENESIN 600 MG: 600 TABLET, EXTENDED RELEASE ORAL at 09:16

## 2017-04-19 RX ADMIN — SODIUM CHLORIDE, SODIUM LACTATE, POTASSIUM CHLORIDE, CALCIUM CHLORIDE AND DEXTROSE MONOHYDRATE: 5; 600; 310; 30; 20 INJECTION, SOLUTION INTRAVENOUS at 02:29

## 2017-04-19 RX ADMIN — METOPROLOL TARTRATE 100 MG: 100 TABLET, FILM COATED ORAL at 09:16

## 2017-04-19 RX ADMIN — ONDANSETRON 4 MG: 2 INJECTION INTRAMUSCULAR; INTRAVENOUS at 17:47

## 2017-04-19 RX ADMIN — POTASSIUM CHLORIDE 20 MEQ: 1500 TABLET, EXTENDED RELEASE ORAL at 09:16

## 2017-04-19 RX ADMIN — MORPHINE SULFATE 3 MG: 4 INJECTION INTRAVENOUS at 23:46

## 2017-04-19 ASSESSMENT — PAIN SCALES - GENERAL
PAINLEVEL_OUTOF10: 1
PAINLEVEL_OUTOF10: 4
PAINLEVEL_OUTOF10: 8
PAINLEVEL_OUTOF10: 2
PAINLEVEL_OUTOF10: 4
PAINLEVEL_OUTOF10: 8
PAINLEVEL_OUTOF10: 4
PAINLEVEL_OUTOF10: 8
PAINLEVEL_OUTOF10: 8
PAINLEVEL_OUTOF10: 4
PAINLEVEL_OUTOF10: 8
PAINLEVEL_OUTOF10: 8

## 2017-04-19 ASSESSMENT — ENCOUNTER SYMPTOMS
PALPITATIONS: 0
DIARRHEA: 0
HEARTBURN: 0
MYALGIAS: 0
FEVER: 0
VOMITING: 0
NAUSEA: 1
COUGH: 0
ABDOMINAL PAIN: 1
SHORTNESS OF BREATH: 0
CHILLS: 0

## 2017-04-19 ASSESSMENT — LIFESTYLE VARIABLES: DO YOU DRINK ALCOHOL: NO

## 2017-04-19 NOTE — PROGRESS NOTES
Patient c/o of nausea, zofran administered, morphine administered for pain, mood pleasant, family at bedside, patient ambulated around unit.

## 2017-04-19 NOTE — PROGRESS NOTES
"Pt states that in his left eye for the past 3 weeks he sees a shadow that looks like a rat every time he looks at something. When RN asked the pt if he had mentioned it to the dr pt stated \"no\". When asked why not pt stated he had bigger problems going on at the time. Pt requesting MD to be notified. Family at bedside translating.   "

## 2017-04-19 NOTE — PROGRESS NOTES
Surgical Progress Note    Author: Charissa Kerr Date & Time created: 2017   8:37 AM     Interval Events:  POD#8 s/p repair of symptomatic AAA.  Still has a little nausea.  Small BM after 2 suppositories.  Still liquid diet. No claudication or hip pain.   Review of Systems   Constitutional: Negative for fever and chills.   Respiratory: Negative for cough and shortness of breath.    Cardiovascular: Negative for chest pain, palpitations and leg swelling.   Gastrointestinal: Positive for nausea and abdominal pain. Negative for heartburn, vomiting and diarrhea.   Genitourinary: Negative for dysuria.   Musculoskeletal: Negative for myalgias.   Skin: Negative for rash.     Hemodynamics:  Temp (24hrs), Av.9 °C (98.5 °F), Min:36.6 °C (97.9 °F), Max:37.2 °C (99 °F)  Temperature: 36.6 °C (97.9 °F)  Pulse  Av.3  Min: 47  Max: 113   Blood Pressure : 149/88 mmHg    Respiratory:    Respiration: 18, Pulse Oximetry: 98 %, O2 Daily Delivery Respiratory : OxyMask     Work Of Breathing / Effort: Mild  RUL Breath Sounds: Clear, RML Breath Sounds: Clear, RLL Breath Sounds: Diminished, NORMA Breath Sounds: Clear, LLL Breath Sounds: Diminished  Neuro:  GCS       Fluids:    Intake/Output Summary (Last 24 hours) at 17 0837  Last data filed at 17 0459   Gross per 24 hour   Intake   1960 ml   Output   1715 ml   Net    245 ml        Current Diet Order   Procedures   • DIET ORDER     Physical Exam   Constitutional: He is oriented to person, place, and time.   Neck: Neck supple.   Cardiovascular: Normal rate, regular rhythm, normal heart sounds and intact distal pulses.    Pulmonary/Chest: Effort normal and breath sounds normal.   Abdominal: Soft.   Incision looks great.  Staples intact.   Genitourinary: Penis normal.   Musculoskeletal: Normal range of motion. He exhibits no edema.   Neurological: He is alert and oriented to person, place, and time.   Skin: Skin is warm.     Labs:  Recent Results (from the past 24  hour(s))   ACCU-CHEK GLUCOSE    Collection Time: 04/18/17 11:11 PM   Result Value Ref Range    Glucose - Accu-Ck 130 (H) 65 - 99 mg/dL   ACCU-CHEK GLUCOSE    Collection Time: 04/19/17  6:14 AM   Result Value Ref Range    Glucose - Accu-Ck 124 (H) 65 - 99 mg/dL     Medical Decision Making, by Problem:  Active Hospital Problems    Diagnosis   • Aortic aneurysm without rupture (CMS-HCC) [I71.9]     Plan:  Consider discharge tomorrow.  Check K+ and advance diet.  Hep lock IV and remove central line.    Quality Measures:  Labs reviewed  Lawrence catheter: No Lawrence      DVT Prophylaxis: Enoxaparin (Lovenox)              Discussed patient condition with RN and Patient

## 2017-04-19 NOTE — CARE PLAN
Problem: Knowledge Deficit  Goal: Knowledge of disease process/condition, treatment plan, diagnostic tests, and medications will improve  POC: Pain and nausea management, nutrition consult    Problem: Discharge Barriers/Planning  Goal: Patient’s continuum of care needs will be met  Consult to  for DC planning to make sure all pt needs are met.

## 2017-04-19 NOTE — CARE PLAN
Problem: Nutritional:  Goal: Achieve adequate nutritional intake  Diet to advance and Patient will consume 50% of meals   Outcome: PROGRESSING AS EXPECTED  PO intake slowly improving (% per ADLs), but pt still on a full liquid diet.

## 2017-04-19 NOTE — DISCHARGE PLANNING
"Medical Social Work    Referral: Discharge planning     Intervention: Received IP Consult to  \"Discharge planning.  Due to language barriers, I would like to be sure the patient's needs are met.\" Met w/ pt and pt's wife at bedside with  phone. Pt expressed have a lot of support at home and did not mention needing anything post discharge. Pt requested this SW'er call pt's son to discuss this further.    Called pt's son Fadi to check on any discharge needs this SW'er can assist w/. Per Fadi, their family would just like to know what pt's diet and mobility restrictions will be post discharge. Explained to Fadi that this information prints out on pt's discharge paperwork and is explained by the bedside RN before leaving. He was also wondering if pt will need O2 for discharge. Requested bedside RN run pt's 02 saturations to see if pt will require this for discharge. Fadi stated that there is one step to get into the house, but that there will be family to help him assist getting into the house.     Plan: Should pt require 02 upon d/c, will obtain private pay amount as pt is \"pending [place] Medicaid\" and provide to family. Family identified no further d/c planning needs. SW to remain available.   "

## 2017-04-20 LAB
ANION GAP SERPL CALC-SCNC: 8 MMOL/L (ref 0–11.9)
BUN SERPL-MCNC: 9 MG/DL (ref 8–22)
CALCIUM SERPL-MCNC: 7.9 MG/DL (ref 8.5–10.5)
CHLORIDE SERPL-SCNC: 102 MMOL/L (ref 96–112)
CO2 SERPL-SCNC: 26 MMOL/L (ref 20–33)
CREAT SERPL-MCNC: 1.18 MG/DL (ref 0.5–1.4)
GFR SERPL CREATININE-BSD FRML MDRD: >60 ML/MIN/1.73 M 2
GLUCOSE BLD-MCNC: 226 MG/DL (ref 65–99)
GLUCOSE SERPL-MCNC: 126 MG/DL (ref 65–99)
POTASSIUM SERPL-SCNC: 3.5 MMOL/L (ref 3.6–5.5)
SODIUM SERPL-SCNC: 136 MMOL/L (ref 135–145)

## 2017-04-20 PROCEDURE — A9270 NON-COVERED ITEM OR SERVICE: HCPCS | Performed by: SURGERY

## 2017-04-20 PROCEDURE — 770001 HCHG ROOM/CARE - MED/SURG/GYN PRIV*

## 2017-04-20 PROCEDURE — 700102 HCHG RX REV CODE 250 W/ 637 OVERRIDE(OP): Performed by: SURGERY

## 2017-04-20 PROCEDURE — 36415 COLL VENOUS BLD VENIPUNCTURE: CPT

## 2017-04-20 PROCEDURE — 700111 HCHG RX REV CODE 636 W/ 250 OVERRIDE (IP): Performed by: ANESTHESIOLOGY

## 2017-04-20 PROCEDURE — 700111 HCHG RX REV CODE 636 W/ 250 OVERRIDE (IP): Performed by: SURGERY

## 2017-04-20 PROCEDURE — 80048 BASIC METABOLIC PNL TOTAL CA: CPT

## 2017-04-20 PROCEDURE — 82962 GLUCOSE BLOOD TEST: CPT

## 2017-04-20 RX ADMIN — POTASSIUM CHLORIDE 20 MEQ: 1500 TABLET, EXTENDED RELEASE ORAL at 20:39

## 2017-04-20 RX ADMIN — METOPROLOL TARTRATE 100 MG: 100 TABLET, FILM COATED ORAL at 20:39

## 2017-04-20 RX ADMIN — MORPHINE SULFATE 3 MG: 4 INJECTION INTRAVENOUS at 03:05

## 2017-04-20 RX ADMIN — BISACODYL 10 MG: 10 SUPPOSITORY RECTAL at 16:02

## 2017-04-20 RX ADMIN — ENALAPRIL MALEATE 10 MG: 10 TABLET ORAL at 07:54

## 2017-04-20 RX ADMIN — MORPHINE SULFATE 3 MG: 4 INJECTION INTRAVENOUS at 20:39

## 2017-04-20 RX ADMIN — MORPHINE SULFATE 3 MG: 4 INJECTION INTRAVENOUS at 06:48

## 2017-04-20 RX ADMIN — NIFEDIPINE 30 MG: 30 TABLET, FILM COATED, EXTENDED RELEASE ORAL at 20:40

## 2017-04-20 RX ADMIN — POTASSIUM CHLORIDE 20 MEQ: 1500 TABLET, EXTENDED RELEASE ORAL at 07:54

## 2017-04-20 RX ADMIN — GUAIFENESIN 600 MG: 600 TABLET, EXTENDED RELEASE ORAL at 07:54

## 2017-04-20 RX ADMIN — FLUTICASONE PROPIONATE 100 MCG: 50 SPRAY, METERED NASAL at 07:54

## 2017-04-20 RX ADMIN — ONDANSETRON 4 MG: 2 INJECTION INTRAMUSCULAR; INTRAVENOUS at 20:39

## 2017-04-20 RX ADMIN — METOPROLOL TARTRATE 100 MG: 100 TABLET, FILM COATED ORAL at 07:55

## 2017-04-20 RX ADMIN — GUAIFENESIN 600 MG: 600 TABLET, EXTENDED RELEASE ORAL at 20:39

## 2017-04-20 RX ADMIN — ENALAPRIL MALEATE 10 MG: 10 TABLET ORAL at 20:39

## 2017-04-20 RX ADMIN — ONDANSETRON 4 MG: 2 INJECTION INTRAMUSCULAR; INTRAVENOUS at 06:49

## 2017-04-20 RX ADMIN — MORPHINE SULFATE 3 MG: 4 INJECTION INTRAVENOUS at 10:06

## 2017-04-20 RX ADMIN — FAMOTIDINE 20 MG: 20 TABLET, FILM COATED ORAL at 07:54

## 2017-04-20 RX ADMIN — CLOPIDOGREL BISULFATE 75 MG: 75 TABLET, FILM COATED ORAL at 07:54

## 2017-04-20 RX ADMIN — FLUTICASONE PROPIONATE 100 MCG: 50 SPRAY, METERED NASAL at 20:40

## 2017-04-20 ASSESSMENT — PAIN SCALES - GENERAL
PAINLEVEL_OUTOF10: 8
PAINLEVEL_OUTOF10: 9
PAINLEVEL_OUTOF10: 0
PAINLEVEL_OUTOF10: 3
PAINLEVEL_OUTOF10: 8
PAINLEVEL_OUTOF10: 6
PAINLEVEL_OUTOF10: 6

## 2017-04-20 ASSESSMENT — ENCOUNTER SYMPTOMS
SHORTNESS OF BREATH: 0
COUGH: 0
ABDOMINAL PAIN: 1
CONSTIPATION: 1
NAUSEA: 1

## 2017-04-20 NOTE — PROGRESS NOTES
Surgical Progress Note    Author: Jersey Ruiz Date & Time created: 2017   11:03 AM     Interval Events:  POD#9 s/p repair of symptomatic AAA.   Does not feel well today.  States not ready to go home.   Potassium increased slightly on oral.  PCA should not be needed.  Will make oral pain medication available.      Review of Systems   Constitutional:        Feels hot and cold but no fever   Respiratory: Negative for cough and shortness of breath.    Gastrointestinal: Positive for nausea, abdominal pain and constipation.     Hemodynamics:  Temp (24hrs), Av.8 °C (98.2 °F), Min:36.7 °C (98 °F), Max:37 °C (98.6 °F)  Temperature: 36.7 °C (98.1 °F)  Pulse  Av.2  Min: 47  Max: 113   Blood Pressure : 136/74 mmHg    Respiratory:    Respiration: 17, Pulse Oximetry: 94 %     Work Of Breathing / Effort: Mild  RUL Breath Sounds: Clear, RML Breath Sounds: Clear, RLL Breath Sounds: Diminished, NORMA Breath Sounds: Clear, LLL Breath Sounds: Diminished  Neuro:  GCS       Fluids:    Intake/Output Summary (Last 24 hours) at 17 1103  Last data filed at 17 0400   Gross per 24 hour   Intake    480 ml   Output      0 ml   Net    480 ml        Current Diet Order   Procedures   • DIET ORDER     Physical Exam  Labs:  Recent Results (from the past 24 hour(s))   ACCU-CHEK GLUCOSE    Collection Time: 17 11:45 AM   Result Value Ref Range    Glucose - Accu-Ck 150 (H) 65 - 99 mg/dL   BASIC METABOLIC PANEL    Collection Time: 17  3:52 AM   Result Value Ref Range    Sodium 136 135 - 145 mmol/L    Potassium 3.5 (L) 3.6 - 5.5 mmol/L    Chloride 102 96 - 112 mmol/L    Co2 26 20 - 33 mmol/L    Glucose 126 (H) 65 - 99 mg/dL    Bun 9 8 - 22 mg/dL    Creatinine 1.18 0.50 - 1.40 mg/dL    Calcium 7.9 (L) 8.5 - 10.5 mg/dL    Anion Gap 8.0 0.0 - 11.9   ESTIMATED GFR    Collection Time: 17  3:52 AM   Result Value Ref Range    GFR If African American >60 >60 mL/min/1.73 m 2    GFR If Non  >60 >60  mL/min/1.73 m 2   ACCU-CHEK GLUCOSE    Collection Time: 04/20/17  6:51 AM   Result Value Ref Range    Glucose - Accu-Ck 226 (H) 65 - 99 mg/dL     Medical Decision Making, by Problem:  Active Hospital Problems    Diagnosis   • Aortic aneurysm without rupture (CMS-HCC) [I71.9]     Plan:  Try dulcolax suppository.  Increase walking. DC morphine.  Observe    Quality Measures:  Core Measures    Discussed patient condition with RN and Patient

## 2017-04-20 NOTE — PROGRESS NOTES
Patient ambulated to bathroom without assistance, mood pleasant, patient tolerating PO intake, no nausea noted, c/o of abdominal pain at incision site, morphine administered, patient still requiring 1L O2 for comfort.

## 2017-04-20 NOTE — PROGRESS NOTES
Received report from off going RN.  Assumed patient care and introduced self to patient and pt wife. Patient AOx4, seated in chair, midline abdominal incision with staples, LANA. No complaints of pain/discomfort at this time. Bed in lowest position, bed locked, treaded socks in place, call light within reach. Will continue to monitor.

## 2017-04-20 NOTE — PROGRESS NOTES
Voicemail left with MD Ruiz office regarding new PO pain med order (spoke with MD about this, was mentioned in note, however no new med available at this time)

## 2017-04-21 VITALS
WEIGHT: 166.01 LBS | OXYGEN SATURATION: 97 % | BODY MASS INDEX: 23.24 KG/M2 | TEMPERATURE: 97.7 F | HEART RATE: 71 BPM | DIASTOLIC BLOOD PRESSURE: 76 MMHG | SYSTOLIC BLOOD PRESSURE: 140 MMHG | HEIGHT: 71 IN | RESPIRATION RATE: 17 BRPM

## 2017-04-21 PROCEDURE — A9270 NON-COVERED ITEM OR SERVICE: HCPCS | Performed by: SURGERY

## 2017-04-21 PROCEDURE — 700111 HCHG RX REV CODE 636 W/ 250 OVERRIDE (IP): Performed by: SURGERY

## 2017-04-21 PROCEDURE — 700101 HCHG RX REV CODE 250: Performed by: SURGERY

## 2017-04-21 PROCEDURE — 700102 HCHG RX REV CODE 250 W/ 637 OVERRIDE(OP): Performed by: SURGERY

## 2017-04-21 PROCEDURE — 700111 HCHG RX REV CODE 636 W/ 250 OVERRIDE (IP): Performed by: ANESTHESIOLOGY

## 2017-04-21 RX ORDER — OXYCODONE AND ACETAMINOPHEN 7.5; 325 MG/1; MG/1
1-2 TABLET ORAL EVERY 4 HOURS PRN
Qty: 30 TAB | Refills: 0 | Status: SHIPPED | OUTPATIENT
Start: 2017-04-21 | End: 2017-05-09

## 2017-04-21 RX ORDER — IBUPROFEN 600 MG/1
600 TABLET ORAL EVERY 8 HOURS PRN
Status: DISCONTINUED | OUTPATIENT
Start: 2017-04-21 | End: 2017-04-21 | Stop reason: HOSPADM

## 2017-04-21 RX ORDER — ENEMA 19; 7 G/133ML; G/133ML
1 ENEMA RECTAL PRN
Status: DISCONTINUED | OUTPATIENT
Start: 2017-04-21 | End: 2017-04-21 | Stop reason: HOSPADM

## 2017-04-21 RX ORDER — OXYCODONE AND ACETAMINOPHEN 7.5; 325 MG/1; MG/1
1-2 TABLET ORAL EVERY 4 HOURS PRN
Status: DISCONTINUED | OUTPATIENT
Start: 2017-04-21 | End: 2017-04-21 | Stop reason: HOSPADM

## 2017-04-21 RX ADMIN — SODIUM PHOSPHATE 133 ML: 7; 19 ENEMA RECTAL at 10:06

## 2017-04-21 RX ADMIN — GUAIFENESIN 600 MG: 600 TABLET, EXTENDED RELEASE ORAL at 08:25

## 2017-04-21 RX ADMIN — IBUPROFEN 600 MG: 600 TABLET, FILM COATED ORAL at 10:05

## 2017-04-21 RX ADMIN — FAMOTIDINE 20 MG: 20 TABLET, FILM COATED ORAL at 08:25

## 2017-04-21 RX ADMIN — FLUTICASONE PROPIONATE 100 MCG: 50 SPRAY, METERED NASAL at 08:26

## 2017-04-21 RX ADMIN — ENALAPRIL MALEATE 10 MG: 10 TABLET ORAL at 08:25

## 2017-04-21 RX ADMIN — METOPROLOL TARTRATE 100 MG: 100 TABLET, FILM COATED ORAL at 08:25

## 2017-04-21 RX ADMIN — POTASSIUM CHLORIDE 20 MEQ: 1500 TABLET, EXTENDED RELEASE ORAL at 08:25

## 2017-04-21 RX ADMIN — ONDANSETRON 4 MG: 2 INJECTION INTRAMUSCULAR; INTRAVENOUS at 02:28

## 2017-04-21 RX ADMIN — CLOPIDOGREL BISULFATE 75 MG: 75 TABLET, FILM COATED ORAL at 08:25

## 2017-04-21 RX ADMIN — MORPHINE SULFATE 3 MG: 4 INJECTION INTRAVENOUS at 02:28

## 2017-04-21 ASSESSMENT — PAIN SCALES - GENERAL
PAINLEVEL_OUTOF10: 4
PAINLEVEL_OUTOF10: 7
PAINLEVEL_OUTOF10: 2

## 2017-04-21 NOTE — PROGRESS NOTES
Patient c/o of nausea and abdominal pain, morphine and zofran administered. Patient had one small formed BM overnight. Ambulation encouraged.

## 2017-04-21 NOTE — PROGRESS NOTES
Pt complains of abdomen pain and cramping with walking.  Abdominal binder ordered for comfort per patient request.

## 2017-04-21 NOTE — PROGRESS NOTES
Paged Dr. Ruiz regarding oral pain meds. Pt requesting Motrin or other non-narcotic.  IV Morphine only med available on MAR.

## 2017-04-21 NOTE — PROGRESS NOTES
Assumed care at 0700. Received report from night shift RN. Bedside report completed. AOx4.  States pain is tolerable at this time.  Denies nausea.  Tolerating diet. +voiding. +BM.  Ambulating with steady gait. Pt call light and belongings within reach, fall precautions in place.  Wife at bedside.

## 2017-04-21 NOTE — DISCHARGE PLANNING
Upon utilization review, patient noted to be on the following medications that could potentially require prior authorization if prescribed at discharge: plavix.  If it is anticipated that patient will require these medications at discharge, beginning the prescription prior auth process in advance to anticipated discharge could assist in preventing delays when patient is medically cleared to be discharged from the hospital.

## 2017-04-22 NOTE — PROGRESS NOTES
Discharging Patient home per physician order.  Discharged with Family.  Demonstrated understanding of discharge instructions, follow up appointments, home medications, prescriptions, home care for surgical wound and nursing care instructions.  Ambulating without assistance, voiding without difficulty, pain well controlled, tolerating oral medications, oxygen saturation greater than 90%, tolerating diet.  Educational handouts given and discussed.  Verbalized understanding of discharge instructions and educational handouts.  All questions answered.  Belongings with patient at time of discharge.  Every other staple removed and steri strips placed per MD order.

## 2017-04-22 NOTE — DISCHARGE INSTRUCTIONS
Discharge Instructions    Discharged to home by car with relative. Discharged via wheelchair, hospital escort: Yes.  Special equipment needed: Not Applicable    Be sure to schedule a follow-up appointment with your primary care doctor or any specialists as instructed.     Discharge Plan:   Diet Plan: Discussed  Activity Level: Discussed  Confirmed Follow up Appointment: Patient to Call and Schedule Appointment  Confirmed Symptoms Management: Discussed  Medication Reconciliation Updated: Yes  Influenza Vaccine Indication: Indicated: 65 years and older  Influenza Vaccine Given - only chart on this line when given: Influenza Vaccine Given (See MAR)    Entiendo que se recomienda kasie dieta baja en colesterol, grasa y sodio para kasie buena skyler. A menos que me hayan dado instrucciones específicas a continuación para otra dieta, acepto esta instrucción sammie receta de mi dieta. Otra dieta: Dieta regular según lo tolerado Instrucciones especiales: Monitorear los signos y síntomas de infección (fiebre, escalofríos, náuseas, vómitos) Monitorear las incisiones de hinchazón, enrojecimiento o drenaje ¿El paciente es Post Transfusión de Mario? Sí INFORMACIÓN DE LA TRANSFUSIÓN POSTERIOR A LA MARIO (ADULTO) El propósito de la transfusión de mario es corregir la anemia, bajos niveles de factores de coagulación sanguínea o corregir la pérdida aguda de mario. La transfusión de mario es muy ayers, silvia ocasionalmente se producen reacciones adversas inesperadas. La mayoría de las reacciones adversas ocurren lm la transfusión, dentro de un a dos días después de la transfusión o de kasie a dos semanas después de la transfusión. Algunas reacciones adversas pueden ocurrir de harman a seis meses después de la transfusión y algunos incluso años después. Si alguno de los síntomas enumerados a continuación le sucede lm malik transfusión, Por favor notifique a malik enfermera inmediatamente. Fiebre o escalofríos Enrojecimiento de la ale  Urticaria, sarpullido o picazón Dificultad para respirar o falta de aire Dolor o exudado de jordan del sitio de la aguja IV Dolor lumbar Náuseas o vómitos Debilidad o desmayos Dolor de pecho Jordan en la orina Disminución de la frecuencia de la micción Los síntomas anteriores también pueden ocurrir dentro de los 24 a 48 después de la transfusión; Si es así, notifique a malik médico. Color amarillento de la piel [place] puede suceder de harman a seis meses después de la transfusión; Si es así, notifique a malik médico      I understand that a diet low in cholesterol, fat, and sodium is recommended for good health. Unless I have been given specific instructions below for another diet, I accept this instruction as my diet prescription.   Other diet: Regular diet as tolerated    Special Instructions: Monitor for signs and symptoms of infection (fever, chills, nausea, vomiting)  Monitor incisions for swelling, redness, or drainage      · Is patient Post Blood Transfusion?  Yes  POST BLOOD TRANSFUSION INFORMATION (ADULT)    The purpose of blood transfusion is to correct anemia, low levels of blood clotting factors or to correct acute blood loss.   Blood transfusion is very safe but occasionally unexpected adverse reactions do occur. Most adverse reactions occur during transfusion, within one to two days following transfusion or one to two weeks following transfusion. Some adverse reactions can occur one to six months after transfusion and some even years later.             If any of the symptoms listed below happen to you during your transfusion,                                 please notify your nurse immediately.   · Fever or Chills  · Flushing of the Face  · Hives, rash or itching  · Difficulty in breathing or shortness of breath  · Pain or oozing of blood from the IV needle site  · Low back pain  · Nausea or vomiting  · Weakness or fainting  · Chest pain  · Blood in the urine  · Decreased frequency of urination    The above  symptoms may also occur within 24 to 48 after transfusion; if so, notify your physician.     · Yellowing of the skin    This can happen one to six months after transfusion; if so, notify your physician    Angiografía  (Angiogram)  La angiografía es un procedimiento que se utiliza para observar los vasos sanguíneos. En camilo procedimiento, se inyecta kasie sustancia de contraste a través de un tubo luis y colin (catéter) colocado en kasie arteria. Luego se kristin imágenes con jamin X. Con los jamin X se observará si hay kasie obstrucción o un problema en un vaso sanguíneo.   INFORME A BELTRAN MÉDICO:  · Las alergias que tenga, incluidas alergias a mariscos o a sustancias de contraste.  · Todos los medicamentos que utiliza, incluidos vitaminas, hierbas, gotas oftálmicas, cremas y medicamentos de venta prabhu.  · Problemas previos que usted o los miembros de beltran sherlyn hayan tenido con el uso de anestésicos.  · Enfermedades de la mario.  · Cirugías previas.  · Cualquier problema o insuficiencia renal que haya sufrido.  · Enfermedades.  · Posibilidad de embarazo, si corresponde.  RIESGOS Y COMPLICACIONES  En general, la angiografía es un procedimiento seguro. Sin embargo, sammie en cualquier procedimiento, pueden surgir problemas. Estos son algunos posibles problemas:  · Lesiones en los vasos sanguíneos, incluyendo ruptura o sangrado.  · Infección o hematoma en el sitio de inserción del catéter.  · Reacción alérgica a la sustancia de contraste.  · Lesión renal debido a la tintura o sustancia de contraste.  · Coágulos que pueden conducir a un ictus o a un infarto.  ANTES DEL PROCEDIMIENTO  · No coma ni brannon nada después de la medianoche anterior al procedimiento, o según le haya indicado beltran médico.  · Consulte a beltran médico si puede beber la cantidad suficiente de agua para ernestina los medicamentos necesarios en la mañana del procedimiento.  PROCEDIMIENTO  · Pueden administrarle un medicamento que lo ayudará a relajarse (sedante) antes  y lm el procedimiento. Se lo administrarán a través de un catéter por vía intravenosa (IV) que se inserta en kasie de las venas.  · La momo en la que se insertará el catéter se lava y se rasura. Generalmente se inserta en la tylor, silvia puede insertarse en el pliegue del brazo (cerca del codo) o en la isac.  · Le aplicarán un medicamento para adormecer la momo en la que se insertará el catéter (anestesia local).  · El catéter se insertará en kasie arteria con kasie guía. El catéter se guía utilizando un tipo especial de radiografía (fluoroscopia) del vaso sanguíneo que se examina.  · Luego se inserta kasie sustancia de contraste especial en el catéter y se kristin imágenes de jamin X. Esta sustancia muestra si hay estrechamientos u obstrucciones.  DESPUÉS DEL PROCEDIMIENTO   · Si el procedimiento se realiza a través de la pierna, deberá permanecer acostado en la cama lm varias horas. Le indicarán que no flexione ni cruce las piernas.  · El lugar de la inserción será controlado con frecuencia.  · Le controlarán con frecuencia el pulso en el pie o en la isac.  · Le harán análisis de mario, radiografías y electrocardiogramas adicionales.  · Es posible que deba permanecer en observación en el hospital lm la noche.     Esta información no tiene sammie fin reemplazar el consejo del médico. Asegúrese de hacerle al médico cualquier pregunta que tenga.     Document Released: 09/27/2006 Document Revised: 01/08/2016  Elsevier Interactive Patient Education ©2016 [employer]    Angiogram  An angiogram, also called angiography, is a procedure used to look at the blood vessels. In this procedure, dye is injected through a long, thin tube (catheter) into an artery. X-rays are then taken. The X-rays will show if there is a blockage or problem in a blood vessel.   LET YOUR HEALTH CARE PROVIDER KNOW ABOUT:  · Any allergies you have, including allergies to shellfish or contrast dye.    · All medicines you are taking,  including vitamins, herbs, eye drops, creams, and over-the-counter medicines.    · Previous problems you or members of your family have had with the use of anesthetics.    · Any blood disorders you have.    · Previous surgeries you have had.  · Any previous kidney problems or failure you have had.   · Medical conditions you have.    · Possibility of pregnancy, if this applies.  RISKS AND COMPLICATIONS  Generally, an angiogram is a safe procedure. However, as with any procedure, problems can occur. Possible problems include:  · Injury to the blood vessels, including rupture or bleeding.  · Infection or bruising at the catheter site.  · Allergic reaction to the dye or contrast used.  · Kidney damage from the dye or contrast used.  · Blood clots that can lead to a stroke or heart attack.  BEFORE THE PROCEDURE  · Do not eat or drink after midnight on the night before the procedure, or as directed by your health care provider.    · Ask your health care provider if you may drink enough water to take any needed medicines the morning of the procedure.    PROCEDURE  · You may be given a medicine to help you relax (sedative) before and during the procedure. This medicine is given through an IV access tube that is inserted into one of your veins.    · The area where the catheter will be inserted will be washed and shaved. This is usually done in the groin but may be done in the fold of your arm (near your elbow) or in the wrist.  · A medicine will be given to numb the area where the catheter will be inserted (local anesthetic).  · The catheter will be inserted with a guide wire into an artery. The catheter is guided by using a type of X-ray (fluoroscopy) to the blood vessel being examined.    · Dye is then injected into the catheter, and X-rays are taken. The dye helps to show where any narrowing or blockages are located.    AFTER THE PROCEDURE   · If the procedure is done through the leg, you will be kept in bed lying flat  for several hours. You will be instructed to not bend or cross your legs.  · The insertion site will be checked frequently.  · The pulse in your feet or wrist will be checked frequently.  · Additional blood tests, X-rays, and electrocardiography may be done.    · You may need to stay in the hospital overnight for observation.       This information is not intended to replace advice given to you by your health care provider. Make sure you discuss any questions you have with your health care provider.     Document Released: 09/27/2006 Document Revised: 01/08/2016 Document Reviewed: 05/21/2014  [employer] Interactive Patient Education ©2016 [employer] Inc.    Angiograma, angioplastia, colocación de stent, cuidados luego del procedimiento  (Angiogram, Angioplasty, or Stent Placement, Care After)  En el día de hoy le lopez realizado alguno de los siguientes procedimientos:  ANGIOGRAMA:  Le lopez colocado un catéter en la vena de la tylor, le lopez inyectado kasie sustancia de contraste y lopez tomado imágenes.  ANGIOPLASTIA:  Le lopez colocado un catéter en la vena de la tylor y lo lopez dirigido hacia el lugar de la obstrucción al flujo sanguíneo. Para abrir la obstrucción se utilizó un balón o un stent de metal. Si no existen otras obstrucciones en la momo, los síntomas deben mejorar. Si hay kasie obstrucción por debajo de aubrey momo, podrá ser necesaria kasie cirugía.  STENT:  Le lopez colocado un catéter en la tylor a través del cual se insertó un tubo metálico en la momo de estrechamiento de la arteria para facilitar el flujo sanguíneo.  Le administraron sedantes por vía intravenosa. Estos medicamentos son eliminados rápidamente del torrente sanguíneo. Podrá sentir kasie molestia en el sitio de la inserción cuando termine el efecto del anestésico local. Rumsey mejora gradualmente luego de algunos días.  · Sólo tome medicamentos de venta prabhu o prescriptos para calmar el dolor, las molestias, o bajar la fiebre según las indicaciones de malik  médico.  · Las complicaciones no son frecuentes en camilo procedimiento. Diríjase al servicio de emergencias más cercano si desarrolla alguno de los siguientes síntomas:  · Dolor que empeora.  · Hemorragias.  · Hinchazón en el lugar de la punción.  · Mareos.  · Vahídos o desmayos.  · Fiebre o escalofríos.  · Si la herida le supura, le sangra o se le hincha aplique kasie presión firme y continua directamente sobre el sitio lm quince minutos y concurra al servicio de emergencias.  · Mantenga seca la piel que rodea el sitio de inserción. Puede ernestina kasie ducha después de 24 horas. Si la momo se moja, séquela con cuidado Evite los phuong hasta que el sitio de inserción se cure, generalmente luego de 5 a 7 días.  · La presencia de enrojecimiento, aumento del dolor o la hinchazón pueden ser signos de infección en la piel. Póngase en contacto con malik médico.  · Oanh reposo lm el mary del día y evite levantar objetos pesados (más de 5 kg) No opere maquinarias pesadas, no conduzca automóviles ni tome decisiones legales lm las primeras 24 horas luego del procedimiento. Oanh que kasie persona responsable lo lleve hasta malik casa.  · Puede reanudar malik dieta habitual luego del procedimiento. Evite las bebidas alcohólicas en las 24 horas posteriores  Document Released: 04/03/2008 Document Revised: 03/11/2013  ExitCare® Patient Information ©2014 [employer].    Angiogram, Angioplasty, or Stent Placement  Care After  One of the following procedures was done today.  ANGIOGRAM:  A catheter was placed through the blood vessel in your groin, contrast was injected into the vessels, and pictures were taken.  ANGIOPLASTY:  A catheter was placed through the blood vessel in your groin and directed to an area of blocked blood flow. A balloon, and possibly a metal stent were used to open the blockage. If no other blockages are present below this area, your symptoms should improve. If blockages are present below this area, surgery may  still be necessary.  STENT:  A catheter was placed in your groin through which a metal mesh tube was placed in a narrowed part of the artery to facilitate blood flow.  You were given intravenous sedation. These medications are rapidly cleared from your bloodstream. You may feel some discomfort at the insertion site after the local anesthetic wears off. This discomfort should gradually improve over the next several days.  · Only take over-the-counter or prescription medicines for pain, discomfort, or fever as directed by your caregiver.  · Complications are very uncommon after this procedure. Go to the nearest emergency department if you develop any of the following symptoms:  · Worsening pain.  · Bleeding.  · Swelling at the puncture site.  · Lightheadedness.  · Dizziness or fainting.  · Fever or chills.  · If oozing, bleeding, or a lump appears at the puncture site, apply firm pressure directly to the site steadily for 15 minutes and go to the emergency department.  · Keep the skin around the insertion site dry. You may take showers after 24 hours. If the area does get wet, dry the skin completely. Avoid baths until the skin puncture site heals, usually 5 to 7 days.  · Development of redness, increased soreness, or swelling may be signs of a skin infection. Contact your physician.  · Rest for the remainder of the day and avoid any heavy lifting (more than 10 pounds or 4.5 kg). Do not operate heavy machinery, drive, or make legal decisions for the first 24 hours after the procedure. Have a responsible person drive you home.  · You may resume your usual diet after the procedure. Avoid alcoholic beverages for 24 hours after the procedure.  Document Released: 12/18/2006 Document Revised: 03/11/2013 Document Reviewed: 10/17/2007  ExitCare® Patient Information ©2014 [employer].      Depression / Suicide Risk    As you are discharged from this [facility] facility, it is important to learn how to keep safe from  harming yourself.    Recognize the warning signs:  · Abrupt changes in personality, positive or negative- including increase in energy   · Giving away possessions  · Change in eating patterns- significant weight changes-  positive or negative  · Change in sleeping patterns- unable to sleep or sleeping all the time   · Unwillingness or inability to communicate  · Depression  · Unusual sadness, discouragement and loneliness  · Talk of wanting to die  · Neglect of personal appearance   · Rebelliousness- reckless behavior  · Withdrawal from people/activities they love  · Confusion- inability to concentrate     If you or a loved one observes any of these behaviors or has concerns about self-harm, here's what you can do:  · Talk about it- your feelings and reasons for harming yourself  · Remove any means that you might use to hurt yourself (examples: pills, rope, extension cords, firearm)  · Get professional help from the community (Mental Health, Substance Abuse, psychological counseling)  · Do not be alone:Call your Safe Contact- someone whom you trust who will be there for you.  · Call your local CRISIS HOTLINE 331-2182 or 657-758-3279  · Call your local Children's Mobile Crisis Response Team [place] (137) 218-1415 or www.Viewpoints  · Call the toll free National Suicide Prevention Hotlines   · National Suicide Prevention Lifeline 023-287-VBQH (8431)  · National Hope Line Network 800-SUICIDE (749-2583)

## 2017-04-22 NOTE — DISCHARGE SUMMARY
FINAL DIAGNOSES:  1.  Symptomatic infrarenal abdominal aortic aneurysm.  2.  Horseshoe kidney.  3.  Coronary artery disease.  4.  Hypertension.  5.  History of cardiac bypass.  6.  History of transient renal insufficiency postoperatively.    OPERATIONS:  1.  Repair of symptomatic infrarenal abdominal aortic aneurysm with   reimplantation of renal arteries by Dr. Charissa Kerr on 04/11/2017.  2.  Aortogram with left external iliac stent angioplasty by Dr. Charissa Kerr   on 04/17/2017.    SUMMARY:  This 67-year-old man returned from a trip to [place].  He was   diagnosed there as having an aneurysm.  He had low back pain that had   persisted since mid-to-late March.  He flew back to the [place] when   told in [place] that his aneurysm risk was 50%.  He underwent evaluation and an   operation by Dr. Kerr.  Postoperatively, the patient had a successful   recovery.  He had transient renal insufficiency, but his creatinine returned   progressively to normal.  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  This resulted in a palpable left dorsal pedal pulse   as well as posterior tibial pulse.  He was able to ambulate progressively   better during the hospitalization.  He had a prolonged period of abdominal   pain.  This was difficult to control and delayed his discharge.  He did not   respond well to Dulcolax suppositories or laxatives.  He did respond on the   day of discharge to a Fleets enema and his GI symptoms appear resolved.    Currently, he is afebrile.  Blood pressure is 152/89, heart rate is 82, and   room air oximetry is 90%.  His recent creatinine on 04/20/2017 was 1.18.    Potassium is slightly low at 3.5.  His hemoglobin is 10.5.  He is alert,   ambulatory, in no distress.  Chest is clear.  Heart, regular rhythm, palpable   dorsal pedal and posterior tibial pulses bilaterally without  edema.  Abdominal   incision is healing well with staples.  His abdomen is flat and soft.    DISPOSITION:  He is sent home to follow up in the next week to have the   remaining staples removed.  He has been instructed on showering and avoiding   strenuous activity and taking a laxative as needed.  He will be given a   prescription for Percocet for postoperative pain and has been cautioned to use   this sparingly.    DISCHARGE MEDICATIONS:  Include:  1.  Enalapril 10 mg daily.  2.  Imdur 5 mg daily.  3.  Losartan 100 mg each evening.  4.  Metoprolol 100 mg b.i.d.  5.  Nifedipine 30 mg each evening.  6.  Pentoxifylline 400 mg b.i.d.       ____________________________________     MD ROSA Priest / SAMUEL    DD:  04/21/2017 12:47:59  DT:  04/21/2017 21:44:13    D#:  [id number]  Job#:  [id number]    cc: Charissa Kerr MD

## 2017-05-09 ENCOUNTER — OFFICE VISIT (OUTPATIENT)
Dept: URGENT CARE | Facility: PHYSICIAN GROUP | Age: 67
End: 2017-05-09

## 2017-05-09 VITALS
SYSTOLIC BLOOD PRESSURE: 82 MMHG | OXYGEN SATURATION: 96 % | DIASTOLIC BLOOD PRESSURE: 48 MMHG | HEART RATE: 92 BPM | TEMPERATURE: 98.2 F | RESPIRATION RATE: 16 BRPM

## 2017-05-09 DIAGNOSIS — R42 DIZZY: ICD-10-CM

## 2017-05-09 DIAGNOSIS — I95.1 ORTHOSTATIC HYPOTENSION: ICD-10-CM

## 2017-05-09 DIAGNOSIS — R10.9 BELLY PAIN: ICD-10-CM

## 2017-05-09 PROCEDURE — 99203 OFFICE O/P NEW LOW 30 MIN: CPT | Performed by: FAMILY MEDICINE

## 2017-05-09 RX ORDER — HYDROCODONE BITARTRATE AND ACETAMINOPHEN 5; 300 MG/1; MG/1
TABLET ORAL
COMMUNITY
End: 2017-10-31

## 2017-05-09 ASSESSMENT — ENCOUNTER SYMPTOMS
NAUSEA: 1
DIARRHEA: 0
COUGH: 0
DIZZINESS: 1
FOCAL WEAKNESS: 0
CHILLS: 0
BLOOD IN STOOL: 0
SPUTUM PRODUCTION: 0
CONSTIPATION: 0
ORTHOPNEA: 0
SORE THROAT: 0
ABDOMINAL PAIN: 1
FEVER: 0

## 2017-05-09 NOTE — MR AVS SNAPSHOT
Danny Jono Antony   2017 12:15 PM   Office Visit   MRN: [id number]    Department:  [facility]   Dept Phone:  [phone]    Description:  Male : 1950   Provider:  Dylan Barron M.D.           Reason for Visit     Abdominal Pain Left mid side abd pain      Allergies as of 2017     No Known Allergies      You were diagnosed with     Belly pain   [739727]       Orthostatic hypotension   [458.0.ICD-9-CM]       Dizzy   [711259]         Vital Signs     Blood Pressure Pulse Temperature Respirations Oxygen Saturation Smoking Status    82/48 mmHg 92 36.8 °C (98.2 °F) 16 96% Unknown If Ever Smoked      Basic Information     Date Of Birth Sex Race Ethnicity Preferred Language    1950 Male Unable to Obtain  Origin (American,Indian,Macanese,Costa Rican, etc) English      Problem List              ICD-10-CM Priority Class Noted - Resolved    Aortic aneurysm without rupture (CMS-HCC) I71.9   2017 - Present    MI (myocardial infarction) (CMS-HCC) I21.3   Unknown - Present      Health Maintenance     Patient has no pending health maintenance at this time      Current Immunizations     Influenza Vaccine Adult HD 2017  5:20 PM      Below and/or attached are the medications your provider expects you to take. Review all of your home medications and newly ordered medications with your provider and/or pharmacist. Follow medication instructions as directed by your provider and/or pharmacist. Please keep your medication list with you and share with your provider. Update the information when medications are discontinued, doses are changed, or new medications (including over-the-counter products) are added; and carry medication information at all times in the event of emergency situations     Allergies:  No Known Allergies          Medications  Valid as of: May 09, 2017 -  2:09 PM    Generic Name Brand Name Tablet Size Instructions for use    Aspirin (Tab)  MG Take 325 mg by  mouth every 6 hours as needed for Mild Pain.        Enalapril Maleate (Tab) VASOTEC 10 MG Take 10 mg by mouth 2 times a day.        Hydrocodone-Acetaminophen (Tab) Hydrocodone-Acetaminophen 5-300 MG Take  by mouth.        Metoprolol Tartrate (Tab) LOPRESSOR 100 MG Take 100 mg by mouth 2 times a day.        Polyethylene Glycol 3350   Take  by mouth.        .                 Medicines prescribed today were sent to:     [address] ([address]), [place] [address] [address] [facility] (S) [place] [address]    Phone: [phone] Fax: [phone]    Open 24 Hours?: No      Medication refill instructions:       If your prescription bottle indicates you have medication refills left, it is not necessary to call your provider’s office. Please contact your pharmacy and they will refill your medication.    If your prescription bottle indicates you do not have any refills left, you may request refills at any time through one of the following ways: The online TeamStreamz system (except Urgent Care), by calling your provider’s office, or by asking your pharmacy to contact your provider’s office with a refill request. Medication refills are processed only during regular business hours and may not be available until the next business day. Your provider may request additional information or to have a follow-up visit with you prior to refilling your medication.   *Please Note: Medication refills are assigned a new Rx number when refilled electronically. Your pharmacy may indicate that no refills were authorized even though a new prescription for the same medication is available at the pharmacy. Please request the medicine by name with the pharmacy before contacting your provider for a refill.           TeamStreamz Access Code: [email or web address]  Expires: 5/21/2017  5:30 PM    TeamStreamz  A secure, online tool to manage your health information     [employer]’s TeamStreamz® is a secure, online tool that connects you to your personalized  health information from the privacy of your home -- day or night - making it very easy for you to manage your healthcare. Once the activation process is completed, you can even access your medical information using the Grupo A aashish, which is available for free in the Apple Aashihs store or Google Play store.     Grupo A provides the following levels of access (as shown below):   My Chart Features   [facility] Doctor [facility]  Specialists [facility]  Urgent  Care Non-[facility]  Primary Care  Doctor   Email your healthcare team securely and privately 24/7 X X X    Manage appointments: schedule your next appointment; view details of past/upcoming appointments X      Request prescription refills. X      View recent personal medical records, including lab and immunizations X X X X   View health record, including health history, allergies, medications X X X X   Read reports about your outpatient visits, procedures, consult and ER notes X X X X   See your discharge summary, which is a recap of your hospital and/or ER visit that includes your diagnosis, lab results, and care plan. X X       How to register for Grupo A:  1. Go to  [email or web address].  2. Click on the Sign Up Now box, which takes you to the New Member Sign Up page. You will need to provide the following information:  a. Enter your Grupo A Access Code exactly as it appears at the top of this page. (You will not need to use this code after you’ve completed the sign-up process. If you do not sign up before the expiration date, you must request a new code.)   b. Enter your date of birth.   c. Enter your home email address.   d. Click Submit, and follow the next screen’s instructions.  3. Create a Grupo A ID. This will be your Grupo A login ID and cannot be changed, so think of one that is secure and easy to remember.  4. Create a Grupo A password. You can change your password at any time.  5. Enter your Password Reset Question and Answer. This can be used at a  later time if you forget your password.   6. Enter your e-mail address. This allows you to receive e-mail notifications when new information is available in Tymphany.  7. Click Sign Up. You can now view your health information.    For assistance activating your Tymphany account, call [phone]

## 2017-05-09 NOTE — PROGRESS NOTES
Subjective:      Danny Antony is a 67 y.o. male who presents with Abdominal Pain    Chief Complaint   Patient presents with   • Abdominal Pain     Left mid side abd pain        - This is a very pleasant 67 y.o. male with complaints of mild LUQ  Pain x 1-1.5 weeks, getting slowly worse and associated w/ nausea for past 3 days and not able to eat/drink much over past 2-3 days and says started feeling weak and a little dizzy today. No cp/sob     - family say he has AAA repaired last month and a stend put into Lt groin about 3 weeks ago.             ALLERGIES:  Review of patient's allergies indicates no known allergies.     PMH:  Past Medical History   Diagnosis Date   • MI (myocardial infarction) (CMS-[facility]) 2013        MEDS:    Current outpatient prescriptions:   •  Hydrocodone-Acetaminophen (VICODIN) 5-300 MG Tab, Take  by mouth., Disp: , Rfl:   •  Polyethylene Glycol 3350 (MIRALAX PO), Take  by mouth., Disp: , Rfl:   •  metoprolol (LOPRESSOR) 100 MG Tab, Take 100 mg by mouth 2 times a day., Disp: , Rfl:   •  enalapril (VASOTEC) 10 MG Tab, Take 10 mg by mouth 2 times a day., Disp: , Rfl:   •  aspirin (ASA) 325 MG Tab, Take 325 mg by mouth every 6 hours as needed for Mild Pain., Disp: , Rfl:     ** Past medical, social, family and surgical history documented in HPI or under PMH/PSH/FH section, otherwise it is contributory **             HPI    Review of Systems   Constitutional: Positive for malaise/fatigue. Negative for fever and chills.   HENT: Negative for congestion and sore throat.    Respiratory: Negative for cough and sputum production.    Cardiovascular: Negative for chest pain and orthopnea.   Gastrointestinal: Positive for nausea and abdominal pain. Negative for diarrhea, constipation, blood in stool and melena.   Neurological: Positive for dizziness. Negative for focal weakness.          Objective:     BP 82/48 mmHg  Pulse 92  Temp(Src) 36.8 °C (98.2 °F)  Resp 16  SpO2 96%     Physical Exam    Constitutional: He appears well-developed. No distress.   HENT:   Head: Normocephalic and atraumatic.   Mouth/Throat: Oropharynx is clear and moist.   Eyes: Conjunctivae are normal.   Neck: Neck supple.   Cardiovascular: Regular rhythm.    No murmur heard.  Pulmonary/Chest: Effort normal and breath sounds normal.   Abdominal: Soft. Bowel sounds are normal. He exhibits no distension. There is tenderness ( LUQ). There is no rebound and no guarding.   Lymphadenopathy:     He has no cervical adenopathy.   Neurological: He is alert. He exhibits normal muscle tone.   Skin: Skin is warm and dry.   Psychiatric: He has a normal mood and affect. Judgment normal.   Nursing note and vitals reviewed.              Assessment/Plan:         1. Belly pain  POCT Urinalysis   2. Orthostatic hypotension     3. Dizzy               Patient sent to    [facility]  ER by POV. Spoke to  Reyes  Who accepted patient at 1;03pm     .  Reiterated to patient that although a provider to provider transfer was made this will not necessarily expedite the ER process.

## 2017-07-19 ENCOUNTER — HOSPITAL ENCOUNTER (EMERGENCY)
Facility: MEDICAL CENTER | Age: 67
End: 2017-07-19
Attending: EMERGENCY MEDICINE
Payer: MEDICARE

## 2017-07-19 ENCOUNTER — APPOINTMENT (OUTPATIENT)
Dept: RADIOLOGY | Facility: MEDICAL CENTER | Age: 67
End: 2017-07-19
Attending: EMERGENCY MEDICINE
Payer: MEDICARE

## 2017-07-19 VITALS
SYSTOLIC BLOOD PRESSURE: 130 MMHG | TEMPERATURE: 97.5 F | RESPIRATION RATE: 15 BRPM | WEIGHT: 171.74 LBS | DIASTOLIC BLOOD PRESSURE: 70 MMHG | HEIGHT: 72 IN | OXYGEN SATURATION: 98 % | HEART RATE: 62 BPM | BODY MASS INDEX: 23.26 KG/M2

## 2017-07-19 DIAGNOSIS — R10.12 LEFT UPPER QUADRANT PAIN: ICD-10-CM

## 2017-07-19 LAB
ALBUMIN SERPL BCP-MCNC: 4.3 G/DL (ref 3.2–4.9)
ALBUMIN/GLOB SERPL: 1.1 G/DL
ALP SERPL-CCNC: 101 U/L (ref 30–99)
ALT SERPL-CCNC: 10 U/L (ref 2–50)
ANION GAP SERPL CALC-SCNC: 11 MMOL/L (ref 0–11.9)
APPEARANCE UR: CLEAR
AST SERPL-CCNC: 13 U/L (ref 12–45)
BASOPHILS # BLD AUTO: 0.6 % (ref 0–1.8)
BASOPHILS # BLD: 0.05 K/UL (ref 0–0.12)
BILIRUB SERPL-MCNC: 0.4 MG/DL (ref 0.1–1.5)
BILIRUB UR QL STRIP.AUTO: NEGATIVE
BUN SERPL-MCNC: 15 MG/DL (ref 8–22)
CALCIUM SERPL-MCNC: 9.8 MG/DL (ref 8.5–10.5)
CHLORIDE SERPL-SCNC: 101 MMOL/L (ref 96–112)
CO2 SERPL-SCNC: 24 MMOL/L (ref 20–33)
COLOR UR: YELLOW
CREAT SERPL-MCNC: 1.25 MG/DL (ref 0.5–1.4)
CULTURE IF INDICATED INDCX: NO UA CULTURE
EKG IMPRESSION: NORMAL
EOSINOPHIL # BLD AUTO: 0.29 K/UL (ref 0–0.51)
EOSINOPHIL NFR BLD: 3.2 % (ref 0–6.9)
ERYTHROCYTE [DISTWIDTH] IN BLOOD BY AUTOMATED COUNT: 42.3 FL (ref 35.9–50)
GFR SERPL CREATININE-BSD FRML MDRD: 58 ML/MIN/1.73 M 2
GLOBULIN SER CALC-MCNC: 3.8 G/DL (ref 1.9–3.5)
GLUCOSE SERPL-MCNC: 129 MG/DL (ref 65–99)
GLUCOSE UR STRIP.AUTO-MCNC: NEGATIVE MG/DL
HCT VFR BLD AUTO: 49.1 % (ref 42–52)
HGB BLD-MCNC: 16.2 G/DL (ref 14–18)
IMM GRANULOCYTES # BLD AUTO: 0.01 K/UL (ref 0–0.11)
IMM GRANULOCYTES NFR BLD AUTO: 0.1 % (ref 0–0.9)
KETONES UR STRIP.AUTO-MCNC: NEGATIVE MG/DL
LEUKOCYTE ESTERASE UR QL STRIP.AUTO: NEGATIVE
LIPASE SERPL-CCNC: 40 U/L (ref 11–82)
LYMPHOCYTES # BLD AUTO: 2.64 K/UL (ref 1–4.8)
LYMPHOCYTES NFR BLD: 29.2 % (ref 22–41)
MCH RBC QN AUTO: 26.8 PG (ref 27–33)
MCHC RBC AUTO-ENTMCNC: 33 G/DL (ref 33.7–35.3)
MCV RBC AUTO: 81.3 FL (ref 81.4–97.8)
MICRO URNS: NORMAL
MONOCYTES # BLD AUTO: 0.65 K/UL (ref 0–0.85)
MONOCYTES NFR BLD AUTO: 7.2 % (ref 0–13.4)
NEUTROPHILS # BLD AUTO: 5.41 K/UL (ref 1.82–7.42)
NEUTROPHILS NFR BLD: 59.7 % (ref 44–72)
NITRITE UR QL STRIP.AUTO: NEGATIVE
NRBC # BLD AUTO: 0 K/UL
NRBC BLD AUTO-RTO: 0 /100 WBC
PH UR STRIP.AUTO: 7.5 [PH]
PLATELET # BLD AUTO: 209 K/UL (ref 164–446)
PMV BLD AUTO: 12.2 FL (ref 9–12.9)
POTASSIUM SERPL-SCNC: 3.6 MMOL/L (ref 3.6–5.5)
PROT SERPL-MCNC: 8.1 G/DL (ref 6–8.2)
PROT UR QL STRIP: NEGATIVE MG/DL
RBC # BLD AUTO: 6.04 M/UL (ref 4.7–6.1)
RBC UR QL AUTO: NEGATIVE
SODIUM SERPL-SCNC: 136 MMOL/L (ref 135–145)
SP GR UR STRIP.AUTO: 1.02
TROPONIN I SERPL-MCNC: 0.03 NG/ML (ref 0–0.04)
UROBILINOGEN UR STRIP.AUTO-MCNC: 0.2 MG/DL
WBC # BLD AUTO: 9.1 K/UL (ref 4.8–10.8)

## 2017-07-19 PROCEDURE — 700105 HCHG RX REV CODE 258: Performed by: EMERGENCY MEDICINE

## 2017-07-19 PROCEDURE — 80053 COMPREHEN METABOLIC PANEL: CPT

## 2017-07-19 PROCEDURE — 83690 ASSAY OF LIPASE: CPT

## 2017-07-19 PROCEDURE — 84484 ASSAY OF TROPONIN QUANT: CPT

## 2017-07-19 PROCEDURE — 99285 EMERGENCY DEPT VISIT HI MDM: CPT

## 2017-07-19 PROCEDURE — 36415 COLL VENOUS BLD VENIPUNCTURE: CPT

## 2017-07-19 PROCEDURE — 93005 ELECTROCARDIOGRAM TRACING: CPT | Performed by: EMERGENCY MEDICINE

## 2017-07-19 PROCEDURE — 96374 THER/PROPH/DIAG INJ IV PUSH: CPT

## 2017-07-19 PROCEDURE — 81003 URINALYSIS AUTO W/O SCOPE: CPT

## 2017-07-19 PROCEDURE — 71010 DX-CHEST-LIMITED (1 VIEW): CPT

## 2017-07-19 PROCEDURE — 71275 CT ANGIOGRAPHY CHEST: CPT

## 2017-07-19 PROCEDURE — 85025 COMPLETE CBC W/AUTO DIFF WBC: CPT

## 2017-07-19 PROCEDURE — 700117 HCHG RX CONTRAST REV CODE 255: Performed by: EMERGENCY MEDICINE

## 2017-07-19 PROCEDURE — 700111 HCHG RX REV CODE 636 W/ 250 OVERRIDE (IP): Performed by: EMERGENCY MEDICINE

## 2017-07-19 RX ORDER — SODIUM CHLORIDE 9 MG/ML
1000 INJECTION, SOLUTION INTRAVENOUS ONCE
Status: COMPLETED | OUTPATIENT
Start: 2017-07-19 | End: 2017-07-19

## 2017-07-19 RX ORDER — MORPHINE SULFATE 4 MG/ML
4 INJECTION, SOLUTION INTRAMUSCULAR; INTRAVENOUS ONCE
Status: COMPLETED | OUTPATIENT
Start: 2017-07-19 | End: 2017-07-19

## 2017-07-19 RX ADMIN — SODIUM CHLORIDE 1000 ML: 9 INJECTION, SOLUTION INTRAVENOUS at 19:50

## 2017-07-19 RX ADMIN — IOHEXOL 100 ML: 350 INJECTION, SOLUTION INTRAVENOUS at 19:48

## 2017-07-19 RX ADMIN — MORPHINE SULFATE 4 MG: 4 INJECTION INTRAVENOUS at 20:12

## 2017-07-19 ASSESSMENT — PAIN SCALES - GENERAL
PAINLEVEL_OUTOF10: 10

## 2017-07-19 NOTE — ED AVS SNAPSHOT
7/19/2017    Danny Antony  [address] [address]  [place] [address]    Dear Danny:    [facility] wants to ensure your discharge home is safe and you or your loved ones have had all of your questions answered regarding your care after you leave the hospital.    Below is a list of resources and contact information should you have any questions regarding your hospital stay, follow-up instructions, or active medical symptoms.    Questions or Concerns Regarding… Contact   Medical Questions Related to Your Discharge  (7 days a week, 8am-5pm) Contact a Nurse Care Coordinator   [phone]   Medical Questions Not Related to Your Discharge  (24 hours a day / 7 days a week)  Contact the Nurse Health Line   [phone]    Medications or Discharge Instructions Refer to your discharge packet   or contact your [facility] Primary Care Provider   [phone]   Follow-up Appointment(s) Schedule your appointment via CultureIQ   or contact Scheduling [phone]   Billing Review your statement via CultureIQ  or contact Billing [phone]   Medical Records Review your records via CultureIQ   or contact Medical Records [phone]     You may receive a telephone call within two days of discharge. This call is to make certain you understand your discharge instructions and have the opportunity to have any questions answered. You can also easily access your medical information, test results and upcoming appointments via the CultureIQ free online health management tool. You can learn more and sign up at [email or web address]. For assistance setting up your CultureIQ account, please call [phone].    Once again, we want to ensure your discharge home is safe and that you have a clear understanding of any next steps in your care. If you have any questions or concerns, please do not hesitate to contact us, we are here for you. Thank you for choosing [facility] for your healthcare needs.    Sincerely,    Your [facility] Healthcare Team

## 2017-07-19 NOTE — ED AVS SNAPSHOT
Zingfin Access Code: [email or web address]  Expires: 8/6/2017  4:09 AM    Zingfin  A secure, online tool to manage your health information     [employer]’s Zingfin® is a secure, online tool that connects you to your personalized health information from the privacy of your home -- day or night - making it very easy for you to manage your healthcare. Once the activation process is completed, you can even access your medical information using the Zingfin aashish, which is available for free in the Apple Aashish store or Google Play store.     Zingfin provides the following levels of access (as shown below):   My Chart Features   [facility] Primary Care Doctor [facility]  Specialists [facility]  Urgent  Care Non-[facility]  Primary Care  Doctor   Email your healthcare team securely and privately 24/7 X X X X   Manage appointments: schedule your next appointment; view details of past/upcoming appointments X      Request prescription refills. X      View recent personal medical records, including lab and immunizations X X X X   View health record, including health history, allergies, medications X X X X   Read reports about your outpatient visits, procedures, consult and ER notes X X X X   See your discharge summary, which is a recap of your hospital and/or ER visit that includes your diagnosis, lab results, and care plan. X X       How to register for Zingfin:  1. Go to  [email or web address].  2. Click on the Sign Up Now box, which takes you to the New Member Sign Up page. You will need to provide the following information:  a. Enter your Zingfin Access Code exactly as it appears at the top of this page. (You will not need to use this code after you’ve completed the sign-up process. If you do not sign up before the expiration date, you must request a new code.)   b. Enter your date of birth.   c. Enter your home email address.   d. Click Submit, and follow the next screen’s instructions.  3. Create a Zingfin ID. This will be your Zingfin  login ID and cannot be changed, so think of one that is secure and easy to remember.  4. Create a BIOSAFE password. You can change your password at any time.  5. Enter your Password Reset Question and Answer. This can be used at a later time if you forget your password.   6. Enter your e-mail address. This allows you to receive e-mail notifications when new information is available in BIOSAFE.  7. Click Sign Up. You can now view your health information.    For assistance activating your BIOSAFE account, call [phone]

## 2017-07-19 NOTE — ED AVS SNAPSHOT
Home Care Instructions                                                                                                                Danny Antony   MRN: [id number]    Department:  [facility], Emergency Dept   Date of Visit:  7/19/2017            [facility], Emergency Dept    [address] [place] [address]    Phone:  [phone]      You were seen by     Chun Dsouza M.D.      Your Diagnosis Was     Left upper quadrant pain     R10.12       These are the medications you received during your hospitalization from 07/19/2017 1912 to 07/19/2017 2225     Date/Time Order Dose Route Action    07/19/2017 1950 NS infusion 1,000 mL 1,000 mL Intravenous New Bag    07/19/2017 1948 iohexol (OMNIPAQUE) 350 mg/mL 100 mL Intravenous Given    07/19/2017 2012 morphine (pf) 4 mg/ml injection 4 mg 4 mg Intravenous Given      Follow-up Information     1. Follow up with [facility], Emergency Dept.    Specialty:  Emergency Medicine    Why:  If symptoms worsen, As needed    Contact information    [address] [place] [id number] [phone]        2. Follow up with your gastroenterologist as scheduled Monday.      Medication Information     Review all of your home medications and newly ordered medications with your primary doctor and/or pharmacist as soon as possible. Follow medication instructions as directed by your doctor and/or pharmacist.     Please keep your complete medication list with you and share with your physician. Update the information when medications are discontinued, doses are changed, or new medications (including over-the-counter products) are added; and carry medication information at all times in the event of emergency situations.               Medication List      ASK your doctor about these medications        Instructions    Morning Afternoon Evening Bedtime    aspirin 325 MG Tabs   Commonly known as:  ASA        Take 325 mg  by mouth every 6 hours as needed for Mild Pain.   Dose:  325 mg                        enalapril 10 MG Tabs   Commonly known as:  VASOTEC        Take 10 mg by mouth 2 times a day.   Dose:  10 mg                        metoprolol 100 MG Tabs   Commonly known as:  LOPRESSOR        Take 100 mg by mouth 2 times a day.   Dose:  100 mg                        MIRALAX PO        Take  by mouth.                        VICODIN 5-300 MG Tabs   Generic drug:  Hydrocodone-Acetaminophen        Take  by mouth.                                Procedures and tests performed during your visit     CBC WITH DIFFERENTIAL    COMP METABOLIC PANEL    CT-THORACIC AORTA EVALUATION    Cardiac Monitoring    DX-CHEST-LIMITED (1 VIEW)    EKG (ER)    ESTIMATED GFR    IV Saline Lock    LIPASE    Oxygen Therapy Per Protocol    TROPONIN    URINALYSIS,CULTURE IF INDICATED        Discharge Instructions       Return immediately to the Emergency Department if you experience continuing or worsening discomfort in your abdomen, fever, chest pain, difficulty breathing, back pain, or any other new or worsening symptoms.       Dolor abdominal   (Abdominal Pain)   El dolor abdominal o dolor en el estómago puede ser causado por muchos factores. El médico decidirá la gravedad de la causa dolor por medio de un examen físico y le solicitará pruebas y radiografías. Muchos de estos casos pueden controlarse y tratarse en casa. La mayor parte de los pal en la momo abdominal que padecen los niños es funcional. [place] significa que no  está originado en ninguna enfermedad y que probablemente mejorará sin tratamiento.   INSTRUCCIONES PARA EL CUIDADO EN EL HOGAR  · No tome ni administre laxantes a menos que se lo haya indicado el médico.  · Utilice los medicamentos de venta prabhu o de prescripción para el dolor, el malestar o la fiebre, según se lo indique el médico.  · Corwith medicación para el alivio del dolor sólo si se lo ha indicado el profesional.  · Consuma kasie dieta  líquida: caldo, té o agua el tiempo que se lo indique malik médico. Luego podrá gradualmente consumir kasie dieta blanda según lo que tolere cada paciente.  SOLICITE ATENCIÓN MÉDICA DE INMEDIATO SI:  · El dolor persiste.  · Tiene fiebre.  · Presenta vómitos repetidas veces.  · Siente el dolor sólo en algunos sectores del abdomen (vientre). Si se localiza en la momo derecha, posiblemente podría tratarse de apendicitis. En un adulto, si se localiza en la región inferior izquierda del abdomen, podría tratarse de colitis o diverticulitis.  · Hay mario en las heces (deposiciones de color aguayo brillante o vinh alquitranado).  ASEGÚRESE DE QUE:   · Comprende las instrucciones para el juan médica.  · Controlará malik enfermedad.  · Solicitará atención médica de inmediato según las indicaciones.  Document Released: 12/18/2006 Document Revised: 06/18/2013  ExitMeditech Solution® Patient Information ©2014 VisConPro.            Patient Information     Patient Information    Following emergency treatment: all patient requiring follow-up care must return either to a private physician or a clinic if your condition worsens before you are able to obtain further medical attention, please return to the emergency room.     Billing Information    At [facility], we work to make the billing process streamlined for our patients.  Our Representatives are here to answer any questions you may have regarding your hospital bill.  If you have insurance coverage and have supplied your insurance information to us, we will submit a claim to your insurer on your behalf.  Should you have any questions regarding your bill, we can be reached online or by phone as follows:  Online: You are able pay your bills online or live chat with our representatives about any billing questions you may have. We are here to help Monday - Friday from 8:00am to 7:30pm and 9:00am - 12:00pm on Saturdays.  Please visit [email or web address]  for more  information.   Phone:  [phone] or [phone]    Please note that your emergency physician, surgeon, pathologist, radiologist, anesthesiologist, and other specialists are not employed by [facility] and will therefore bill separately for their services.  Please contact them directly for any questions concerning their bills at the numbers below:     Emergency Physician Services:  [phone]  [facility]:  [phone]  [facility]:  [phone]  [facility]:  [phone]    1. Your final bill may vary from the amount quoted upon discharge if all procedures are not complete at that time, or if your doctor has additional procedures of which we are not aware. You will receive an additional bill if you return to the Emergency Department at [facility] for suture removal regardless of the facility of which the sutures were placed.     2. Please arrange for settlement of this account at the emergency registration.    3. All self-pay accounts are due in full at the time of treatment.  If you are unable to meet this obligation then payment is expected within 4-5 days.     4. If you have had radiology studies (CT, X-ray, Ultrasound, MRI), you have received a preliminary result during your emergency department visit. Please contact the radiology department [phone] to receive a copy of your final result. Please discuss the Final result with your primary physician or with the follow up physician provided.     Crisis Hotline:  [place] Crisis Hotline:  0-549-WBWGLSX or 1-400.933.8854  [place] Crisis Hotline:    1-765.265.8830 or 046-662-8043         ED Discharge Follow Up Questions    1. In order to provide you with very good care, we would like to follow up with a phone call in the next few days.  May we have your permission to contact you?     YES /  NO    2. What is the best phone number to call you? (       )_____-__________    3. What is the best time to call  you?      Morning  /  Afternoon  /  Evening                   Patient Signature:  ____________________________________________________________    Date:  ____________________________________________________________      Your appointments     Aug 14, 2017  3:15 PM   NEW PATIENT with Brenda Bee MD,[facility]--)    [address]  [address]  [place] [id number] [phone]

## 2017-07-20 ENCOUNTER — PATIENT OUTREACH (OUTPATIENT)
Dept: HEALTH INFORMATION MANAGEMENT | Facility: OTHER | Age: 67
End: 2017-07-20

## 2017-07-20 NOTE — ED NOTES
VSS.  PIVs d/c'dintact.  Discharge instructions given to pt and family- verbalizes understanding.

## 2017-07-20 NOTE — ED NOTES
ASST RN- pt back in room and placed on monitors. IVF infusing per ERPs orders. EWKG preformed bedside. Family remains at bedside.

## 2017-07-20 NOTE — DISCHARGE INSTRUCTIONS
Return immediately to the Emergency Department if you experience continuing or worsening discomfort in your abdomen, fever, chest pain, difficulty breathing, back pain, or any other new or worsening symptoms.       Dolor abdominal   (Abdominal Pain)   El dolor abdominal o dolor en el estómago puede ser causado por muchos factores. El médico decidirá la gravedad de la causa dolor por medio de un examen físico y le solicitará pruebas y radiografías. Muchos de estos casos pueden controlarse y tratarse en casa. La mayor parte de los pal en la momo abdominal que padecen los niños es funcional. Scotts significa que no  está originado en ninguna enfermedad y que probablemente mejorará sin tratamiento.   INSTRUCCIONES PARA EL CUIDADO EN EL HOGAR  · No tome ni administre laxantes a menos que se lo haya indicado el médico.  · Utilice los medicamentos de venta prabhu o de prescripción para el dolor, el malestar o la fiebre, según se lo indique el médico.  · Bruning medicación para el alivio del dolor sólo si se lo ha indicado el profesional.  · Consuma kasie dieta líquida: caldo, té o agua el tiempo que se lo indique malik médico. Luego podrá gradualmente consumir kasie dieta blanda según lo que tolere cada paciente.  SOLICITE ATENCIÓN MÉDICA DE INMEDIATO SI:  · El dolor persiste.  · Tiene fiebre.  · Presenta vómitos repetidas veces.  · Siente el dolor sólo en algunos sectores del abdomen (vientre). Si se localiza en la momo derecha, posiblemente podría tratarse de apendicitis. En un adulto, si se localiza en la región inferior izquierda del abdomen, podría tratarse de colitis o diverticulitis.  · Hay mario en las heces (deposiciones de color aguayo brillante o vinh alquitranado).  ASEGÚRESE DE QUE:   · Comprende las instrucciones para el juan médica.  · Controlará malik enfermedad.  · Solicitará atención médica de inmediato según las indicaciones.  Document Released: 12/18/2006 Document Revised: 06/18/2013  [facility] [place] Patient Information  ©2014 [facility].

## 2017-07-20 NOTE — ED NOTES
Pt resting comfortably,  States pain improved with medication.  Family at bedside. Denies needs at this time.

## 2017-07-20 NOTE — ED NOTES
CAROLAT RN- PIV x2 established, blood work drawn and sent to lab. Pt placed on zoll monitor and taken to CT for imaging.

## 2017-07-20 NOTE — ED NOTES
.  Chief Complaint   Patient presents with   • Abdominal Pain     Epigastric/LUQ pain radiating to back, sharp, stabbing, sudden onset 40 minutes ago.      Hx of aortic aneurysm, repaired in April.  Patient c/o dizziness, nausea, appears in distress and significant pain.     Aorta screening tool of 2.  Charge RN notified.  Patient roomed immediately to Red 4.

## 2017-07-20 NOTE — ED PROVIDER NOTES
ED Provider Note    CHIEF COMPLAINT  Chief Complaint   Patient presents with   • Abdominal Pain     Epigastric/LUQ pain radiating to back, sharp, stabbing, sudden onset 40 minutes ago.        HPI  Danny Antony is a 67 y.o. male who presents for evaluation of left upper quadrant abdominal pain acute onset 2 hours prior to my examination, sharp and radiates to his left shoulder. He has a history of a AAA operated on 3 months ago. Today's in his normal state of health when he developed this pain acute onset associated with nausea and some lightheadedness. No vomiting, no diarrhea. Otherwise he has no chest pain or shortness of breath other than stating that the pain seems to make it difficult to breathe. He has no fever. No difficulty urination. Otherwise medical history significant for a coronary artery bypass grafting secondary to severe coronary disease.    REVIEW OF SYSTEMS  Negative for fever, rash, chest pain, vomiting, diarrhea, headache, focal weakness, focal numbness, focal tingling. All other systems are negative.     PAST MEDICAL HISTORY  Past Medical History   Diagnosis Date   • MI (myocardial infarction) (CMS-HCC) 2013       FAMILY HISTORY  No family history on file.    SOCIAL HISTORY  Social History   Substance Use Topics   • Smoking status: Unknown If Ever Smoked   • Smokeless tobacco: Not on file   • Alcohol Use: Not on file       SURGICAL HISTORY  Past Surgical History   Procedure Laterality Date   • Other cardiac surgery  2013     Triple bipass   • Abdominal aortic aneurysm N/A 4/11/2017     Procedure: ABDOMINAL AORTIC ANEURYSM;  Surgeon: Charissa Kerr M.D.;  Location: SURGERY [facility];  Service:        CURRENT MEDICATIONS  I personally reviewed the medication list in the charting documentation.     ALLERGIES  No Known Allergies    MEDICAL RECORD  I have reviewed patient's medical record and pertinent results are listed above.      PHYSICAL EXAM  VITAL SIGNS: /70 mmHg  Pulse  79  Temp(Src) 36.7 °C (98.1 °F)  Resp 19  Ht 1.829 m (6')  Wt 77.9 kg (171 lb 11.8 oz)  BMI 23.29 kg/m2  SpO2 99%   Constitutional: Well appearing patient in no acute distress.  Not toxic, nor ill in appearance.  HENT: Mucus membranes moist.    Eyes: No scleral icterus. Normal conjunctiva   Neck: Supple, comfortable, nonpainful range of motion.   Cardiovascular: Regular heart rate and rhythm.   Thorax & Lungs: Chest is nontender.  Lungs are clear to auscultation with good air movement bilaterally.  No wheeze, rhonchi, nor rales.   Abdomen: Soft, with no tenderness, no distention, no rebound nor guarding. Palpation the left upper quadrant improves his pain.  Skin: Warm, dry. No rash appreciated  Extremities/Musculoskeletal: No sign of trauma. No asymmetric calf tenderness, erythema or edema. Normal range of motion   Neurologic: Alert & oriented. No focal deficits observed.   Psychiatric: Normal affect appropriate for the clinical situation.    DIAGNOSTIC STUDIES / PROCEDURES    EKG  12 Lead EKG interpreted by me to show:    Rate 62  Rhythm: Normal sinus rhythm  Axis: Normal  DC and QRS Intervals: First-degree AV block  T waves: Inverted T waves V5 and V6  ST segments: No acute changes  Ectopy: None.    My impression of this EKG: Does not indicate ischemia or arrythmia at this time. No significant change compared to 4/11/17      LABS  Results for orders placed or performed during the hospital encounter of 07/19/17   CBC WITH DIFFERENTIAL   Result Value Ref Range    WBC 9.1 4.8 - 10.8 K/uL    RBC 6.04 4.70 - 6.10 M/uL    Hemoglobin 16.2 14.0 - 18.0 g/dL    Hematocrit 49.1 42.0 - 52.0 %    MCV 81.3 (L) 81.4 - 97.8 fL    MCH 26.8 (L) 27.0 - 33.0 pg    MCHC 33.0 (L) 33.7 - 35.3 g/dL    RDW 42.3 35.9 - 50.0 fL    Platelet Count 209 164 - 446 K/uL    MPV 12.2 9.0 - 12.9 fL    Neutrophils-Polys 59.70 44.00 - 72.00 %    Lymphocytes 29.20 22.00 - 41.00 %    Monocytes 7.20 0.00 - 13.40 %    Eosinophils 3.20 0.00 - 6.90 %     Basophils 0.60 0.00 - 1.80 %    Immature Granulocytes 0.10 0.00 - 0.90 %    Nucleated RBC 0.00 /100 WBC    Neutrophils (Absolute) 5.41 1.82 - 7.42 K/uL    Lymphs (Absolute) 2.64 1.00 - 4.80 K/uL    Monos (Absolute) 0.65 0.00 - 0.85 K/uL    Eos (Absolute) 0.29 0.00 - 0.51 K/uL    Baso (Absolute) 0.05 0.00 - 0.12 K/uL    Immature Granulocytes (abs) 0.01 0.00 - 0.11 K/uL    NRBC (Absolute) 0.00 K/uL   COMP METABOLIC PANEL   Result Value Ref Range    Sodium 136 135 - 145 mmol/L    Potassium 3.6 3.6 - 5.5 mmol/L    Chloride 101 96 - 112 mmol/L    Co2 24 20 - 33 mmol/L    Anion Gap 11.0 0.0 - 11.9    Glucose 129 (H) 65 - 99 mg/dL    Bun 15 8 - 22 mg/dL    Creatinine 1.25 0.50 - 1.40 mg/dL    Calcium 9.8 8.5 - 10.5 mg/dL    AST(SGOT) 13 12 - 45 U/L    ALT(SGPT) 10 2 - 50 U/L    Alkaline Phosphatase 101 (H) 30 - 99 U/L    Total Bilirubin 0.4 0.1 - 1.5 mg/dL    Albumin 4.3 3.2 - 4.9 g/dL    Total Protein 8.1 6.0 - 8.2 g/dL    Globulin 3.8 (H) 1.9 - 3.5 g/dL    A-G Ratio 1.1 g/dL   LIPASE   Result Value Ref Range    Lipase 40 11 - 82 U/L   TROPONIN   Result Value Ref Range    Troponin I 0.03 0.00 - 0.04 ng/mL   URINALYSIS,CULTURE IF INDICATED   Result Value Ref Range    Color Yellow     Character Clear     Specific Gravity 1.021 <1.035    Ph 7.5 5.0-8.0    Glucose Negative Negative mg/dL    Ketones Negative Negative mg/dL    Protein Negative Negative mg/dL    Bilirubin Negative Negative    Urobilinogen, Urine 0.2 Negative    Nitrite Negative Negative    Leukocyte Esterase Negative Negative    Occult Blood Negative Negative    Micro Urine Req see below     Culture Indicated No UA Culture   ESTIMATED GFR   Result Value Ref Range    GFR If African American >60 >60 mL/min/1.73 m 2    GFR If Non African American 58 (A) >60 mL/min/1.73 m 2   EKG (ER)   Result Value Ref Range    Report       [facility] Emergency Dept.    Test Date:  2017-07-19  Pt Name:    SAMUEL VILLASENOR        Department: ER  MRN:         [id number]                      Room:        04  Gender:     M                            Technician: [id number]  :        1950                   Requested By:LANI SPENCER  Order #:    [id number]                    Reading MD:    Measurements  Intervals                                Axis  Rate:       62                           P:          48  MO:         260                          QRS:        -34  QRSD:       116                          T:          141  QT:         432  QTc:        439    Interpretive Statements  SINUS RHYTHM  FIRST DEGREE AV BLOCK  NONSPECIFIC INTRAVENTRICULAR CONDUCTION DELAY  LVH WITH SECONDARY REPOLARIZATION ABNORMALITY  INFERIOR INFARCT, AGE INDETERMINATE  BASELINE WANDER IN LEAD(S) V5  Compared to ECG 2017 10:43:18  Myocardial infarct finding now present           RADIOLOGY  DX-CHEST-LIMITED (1 VIEW)   Final Result      No acute cardiopulmonary disease.      CT-THORACIC AORTA EVALUATION   Final Result      1.  Stable appearance of descending thoracic aneurysmal dilatation and atherosclerotic change.      2.  Stable appearance of infrarenal bilobed abdominal aortic aneurysm with maximum transverse dimension of 5.3 cm.      3.  No evidence of aneurysm leak or dissection.      4.  No acute pulmonary process.      5.  No acute inflammatory or obstructive process identified in the abdomen or pelvis.      6.  Horseshoe kidney again noted.            COURSE & MEDICAL DECISION MAKING  I have reviewed any medical record information, laboratory studies and radiographic results as noted above.  Differential diagnoses includes: Aortic dissection, leaking aortic aneurysm, pancreatitis, hepatitis, gastritis, PUD, perforated viscus    Encounter Summary: This is a 67 y.o. male with acute onset of left upper quadrant abdominal pain 3 months status post AAA repair. He comes in and initially appeared to be in quite amount of distress, a code aorta was called by nursing staff and he was  brought directly to the CT scan for imaging. By time he arrived in the room he appeared comfortable to me, his abdomen is benign and actually palpation improves his symptoms. Vital signs reveal some hypertension otherwise within normal limits. We'll check blood work, await the read of the CT scan, administered some morphine for discomfort and ultimately reevaluate ------ CT scan reveals no acute abnormalities, blood work is within normal limits. I reevaluated the patient, he feels better, the patient tells me that these have a long-standing history of abdominal pain and in fact has colonoscopy/endoscopy planned with the GI physician Monday, 5 days now. In the meantime I have given him strict return instructions, he is stable and appropriate for discharge at this time      DISPOSITION: Discharged home in stable condition      FINAL IMPRESSION  1. Left upper quadrant pain           This dictation was created using voice recognition software. The accuracy of the dictation is limited to the abilities of the software. I expect there may be some errors of grammar and possibly content. The nursing notes were reviewed and certain aspects of this information were incorporated into this note.    Electronically signed by: Chun Dsouza, 7/19/2017 7:42 PM

## 2017-07-26 NOTE — ADDENDUM NOTE
Encounter addended by: Elise Augustine R.N. on: 7/26/2017  9:49 AM<BR>     Documentation filed: Inpatient Document Flowsheet

## 2017-08-14 ENCOUNTER — OFFICE VISIT (OUTPATIENT)
Dept: CARDIOLOGY | Facility: MEDICAL CENTER | Age: 67
End: 2017-08-14
Payer: MEDICARE

## 2017-08-14 VITALS
SYSTOLIC BLOOD PRESSURE: 140 MMHG | WEIGHT: 169 LBS | DIASTOLIC BLOOD PRESSURE: 80 MMHG | HEIGHT: 72 IN | HEART RATE: 52 BPM | OXYGEN SATURATION: 96 % | BODY MASS INDEX: 22.89 KG/M2

## 2017-08-14 DIAGNOSIS — Z95.1 S/P CABG X 3: ICD-10-CM

## 2017-08-14 DIAGNOSIS — I25.2 OLD MI (MYOCARDIAL INFARCTION): ICD-10-CM

## 2017-08-14 DIAGNOSIS — I10 ESSENTIAL HYPERTENSION: ICD-10-CM

## 2017-08-14 DIAGNOSIS — I71.40 ABDOMINAL AORTIC ANEURYSM (AAA) WITHOUT RUPTURE (HCC): ICD-10-CM

## 2017-08-14 DIAGNOSIS — R94.31 NONSPECIFIC ABNORMAL ELECTROCARDIOGRAM (ECG) (EKG): ICD-10-CM

## 2017-08-14 PROCEDURE — 99204 OFFICE O/P NEW MOD 45 MIN: CPT | Performed by: INTERNAL MEDICINE

## 2017-08-14 RX ORDER — ENALAPRIL MALEATE 20 MG/1
20 TABLET ORAL DAILY
Qty: 90 TAB | Refills: 3 | Status: SHIPPED | OUTPATIENT
Start: 2017-08-14 | End: 2017-08-17 | Stop reason: SDUPTHER

## 2017-08-14 RX ORDER — LOSARTAN POTASSIUM 50 MG/1
TABLET ORAL
Refills: 0 | COMMUNITY
Start: 2017-07-17 | End: 2017-08-14

## 2017-08-14 RX ORDER — NIFEDIPINE 30 MG
TABLET, EXTENDED RELEASE ORAL
Refills: 0 | COMMUNITY
Start: 2017-07-17 | End: 2019-08-19

## 2017-08-14 RX ORDER — TAMSULOSIN HYDROCHLORIDE 0.4 MG/1
CAPSULE ORAL
Refills: 1 | COMMUNITY
Start: 2017-07-28 | End: 2017-10-31

## 2017-08-14 NOTE — Clinical Note
[facility] [address]   [address]  [place] [address]  Phone: [phone]  Fax: [phone]              Danny Antony  1950    Encounter Date: 8/14/2017    Dennis Guajardo M.D.    Thank you for the referral. I had the pleasure of seeing Danny Antony today in cardiology clinic. I've attached my visit note below. If you have any questions please feel free to give me a call anytime.      Brenda Bee MD, PhD, [facility]  Cardiology and Lipidology  [facility]                                                                    PROGRESS NOTE:  Cardiology Consult Note:    Brenda Bee  Date & Time note created:    8/14/2017   4:30 PM       Patient ID:  Name:             Danny Chavira     YOB: 1950  Age:                 67 y.o.  male   MRN:               [id number]                                                             Chief Complaint:   Chief Complaint   Patient presents with   • New Patient     discuss meds             History of Present Illness:   Danny Antony has recent  AAA operated 4/2017; CABG (3v) in 2009 in [place]; Requests to be followed with us; Still some Rt sided abd pain when cough or sneezes;   Former smoker; h/o HTN,  Denies DM, HLD    Review of Systems:  See above   Constitutional: Denies fevers, Denies weight changes  Eyes: Denies changes in vision, no eye pain  Ears/Nose/Throat/Mouth: Denies nasal congestion or sore throat   Cardiovascular: Denies chest pain or palpitations   Respiratory: Denies shortness of breath , Denies cough  Gastrointestinal/Hepatic:  abdominal pain, nausea, vomiting, diarrhea, constipation or GI bleeding   Genitourinary: Denies bladder dysfunction, dysuria or frequency  Musculoskeletal/Rheum: Denies  joint pain and swelling   Skin/Breast: Denies rash, denies breast lumps or discharge  Neurological: Denies headache,  confusion, memory loss or focal weakness/parasthesias  Psychiatric: denies mood disorder   Endocrine: denies hx of diabetes or thyroid dysfunction  Heme/Oncology/Lymph Nodes: Denies enlarged lymph nodes, denies bruising or known bleeding disorder  Allergic/Immunologic: Denies hx of allergies      All other systems were reviewed and are negative (AMA/CMS criteria)    Past Medical History:   Past Medical History   Diagnosis Date   • MI (myocardial infarction) (CMS-HCC) 2013         Past Surgical History:  Past Surgical History   Procedure Laterality Date   • Other cardiac surgery  2013     Triple bipass   • Abdominal aortic aneurysm N/A 4/11/2017     Procedure: ABDOMINAL AORTIC ANEURYSM;  Surgeon: Charissa Kerr M.D.;  Location: SURGERY [facility];  Service:        Hospital Medications:    Current outpatient prescriptions:   •  NIFEdipine (ADALAT CC) 30 MG CR tablet, TAKE ONE TABLET BY MOUTH DAILY FOR HIGH BLOOD PRESSURE - F/U WITH PCP FOR REFILLS, Disp: , Rfl: 0  •  tamsulosin (FLOMAX) 0.4 MG capsule, TAKE 1 CAPSULE BY MOUTH EACH DAY FOR URINARY FREQUENCY, Disp: , Rfl: 1  •  enalapril (VASOTEC) 20 MG tablet, Take 1 Tab by mouth every day., Disp: 90 Tab, Rfl: 3  •  metoprolol (LOPRESSOR) 100 MG Tab, Take 100 mg by mouth 2 times a day., Disp: , Rfl:   •  Hydrocodone-Acetaminophen (VICODIN) 5-300 MG Tab, Take  by mouth., Disp: , Rfl:   •  Polyethylene Glycol 3350 (MIRALAX PO), Take  by mouth., Disp: , Rfl:   •  aspirin (ASA) 325 MG Tab, Take 325 mg by mouth every 6 hours as needed for Mild Pain., Disp: , Rfl:     Current Outpatient Medications:  Current Outpatient Prescriptions   Medication Sig Dispense Refill   • NIFEdipine (ADALAT CC) 30 MG CR tablet TAKE ONE TABLET BY MOUTH DAILY FOR HIGH BLOOD PRESSURE - F/U WITH PCP FOR REFILLS  0   • tamsulosin (FLOMAX) 0.4 MG capsule TAKE 1 CAPSULE BY MOUTH EACH DAY FOR URINARY FREQUENCY  1   • enalapril (VASOTEC) 20 MG tablet Take 1 Tab by mouth every day. 90 Tab 3   •  "metoprolol (LOPRESSOR) 100 MG Tab Take 100 mg by mouth 2 times a day.     • Hydrocodone-Acetaminophen (VICODIN) 5-300 MG Tab Take  by mouth.     • Polyethylene Glycol 3350 (MIRALAX PO) Take  by mouth.     • aspirin (ASA) 325 MG Tab Take 325 mg by mouth every 6 hours as needed for Mild Pain.       No current facility-administered medications for this visit.         Medication Allergy/Sensitivities:  No Known Allergies    Family History:  No CAD;   History reviewed. No pertinent family history.    Social History:  Social History     Social History   • Marital Status:      Spouse Name: N/A   • Number of Children: N/A   • Years of Education: N/A     Occupational History   • Not on file.     Social History Main Topics   • Smoking status: Former Smoker -- 1.00 packs/day for 25 years     Types: Cigarettes     Quit date: 03/02/2009   • Smokeless tobacco: Never Used   • Alcohol Use: No      Comment: use to drink 2 beers a day quit 03/2009   • Drug Use: No   • Sexual Activity: Not Currently     Other Topics Concern   • Not on file     Social History Narrative         Physical Exam:  Vitals  Weight/BMI: Body mass index is 22.92 kg/(m^2).  Blood pressure 140/80, pulse 52, height 1.829 m (6' 0.01\"), weight 76.658 kg (169 lb), SpO2 96 %.  Filed Vitals:    08/14/17 1548   BP: 140/80   Pulse: 52   Height: 1.829 m (6' 0.01\")   Weight: 76.658 kg (169 lb)   SpO2: 96%     Oxygen Therapy:  Pulse Oximetry: 96 %  General Appearance:   Well developed, Well nourished, No acute distress, Non-toxic appearance.   HENT:  Normocephalic, Atraumatic, Oropharynx moist mucous membranes, Dentition: , Nose normal.    Eyes:  PERRLA, EOMI, Conjunctiva normal, No discharge.  Neck:  Normal range of motion, No cervical tenderness, Supple, No stridor, no JVD .  No thyromegaly.  No carotid bruit.  Cardiovascular:  Normal heart rate, Normal rhythm,  S1, S2, no S3,  S4; No gallops; No murmurs, No rubs, .   Extremitites with intact distal pulses, no " cyanosis, clubbing or edema.  No heaves, thrills, HJR;  Peripheral pulses: carotid 2+, brachial 2+, radial 2+, ulnar 2+, femoral 2+, popliteal 2+, PT 2+, DP 2+;  Lungs:  Respiratory effort is normal. Normal breath sounds, breath sounds clear to auscultation bilaterally,  no rales, no rhonchi, no wheezing.   Abdomen: Bowel sounds normal, Soft, No tenderness, No guarding, No rebound, No masses, No hepatosplenomegaly.  Skin: Warm, Dry, No erythema, No rash, no induration or crepitus.  Neurologic: Alert & oriented x 3, Normal motor function, Normal sensory function, No focal deficits noted, cranial nerves II through XII are normal,  normal gait.  Psychiatric: Affect normal, Judgment normal, Mood normal.      Data Review:     Records reviewed and summarized in current documentation    Lab Data Review:  Lab Results   Component Value Date/Time    SODIUM 136 07/19/2017 07:28 PM    POTASSIUM 3.6 07/19/2017 07:28 PM    CHLORIDE 101 07/19/2017 07:28 PM    CO2 24 07/19/2017 07:28 PM    GLUCOSE 129* 07/19/2017 07:28 PM    BUN 15 07/19/2017 07:28 PM    CREATININE 1.25 07/19/2017 07:28 PM      Lab Results   Component Value Date/Time    PT 13.8 04/11/2017 11:10 AM    INR 1.03 04/11/2017 11:10 AM      Lab Results   Component Value Date/Time    WBC 9.1 07/19/2017 07:28 PM    RBC 6.04 07/19/2017 07:28 PM    HEMOGLOBIN 16.2 07/19/2017 07:28 PM    HEMATOCRIT 49.1 07/19/2017 07:28 PM    MCV 81.3* 07/19/2017 07:28 PM    MCH 26.8* 07/19/2017 07:28 PM    MCHC 33.0* 07/19/2017 07:28 PM    MPV 12.2 07/19/2017 07:28 PM    NEUTROPHILS-POLYS 59.70 07/19/2017 07:28 PM    LYMPHOCYTES 29.20 07/19/2017 07:28 PM    MONOCYTES 7.20 07/19/2017 07:28 PM    EOSINOPHILS 3.20 07/19/2017 07:28 PM    BASOPHILS 0.60 07/19/2017 07:28 PM        Imaging/Procedures Review:    To my review shows:  7/19/2017 CT:1.  Stable appearance of descending thoracic aneurysmal dilatation and atherosclerotic change.  2.  Stable appearance of infrarenal bilobed abdominal aortic  aneurysm with maximum transverse dimension of 5.3 cm.  3.  No evidence of aneurysm leak or dissection.  4.  No acute pulmonary process.  5.  No acute inflammatory or obstructive process identified in the abdomen or pelvis.  6.  Horseshoe kidney again noted.    EKG To my review shows:SINUS RHYTHM   FIRST DEGREE AV BLOCK   NONSPECIFIC INTRAVENTRICULAR CONDUCTION DELAY   LVH WITH SECONDARY REPOLARIZATION ABNORMALITY   INFERIOR INFARCT, AGE INDETERMINATE     Assessment and Plan.   67 y.o. male has CAD, 3vCABG and aneurysm repair ; Will change his med regimen: increase Enalapril to 20 mg/d and d/c Losartan for now; Record home BP;     1. S/P CABG x 3  reviewed  - ECHOCARDIOGRAM COMP W/O CONT; Future  - CARDIAC STRESS TEST TREADMILL ONLY    2. Old MI's  Discussed risks and educated about the subject related matters      3. Abdominal aortic aneurysm (AAA) without rupture (CMS-[facility])  Reviewed and see above  - US-ABD VASCULAR DOPPLER LTD; Future    4. Nonspecific abnormal electrocardiogram (ECG) (EKG)    - ECHOCARDIOGRAM COMP W/O CONT; Future  - CARDIAC STRESS TEST TREADMILL ONLY      Return to clinic in  3  months        Dennis Guajardo M.D.  [facility]  [address]  [place] [address]  VIA Facsimile: [phone]

## 2017-08-14 NOTE — PROGRESS NOTES
Cardiology Consult Note:    Brenda Bee  Date & Time note created:    8/14/2017   4:30 PM       Patient ID:  Name:             Danny Chavira     YOB: 1950  Age:                 67 y.o.  male   MRN:               [id number]                                                             Chief Complaint:   Chief Complaint   Patient presents with   • New Patient     discuss meds             History of Present Illness:   Danny Antony has recent  AAA operated 4/2017; CABG (3v) in 2009 in [place]; Requests to be followed with us; Still some Rt sided abd pain when cough or sneezes;   Former smoker; h/o HTN,  Denies DM, HLD    Review of Systems:  See above   Constitutional: Denies fevers, Denies weight changes  Eyes: Denies changes in vision, no eye pain  Ears/Nose/Throat/Mouth: Denies nasal congestion or sore throat   Cardiovascular: Denies chest pain or palpitations   Respiratory: Denies shortness of breath , Denies cough  Gastrointestinal/Hepatic:  abdominal pain, nausea, vomiting, diarrhea, constipation or GI bleeding   Genitourinary: Denies bladder dysfunction, dysuria or frequency  Musculoskeletal/Rheum: Denies  joint pain and swelling   Skin/Breast: Denies rash, denies breast lumps or discharge  Neurological: Denies headache, confusion, memory loss or focal weakness/parasthesias  Psychiatric: denies mood disorder   Endocrine: denies hx of diabetes or thyroid dysfunction  Heme/Oncology/Lymph Nodes: Denies enlarged lymph nodes, denies bruising or known bleeding disorder  Allergic/Immunologic: Denies hx of allergies      All other systems were reviewed and are negative (AMA/CMS criteria)    Past Medical History:   Past Medical History   Diagnosis Date   • MI (myocardial infarction) (CMS-HCC) 2013         Past Surgical History:  Past Surgical History   Procedure Laterality Date   • Other cardiac surgery  2013     Triple bipass   • Abdominal aortic aneurysm N/A 4/11/2017     Procedure:  ABDOMINAL AORTIC ANEURYSM;  Surgeon: Charissa Kerr M.D.;  Location: SURGERY [facility];  Service:        Hospital Medications:    Current outpatient prescriptions:   •  NIFEdipine (ADALAT CC) 30 MG CR tablet, TAKE ONE TABLET BY MOUTH DAILY FOR HIGH BLOOD PRESSURE - F/U WITH PCP FOR REFILLS, Disp: , Rfl: 0  •  tamsulosin (FLOMAX) 0.4 MG capsule, TAKE 1 CAPSULE BY MOUTH EACH DAY FOR URINARY FREQUENCY, Disp: , Rfl: 1  •  enalapril (VASOTEC) 20 MG tablet, Take 1 Tab by mouth every day., Disp: 90 Tab, Rfl: 3  •  metoprolol (LOPRESSOR) 100 MG Tab, Take 100 mg by mouth 2 times a day., Disp: , Rfl:   •  Hydrocodone-Acetaminophen (VICODIN) 5-300 MG Tab, Take  by mouth., Disp: , Rfl:   •  Polyethylene Glycol 3350 (MIRALAX PO), Take  by mouth., Disp: , Rfl:   •  aspirin (ASA) 325 MG Tab, Take 325 mg by mouth every 6 hours as needed for Mild Pain., Disp: , Rfl:     Current Outpatient Medications:  Current Outpatient Prescriptions   Medication Sig Dispense Refill   • NIFEdipine (ADALAT CC) 30 MG CR tablet TAKE ONE TABLET BY MOUTH DAILY FOR HIGH BLOOD PRESSURE - F/U WITH PCP FOR REFILLS  0   • tamsulosin (FLOMAX) 0.4 MG capsule TAKE 1 CAPSULE BY MOUTH EACH DAY FOR URINARY FREQUENCY  1   • enalapril (VASOTEC) 20 MG tablet Take 1 Tab by mouth every day. 90 Tab 3   • metoprolol (LOPRESSOR) 100 MG Tab Take 100 mg by mouth 2 times a day.     • Hydrocodone-Acetaminophen (VICODIN) 5-300 MG Tab Take  by mouth.     • Polyethylene Glycol 3350 (MIRALAX PO) Take  by mouth.     • aspirin (ASA) 325 MG Tab Take 325 mg by mouth every 6 hours as needed for Mild Pain.       No current facility-administered medications for this visit.         Medication Allergy/Sensitivities:  No Known Allergies    Family History:  No CAD;   History reviewed. No pertinent family history.    Social History:  Social History     Social History   • Marital Status:      Spouse Name: N/A   • Number of Children: N/A   • Years of Education: N/A  "    Occupational History   • Not on file.     Social History Main Topics   • Smoking status: Former Smoker -- 1.00 packs/day for 25 years     Types: Cigarettes     Quit date: 03/02/2009   • Smokeless tobacco: Never Used   • Alcohol Use: No      Comment: use to drink 2 beers a day quit 03/2009   • Drug Use: No   • Sexual Activity: Not Currently     Other Topics Concern   • Not on file     Social History Narrative         Physical Exam:  Vitals  Weight/BMI: Body mass index is 22.92 kg/(m^2).  Blood pressure 140/80, pulse 52, height 1.829 m (6' 0.01\"), weight 76.658 kg (169 lb), SpO2 96 %.  Filed Vitals:    08/14/17 1548   BP: 140/80   Pulse: 52   Height: 1.829 m (6' 0.01\")   Weight: 76.658 kg (169 lb)   SpO2: 96%     Oxygen Therapy:  Pulse Oximetry: 96 %  General Appearance:   Well developed, Well nourished, No acute distress, Non-toxic appearance.   HENT:  Normocephalic, Atraumatic, Oropharynx moist mucous membranes, Dentition: , Nose normal.    Eyes:  PERRLA, EOMI, Conjunctiva normal, No discharge.  Neck:  Normal range of motion, No cervical tenderness, Supple, No stridor, no JVD .  No thyromegaly.  No carotid bruit.  Cardiovascular:  Normal heart rate, Normal rhythm,  S1, S2, no S3,  S4; No gallops; No murmurs, No rubs, .   Extremitites with intact distal pulses, no cyanosis, clubbing or edema.  No heaves, thrills, HJR;  Peripheral pulses: carotid 2+, brachial 2+, radial 2+, ulnar 2+, femoral 2+, popliteal 2+, PT 2+, DP 2+;  Lungs:  Respiratory effort is normal. Normal breath sounds, breath sounds clear to auscultation bilaterally,  no rales, no rhonchi, no wheezing.   Abdomen: Bowel sounds normal, Soft, No tenderness, No guarding, No rebound, No masses, No hepatosplenomegaly.  Skin: Warm, Dry, No erythema, No rash, no induration or crepitus.  Neurologic: Alert & oriented x 3, Normal motor function, Normal sensory function, No focal deficits noted, cranial nerves II through XII are normal,  normal " gait.  Psychiatric: Affect normal, Judgment normal, Mood normal.      Data Review:     Records reviewed and summarized in current documentation    Lab Data Review:  Lab Results   Component Value Date/Time    SODIUM 136 07/19/2017 07:28 PM    POTASSIUM 3.6 07/19/2017 07:28 PM    CHLORIDE 101 07/19/2017 07:28 PM    CO2 24 07/19/2017 07:28 PM    GLUCOSE 129* 07/19/2017 07:28 PM    BUN 15 07/19/2017 07:28 PM    CREATININE 1.25 07/19/2017 07:28 PM      Lab Results   Component Value Date/Time    PT 13.8 04/11/2017 11:10 AM    INR 1.03 04/11/2017 11:10 AM      Lab Results   Component Value Date/Time    WBC 9.1 07/19/2017 07:28 PM    RBC 6.04 07/19/2017 07:28 PM    HEMOGLOBIN 16.2 07/19/2017 07:28 PM    HEMATOCRIT 49.1 07/19/2017 07:28 PM    MCV 81.3* 07/19/2017 07:28 PM    MCH 26.8* 07/19/2017 07:28 PM    MCHC 33.0* 07/19/2017 07:28 PM    MPV 12.2 07/19/2017 07:28 PM    NEUTROPHILS-POLYS 59.70 07/19/2017 07:28 PM    LYMPHOCYTES 29.20 07/19/2017 07:28 PM    MONOCYTES 7.20 07/19/2017 07:28 PM    EOSINOPHILS 3.20 07/19/2017 07:28 PM    BASOPHILS 0.60 07/19/2017 07:28 PM        Imaging/Procedures Review:    To my review shows:  7/19/2017 CT:1.  Stable appearance of descending thoracic aneurysmal dilatation and atherosclerotic change.  2.  Stable appearance of infrarenal bilobed abdominal aortic aneurysm with maximum transverse dimension of 5.3 cm.  3.  No evidence of aneurysm leak or dissection.  4.  No acute pulmonary process.  5.  No acute inflammatory or obstructive process identified in the abdomen or pelvis.  6.  Horseshoe kidney again noted.    EKG To my review shows:SINUS RHYTHM   FIRST DEGREE AV BLOCK   NONSPECIFIC INTRAVENTRICULAR CONDUCTION DELAY   LVH WITH SECONDARY REPOLARIZATION ABNORMALITY   INFERIOR INFARCT, AGE INDETERMINATE     Assessment and Plan.   67 y.o. male has CAD, 3vCABG and aneurysm repair ; Will change his med regimen: increase Enalapril to 20 mg/d and d/c Losartan for now; Record home BP;     1. S/P  CABG x 3  reviewed  - ECHOCARDIOGRAM COMP W/O CONT; Future  - CARDIAC STRESS TEST TREADMILL ONLY    2. Old MI's  Discussed risks and educated about the subject related matters      3. Abdominal aortic aneurysm (AAA) without rupture (CMS-HCC)  Reviewed and see above  - US-ABD VASCULAR DOPPLER LTD; Future    4. Nonspecific abnormal electrocardiogram (ECG) (EKG)    - ECHOCARDIOGRAM COMP W/O CONT; Future  - CARDIAC STRESS TEST TREADMILL ONLY      Return to clinic in  3  months     Home

## 2017-08-14 NOTE — MR AVS SNAPSHOT
"        Danny Antony   2017 3:15 PM   Office Visit   MRN: [id number]    Department:  Heart [facility]   Dept Phone:  [phone]    Description:  Male : 1950   Provider:  Brenda Bee MD,[facility]           Reason for Visit     New Patient discuss meds      Allergies as of 2017     No Known Allergies      You were diagnosed with     S/P CABG x 3   [373661]    [place], [place]    Abdominal aortic aneurysm (AAA) without rupture (CMS-HCC)   [3238288]   Repaired 2017    Nonspecific abnormal electrocardiogram (ECG) (EKG)   [794.31.ICD-9-CM]       Old MI (myocardial infarction)   [117965]       Essential hypertension   [1724877]         Vital Signs     Blood Pressure Pulse Height Weight Body Mass Index Oxygen Saturation    140/80 mmHg 52 1.829 m (6' 0.01\") 76.658 kg (169 lb) 22.92 kg/m2 96%    Smoking Status                   Former Smoker           Basic Information     Date Of Birth Sex Race Ethnicity Preferred Language Language for Written Material    1950 Male White  Origin (Citizen of [place], etc) English Citizen of [place]      Your appointments     Aug 15, 2017 10:45 AM   Treadmill with [facility]   [facility] (--)    [address], [address]  [place] [id number] [phone]            Aug 28, 2017  3:15 PM   ECHO with ECHO [facility] EXAM 10   [facility]CHOCARDIOLOGY [facility] ([facility])    [address] [facility]  [place] [address]   [phone]            Sep 05, 2017  3:40 PM   PREVIOUS PATIENT with YUNG Lema   [facility]    [address]  [address]  [place] [address] [phone]              Problem List              ICD-10-CM Priority Class Noted - Resolved    Aortic aneurysm without rupture (CMS-HCC) I71.9   2017 - Present    MI (myocardial infarction) (CMS-HCC) I21.3   Unknown - Present      Health Maintenance        Date Due Completion Dates    IMM " DTaP/Tdap/Td Vaccine (1 - Tdap) 3/6/1971 ---    COLONOSCOPY 3/6/2002 ---    IMM ZOSTER VACCINE 3/6/2012 ---    IMM PNEUMOCOCCAL 65+ (ADULT) LOW/MEDIUM RISK SERIES (1 of 2 - PCV13) 3/6/2017 ---    IMM INFLUENZA (1) 9/1/2017 4/18/2017            Current Immunizations     Influenza Vaccine Adult HD 4/18/2017  5:20 PM      Below and/or attached are the medications your provider expects you to take. Review all of your home medications and newly ordered medications with your provider and/or pharmacist. Follow medication instructions as directed by your provider and/or pharmacist. Please keep your medication list with you and share with your provider. Update the information when medications are discontinued, doses are changed, or new medications (including over-the-counter products) are added; and carry medication information at all times in the event of emergency situations     Allergies:  No Known Allergies          Medications  Valid as of: August 14, 2017 -  4:38 PM    Generic Name Brand Name Tablet Size Instructions for use    Aspirin (Tab)  MG Take 325 mg by mouth every 6 hours as needed for Mild Pain.        Enalapril Maleate (Tab) VASOTEC 20 MG Take 1 Tab by mouth every day.        Hydrocodone-Acetaminophen (Tab) Hydrocodone-Acetaminophen 5-300 MG Take  by mouth.        Metoprolol Tartrate (Tab) LOPRESSOR 100 MG Take 100 mg by mouth 2 times a day.        NIFEdipine (TABLET SR 24 HR) ADALAT CC 30 MG TAKE ONE TABLET BY MOUTH DAILY FOR HIGH BLOOD PRESSURE - F/U WITH PCP FOR REFILLS        Polyethylene Glycol 3350   Take  by mouth.        Tamsulosin HCl (Cap) FLOMAX 0.4 MG TAKE 1 CAPSULE BY MOUTH EACH DAY FOR URINARY FREQUENCY        .                 Medicines prescribed today were sent to:     [facility]  [place] [address] [place] [address] [facility] [place] [address]    Phone: [phone] Fax: [phone]    Open 24 Hours?: No      Medication refill instructions:       If your  prescription bottle indicates you have medication refills left, it is not necessary to call your provider’s office. Please contact your pharmacy and they will refill your medication.    If your prescription bottle indicates you do not have any refills left, you may request refills at any time through one of the following ways: The online Fivejack system (except Urgent Care), by calling your provider’s office, or by asking your pharmacy to contact your provider’s office with a refill request. Medication refills are processed only during regular business hours and may not be available until the next business day. Your provider may request additional information or to have a follow-up visit with you prior to refilling your medication.   *Please Note: Medication refills are assigned a new Rx number when refilled electronically. Your pharmacy may indicate that no refills were authorized even though a new prescription for the same medication is available at the pharmacy. Please request the medicine by name with the pharmacy before contacting your provider for a refill.        Your To Do List     Future Labs/Procedures Complete By Expires    ECHOCARDIOGRAM COMP W/O CONT  As directed 8/14/2018         Fivejack Access Code: [email or web address]  Expires: 9/4/2017  4:15 AM    Fivejack  A secure, online tool to manage your health information     [employer]’s Fivejack® is a secure, online tool that connects you to your personalized health information from the privacy of your home -- day or night - making it very easy for you to manage your healthcare. Once the activation process is completed, you can even access your medical information using the Fivejack aashish, which is available for free in the Apple Aashish store or Google Play store.     Fivejack provides the following levels of access (as shown below):   My Chart Features   [facility] Doctor [facility]  Specialists [facility]  Urgent  Care Non-[facility]  Primary Care  Doctor   Email  your healthcare team securely and privately 24/7 X X X    Manage appointments: schedule your next appointment; view details of past/upcoming appointments X      Request prescription refills. X      View recent personal medical records, including lab and immunizations X X X X   View health record, including health history, allergies, medications X X X X   Read reports about your outpatient visits, procedures, consult and ER notes X X X X   See your discharge summary, which is a recap of your hospital and/or ER visit that includes your diagnosis, lab results, and care plan. X X       How to register for NetVision:  1. Go to  [email or web address].  2. Click on the Sign Up Now box, which takes you to the New Member Sign Up page. You will need to provide the following information:  a. Enter your NetVision Access Code exactly as it appears at the top of this page. (You will not need to use this code after you’ve completed the sign-up process. If you do not sign up before the expiration date, you must request a new code.)   b. Enter your date of birth.   c. Enter your home email address.   d. Click Submit, and follow the next screen’s instructions.  3. Create a NetVision ID. This will be your NetVision login ID and cannot be changed, so think of one that is secure and easy to remember.  4. Create a NetVision password. You can change your password at any time.  5. Enter your Password Reset Question and Answer. This can be used at a later time if you forget your password.   6. Enter your e-mail address. This allows you to receive e-mail notifications when new information is available in NetVision.  7. Click Sign Up. You can now view your health information.    For assistance activating your NetVision account, call [phone]

## 2017-08-15 ENCOUNTER — TELEPHONE (OUTPATIENT)
Dept: CARDIOLOGY | Facility: MEDICAL CENTER | Age: 67
End: 2017-08-15

## 2017-08-15 ENCOUNTER — NON-PROVIDER VISIT (OUTPATIENT)
Dept: CARDIOLOGY | Facility: MEDICAL CENTER | Age: 67
End: 2017-08-15
Payer: MEDICARE

## 2017-08-15 DIAGNOSIS — Z95.1 S/P CABG X 3: ICD-10-CM

## 2017-08-15 DIAGNOSIS — R94.31 NONSPECIFIC ABNORMAL ELECTROCARDIOGRAM (ECG) (EKG): ICD-10-CM

## 2017-08-15 PROBLEM — I71.40 ABDOMINAL AORTIC ANEURYSM (AAA) WITHOUT RUPTURE (HCC): Status: ACTIVE | Noted: 2017-08-15

## 2017-08-15 NOTE — TELEPHONE ENCOUNTER
Order placed        Message  Received: Today       Brenda Bee MD,[facility]  Kristen Whitney R.N.       Caller: Unspecified (Today, 11:23 AM)                     OK            Previous Messages       ----- Message -----      From: Kristen Whitney R.N.      Sent: 8/15/2017  11:29 AM        To: Talisha Holliday, Brenda Bee MD,[facility]     ----- Message from Kristen Whitney R.N. sent at 8/15/2017 11:29 AM PDT -----   Dr. Bee,   PT arrived for treadmills test. Baseline EKG abnormal and BP very high 168/90 (See EKG in media). DR. Hernandez ADD today is recommending an echo stress instead.   Ok to order?   Kristen                         Call Documentation      Kristen Whitney R.N. at 8/15/2017 11:23 AM      Status: Signed         Expand All Collapse All    PT arrived for treadmill stress test with daughter in law. /90, HR 70, O2 sat 99%. PT's baseline EKG is abnormal. Pt denies chest pain. Dr. Hernandez reviewed EKG and vitals, he is recommending for PT to have an Echo stress and cancel treadmill.  PT and daughter in law agreeable with plan.    To Dr. Leon to ok Echo stress.  CC. To LITZY BULL RN                           Routing History      Priority Sent On From To Message Type      8/15/2017 12:51 PM Brenda Bee MD,[facility] Krsiten Whitney R.N. Patient Call      8/15/2017 11:29 AM MABEL Sharma Patient Call      Brenda Bee MD,[facility]       Comment: Dr. Bee,   PT arrived for treadmills test. Baseline EKG abnormal and BP very high 168/90 (See EKG in media). DR. Hernandez ADD today is recommending an echo stress instead.   Ok to order?   Kristen        Created by      Kristen Whitney R.N. on 08/15/2017 11:23 AM            PT arrived for treadmill stress test with daughter in law. /90, HR 70, O2 sat 99%. PT's baseline EKG is abnormal. Pt denies chest pain. Dr. Hernandez reviewed EKG and vitals, he is recommending for PT to have an Echo stress and  cancel treadmill.  PT and daughter in law agreeable with plan.   To Dr. Leon to ok Echo stress.  CC. To LITZY BULL RN

## 2017-08-15 NOTE — MR AVS SNAPSHOT
Danny De La CruzCait   8/15/2017 10:45 AM   Appointment   MRN: [id number]    Department:  [facility]   Dept Phone:  [phone]    Description:  Male : 1950   Provider:  CORY B           Allergies as of 8/15/2017     No Known Allergies      Vital Signs     Smoking Status                   Former Smoker           Basic Information     Date Of Birth Sex Race Ethnicity Preferred Language Language for Written Material    1950 Male White  Origin (Slovak,Welsh,Italian,Comoran, etc) English Slovak      Your appointments     Aug 28, 2017  3:15 PM   ECHO with [facility]   [facility]    [address] [facility] [address] [facility]  [place] [address]   [phone]            Sep 05, 2017  3:40 PM   PREVIOUS PATIENT with YUNG Lema   [facility]    [address]  [address]  [place] [id number] [phone]              Problem List              ICD-10-CM Priority Class Noted - Resolved    Aortic aneurysm without rupture (CMS-HCC) I71.9   2017 - Present    MI (myocardial infarction) (CMS-HCC) I21.3   Unknown - Present      Health Maintenance        Date Due Completion Dates    IMM DTaP/Tdap/Td Vaccine (1 - Tdap) 3/6/1971 ---    COLONOSCOPY 3/6/2002 ---    IMM ZOSTER VACCINE 3/6/2012 ---    IMM PNEUMOCOCCAL 65+ (ADULT) LOW/MEDIUM RISK SERIES (1 of 2 - PCV13) 3/6/2017 ---    IMM INFLUENZA (1) 2017            Current Immunizations     Influenza Vaccine Adult HD 2017  5:20 PM      Below and/or attached are the medications your provider expects you to take. Review all of your home medications and newly ordered medications with your provider and/or pharmacist. Follow medication instructions as directed by your provider and/or pharmacist. Please keep your medication list with you and share with your provider. Update the information when medications are discontinued, doses are changed,  or new medications (including over-the-counter products) are added; and carry medication information at all times in the event of emergency situations     Allergies:  No Known Allergies          Medications  Valid as of: August 15, 2017 -  1:09 PM    Generic Name Brand Name Tablet Size Instructions for use    Aspirin (Tab)  MG Take 325 mg by mouth every 6 hours as needed for Mild Pain.        Enalapril Maleate (Tab) VASOTEC 20 MG Take 1 Tab by mouth every day.        Hydrocodone-Acetaminophen (Tab) Hydrocodone-Acetaminophen 5-300 MG Take  by mouth.        Metoprolol Tartrate (Tab) LOPRESSOR 100 MG Take 100 mg by mouth 2 times a day.        NIFEdipine (TABLET SR 24 HR) ADALAT CC 30 MG TAKE ONE TABLET BY MOUTH DAILY FOR HIGH BLOOD PRESSURE - F/U WITH PCP FOR REFILLS        Polyethylene Glycol 3350   Take  by mouth.        Tamsulosin HCl (Cap) FLOMAX 0.4 MG TAKE 1 CAPSULE BY MOUTH EACH DAY FOR URINARY FREQUENCY        .                 Medicines prescribed today were sent to:     [address] ([place]), [place]  [address] [address] [address]    Phone: [phone] Fax: [phone]    Open 24 Hours?: No      Medication refill instructions:       If your prescription bottle indicates you have medication refills left, it is not necessary to call your provider’s office. Please contact your pharmacy and they will refill your medication.    If your prescription bottle indicates you do not have any refills left, you may request refills at any time through one of the following ways: The online PROTEGO system (except Urgent Care), by calling your provider’s office, or by asking your pharmacy to contact your provider’s office with a refill request. Medication refills are processed only during regular business hours and may not be available until the next business day. Your provider may request additional information or to have a follow-up visit with you prior to refilling your  medication.   *Please Note: Medication refills are assigned a new Rx number when refilled electronically. Your pharmacy may indicate that no refills were authorized even though a new prescription for the same medication is available at the pharmacy. Please request the medicine by name with the pharmacy before contacting your provider for a refill.           Blackberry Access Code: [email or web address]  Expires: 9/4/2017  4:15 AM    Blackberry  A secure, online tool to manage your health information     [employer]’s Blackberry® is a secure, online tool that connects you to your personalized health information from the privacy of your home -- day or night - making it very easy for you to manage your healthcare. Once the activation process is completed, you can even access your medical information using the Blackberry aashish, which is available for free in the Apple Aashish store or Google Play store.     Blackberry provides the following levels of access (as shown below):   My Chart Features   [facility] Doctor [facility]  Specialists [facility]  Urgent  Care Non-[facility]  Primary Care  Doctor   Email your healthcare team securely and privately 24/7 X X X    Manage appointments: schedule your next appointment; view details of past/upcoming appointments X      Request prescription refills. X      View recent personal medical records, including lab and immunizations X X X X   View health record, including health history, allergies, medications X X X X   Read reports about your outpatient visits, procedures, consult and ER notes X X X X   See your discharge summary, which is a recap of your hospital and/or ER visit that includes your diagnosis, lab results, and care plan. X X       How to register for Blackberry:  1. Go to  [email or web address].  2. Click on the Sign Up Now box, which takes you to the New Member Sign Up page. You will need to provide the following information:  a. Enter your Blackberry Access Code exactly as it appears at the  top of this page. (You will not need to use this code after you’ve completed the sign-up process. If you do not sign up before the expiration date, you must request a new code.)   b. Enter your date of birth.   c. Enter your home email address.   d. Click Submit, and follow the next screen’s instructions.  3. Create a MyBeautyCompare ID. This will be your MyBeautyCompare login ID and cannot be changed, so think of one that is secure and easy to remember.  4. Create a MyBeautyCompare password. You can change your password at any time.  5. Enter your Password Reset Question and Answer. This can be used at a later time if you forget your password.   6. Enter your e-mail address. This allows you to receive e-mail notifications when new information is available in MyBeautyCompare.  7. Click Sign Up. You can now view your health information.    For assistance activating your MyBeautyCompare account, call [phone]

## 2017-08-17 DIAGNOSIS — Z95.1 S/P CABG X 3: ICD-10-CM

## 2017-08-17 DIAGNOSIS — I10 ESSENTIAL HYPERTENSION: ICD-10-CM

## 2017-08-24 RX ORDER — ENALAPRIL MALEATE 20 MG/1
20 TABLET ORAL DAILY
Qty: 90 TAB | Refills: 3 | OUTPATIENT
Start: 2017-08-24 | End: 2017-09-20

## 2017-08-28 ENCOUNTER — HOSPITAL ENCOUNTER (OUTPATIENT)
Dept: CARDIOLOGY | Facility: MEDICAL CENTER | Age: 67
End: 2017-08-28
Attending: INTERNAL MEDICINE
Payer: MEDICARE

## 2017-08-28 DIAGNOSIS — R94.31 NONSPECIFIC ABNORMAL ELECTROCARDIOGRAM (ECG) (EKG): ICD-10-CM

## 2017-08-28 DIAGNOSIS — Z95.1 S/P CABG X 3: ICD-10-CM

## 2017-08-28 PROCEDURE — 93306 TTE W/DOPPLER COMPLETE: CPT

## 2017-08-28 PROCEDURE — 93306 TTE W/DOPPLER COMPLETE: CPT | Mod: 26 | Performed by: INTERNAL MEDICINE

## 2017-08-29 LAB
LV EJECT FRACT  99904: 50
LV EJECT FRACT MOD 2C 99903: 52.27
LV EJECT FRACT MOD 4C 99902: 49.8
LV EJECT FRACT MOD BP 99901: 48.82

## 2017-09-07 ENCOUNTER — HOSPITAL ENCOUNTER (OUTPATIENT)
Dept: LAB | Facility: MEDICAL CENTER | Age: 67
End: 2017-09-07
Attending: INTERNAL MEDICINE
Payer: MEDICARE

## 2017-09-07 LAB
CHOLEST SERPL-MCNC: 163 MG/DL (ref 100–199)
FASTING STATUS PATIENT QL REPORTED: NORMAL
HDLC SERPL-MCNC: 30 MG/DL
LDLC SERPL CALC-MCNC: 93 MG/DL
TRIGL SERPL-MCNC: 198 MG/DL (ref 0–149)

## 2017-09-07 PROCEDURE — 36415 COLL VENOUS BLD VENIPUNCTURE: CPT

## 2017-09-07 PROCEDURE — 80061 LIPID PANEL: CPT

## 2017-09-12 ENCOUNTER — HOSPITAL ENCOUNTER (OUTPATIENT)
Dept: CARDIOLOGY | Facility: MEDICAL CENTER | Age: 67
End: 2017-09-12
Attending: INTERNAL MEDICINE
Payer: MEDICARE

## 2017-09-12 DIAGNOSIS — Z95.1 S/P CABG X 3: ICD-10-CM

## 2017-09-12 DIAGNOSIS — R94.31 NONSPECIFIC ABNORMAL ELECTROCARDIOGRAM (ECG) (EKG): ICD-10-CM

## 2017-09-14 ENCOUNTER — HOSPITAL ENCOUNTER (OUTPATIENT)
Dept: CARDIOLOGY | Facility: MEDICAL CENTER | Age: 67
End: 2017-09-14
Attending: INTERNAL MEDICINE
Payer: MEDICARE

## 2017-09-14 DIAGNOSIS — R94.31 NONSPECIFIC ABNORMAL ELECTROCARDIOGRAM (ECG) (EKG): ICD-10-CM

## 2017-09-14 DIAGNOSIS — Z95.1 S/P CABG X 3: ICD-10-CM

## 2017-09-14 PROCEDURE — 93350 STRESS TTE ONLY: CPT | Mod: 26 | Performed by: INTERNAL MEDICINE

## 2017-09-14 PROCEDURE — 93018 CV STRESS TEST I&R ONLY: CPT | Performed by: INTERNAL MEDICINE

## 2017-09-14 PROCEDURE — 93350 STRESS TTE ONLY: CPT

## 2017-09-14 PROCEDURE — 93017 CV STRESS TEST TRACING ONLY: CPT

## 2017-09-20 ENCOUNTER — OFFICE VISIT (OUTPATIENT)
Dept: CARDIOLOGY | Facility: MEDICAL CENTER | Age: 67
End: 2017-09-20
Payer: MEDICARE

## 2017-09-20 VITALS
HEIGHT: 72 IN | BODY MASS INDEX: 23.03 KG/M2 | OXYGEN SATURATION: 95 % | SYSTOLIC BLOOD PRESSURE: 160 MMHG | DIASTOLIC BLOOD PRESSURE: 90 MMHG | WEIGHT: 170 LBS | HEART RATE: 51 BPM

## 2017-09-20 DIAGNOSIS — I10 ESSENTIAL HYPERTENSION: ICD-10-CM

## 2017-09-20 DIAGNOSIS — I71.40 ABDOMINAL AORTIC ANEURYSM (AAA) WITHOUT RUPTURE (HCC): ICD-10-CM

## 2017-09-20 DIAGNOSIS — I25.2 OLD MI (MYOCARDIAL INFARCTION): ICD-10-CM

## 2017-09-20 DIAGNOSIS — Z95.1 S/P CABG X 3: ICD-10-CM

## 2017-09-20 PROCEDURE — 99214 OFFICE O/P EST MOD 30 MIN: CPT | Performed by: INTERNAL MEDICINE

## 2017-09-20 RX ORDER — LOSARTAN POTASSIUM 50 MG/1
TABLET ORAL
Refills: 0 | COMMUNITY
Start: 2017-07-17 | End: 2017-09-20

## 2017-09-20 NOTE — PROGRESS NOTES
Chief Complaint   Patient presents with   • Follow-Up     review meds high b/p, felt dizzy on enalapril 20 mg cut dose in half.         This patient is an established male who is here today to discuss:  Does not like Enalopril but Losartan; Will increas to 100 mg/d    Patient Active Problem List    Diagnosis Date Noted   • Abdominal aortic aneurysm (AAA) without rupture (CMS-HCC) 08/15/2017   • Aortic aneurysm without rupture (CMS-HCC) 04/11/2017   • MI (myocardial infarction) (CMS-HCC)        Past Medical History:   Diagnosis Date   • MI (myocardial infarction) (CMS-HCC) 2013     Past Surgical History:   Procedure Laterality Date   • ABDOMINAL AORTIC ANEURYSM N/A 4/11/2017    Procedure: ABDOMINAL AORTIC ANEURYSM;  Surgeon: Charissa Kerr M.D.;  Location: SURGERY [facility];  Service:    • OTHER CARDIAC SURGERY  2013    Triple bipass     Social History     Social History   • Marital status:      Spouse name: N/A   • Number of children: N/A   • Years of education: N/A     Social History Main Topics   • Smoking status: Former Smoker     Packs/day: 1.00     Years: 25.00     Types: Cigarettes     Quit date: 3/2/2009   • Smokeless tobacco: Never Used   • Alcohol use No      Comment: use to drink 2 beers a day quit 03/2009   • Drug use: No   • Sexual activity: Not Currently     Other Topics Concern   • Not on file     Social History Narrative   • No narrative on file     History reviewed. No pertinent family history.    Current Outpatient Prescriptions   Medication Sig Dispense Refill   • NIFEdipine (ADALAT CC) 30 MG CR tablet TAKE ONE TABLET BY MOUTH DAILY FOR HIGH BLOOD PRESSURE - F/U WITH PCP FOR REFILLS  0   • tamsulosin (FLOMAX) 0.4 MG capsule TAKE 1 CAPSULE BY MOUTH EACH DAY FOR URINARY FREQUENCY  1   • metoprolol (LOPRESSOR) 100 MG Tab Take 100 mg by mouth 2 times a day.     • Hydrocodone-Acetaminophen (VICODIN) 5-300 MG Tab Take  by mouth.     • Polyethylene Glycol 3350 (MIRALAX PO) Take  by mouth.      • aspirin (ASA) 325 MG Tab Take 325 mg by mouth every 6 hours as needed for Mild Pain.       No current facility-administered medications for this visit.      Review of patient's allergies indicates no known allergies.    Review of Systems:   Headache;   Constitutional: Denies fevers, Denies weight changes  Eyes: Denies changes in vision, no eye pain  Ears/Nose/Throat/Mouth: Denies nasal congestion or sore throat   Cardiovascular: Denies chest pain or palpitations   Respiratory: Denies shortness of breath , Denies cough  Gastrointestinal/Hepatic: Denies abdominal pain, nausea, vomiting, diarrhea, constipation or GI bleeding   Genitourinary: Denies bladder dysfunction, dysuria or frequency  Musculoskeletal/Rheum: Denies  joint pain and swelling   Skin/Breast: Denies rash, denies breast lumps or discharge  Neurological: Denies headache, confusion, memory loss or focal weakness/parasthesias  Psychiatric: denies mood disorder   Endocrine: denies hx of diabetes or thyroid dysfunction  Heme/Oncology/Lymph Nodes: Denies enlarged lymph nodes, denies brusing or known bleeding disorder  Allergic/Immunologic: Denies hx of allergies      All other systems were reviewed and are negative (AMA/CMS criteria)      Blood pressure 160/90, pulse (!) 51, height 1.829 m (6'), weight 77.1 kg (170 lb), SpO2 95 %.  General Appearance:   Well developed, Well nourished, No acute distress, Non-toxic appearance.   HENT:  Normocephalic, Atraumatic, Oropharynx moist mucous membranes, Dentition: , Nose normal.    Eyes:  PERRLA, EOMI, Conjunctiva normal, No discharge.  Neck:  Normal range of motion, No cervical tenderness, Supple, No stridor, no JVD .  No thyromegaly.  No carotid bruit.  Cardiovascular:  Normal heart rate, Normal rhythm,  S1, S2, no S3,  S4; No gallops; No murmurs, No rubs, .   Extremitites with intact distal pulses, no cyanosis, clubbing or edema.  No heaves, thrills, HJR;  Peripheral pulses: carotid 2+, brachial 2+, radial 2+,  ulnar 2+, femoral 2+, popliteal 2+, PT 2+, DP 2+;  Lungs:  Respiratory effort is normal. Normal breath sounds, breath sounds clear to auscultation bilaterally,  no rales, no rhonchi, no wheezing.   Abdomen: Bowel sounds normal, Soft, No tenderness, No guarding, No rebound, No masses, No hepatosplenomegaly.  Skin: Warm, Dry, No erythema, No rash, no induration or crepitus.  Neurologic: Alert & oriented x 3, Normal motor function, Normal sensory function, No focal deficits noted, cranial nerves II through XII are normal,  normal gait.  Psychiatric: Affect normal, Judgment normal, Mood normal.    7/19/2017 CT thorx/abd: 1.  Stable appearance of descending thoracic aneurysmal dilatation and atherosclerotic change.  2.  Stable appearance of infrarenal bilobed abdominal aortic aneurysm with maximum transverse dimension of 5.3 cm.  3.  No evidence of aneurysm leak or dissection.  4.  No acute pulmonary process.  5.  No acute inflammatory or obstructive process identified in the abdomen or pelvis.  6.  Horseshoe kidney again noted.    Assessment and Plan.   67 y.o. male has AAA repaired 4/2017 and 7/2017 showed above; Will need vascular follow up; HA to monitor; First to control his BP; Change back to Losartan 100 mg/d;     1. S/P CABG x 3  stable  - NM-CARDIAC STRESS TEST; Future    2. Essential hypertension  See above    3. Abdominal aortic aneurysm (AAA) without rupture (CMS-HCC)  See above    4. Old MI (myocardial infarction)    - NM-CARDIAC STRESS TEST; Future      Return to clinic in  3  months    1. S/P CABG x 3  NM-CARDIAC STRESS TEST    3/2009   2. Essential hypertension     3. Abdominal aortic aneurysm (AAA) without rupture (CMS-HCC)     4. Old MI (myocardial infarction)  NM-CARDIAC STRESS TEST

## 2017-09-20 NOTE — LETTER
[facility]   [address]   [address]  [place] [address]  Phone: [phone]  Fax: [phone]              Danny De La CruzCait  1950    Encounter Date: 9/20/2017    Dennis Guajardo M.D.    Thank you for the referral. I had the pleasure of seeing Danny De La CruzCait today in cardiology clinic. I've attached my visit note below. If you have any questions please feel free to give me a call anytime.      Brenda Bee MD, PhD, [facility]  Cardiology and Lipidology  [facility]                                                                    PROGRESS NOTE:  Chief Complaint   Patient presents with   • Follow-Up     review meds high b/p, felt dizzy on enalapril 20 mg cut dose in half.         This patient is an established male who is here today to discuss:  Does not like Enalopril but Losartan; Will increas to 100 mg/d    Patient Active Problem List    Diagnosis Date Noted   • Abdominal aortic aneurysm (AAA) without rupture (CMS-[facility]) 08/15/2017   • Aortic aneurysm without rupture (CMS-HCC) 04/11/2017   • MI (myocardial infarction) (CMS-[facility])        Past Medical History:   Diagnosis Date   • MI (myocardial infarction) (CMS-[facility]) 2013     Past Surgical History:   Procedure Laterality Date   • ABDOMINAL AORTIC ANEURYSM N/A 4/11/2017    Procedure: ABDOMINAL AORTIC ANEURYSM;  Surgeon: Charissa Kerr M.D.;  Location: SURGERY [facility];  Service:    • OTHER [facility]  [facility]     Social History     Social History   • Marital status:      Spouse name: N/A   • Number of children: N/A   • Years of education: N/A     Social History Main Topics   • Smoking status: Former Smoker     Packs/day: 1.00     Years: 25.00     Types: Cigarettes     Quit date: 3/2/2009   • Smokeless tobacco: Never Used   • Alcohol use No      Comment: use to drink 2 beers a day quit 03/2009   • Drug use: No   •  Sexual activity: Not Currently     Other Topics Concern   • Not on file     Social History Narrative   • No narrative on file     History reviewed. No pertinent family history.    Current Outpatient Prescriptions   Medication Sig Dispense Refill   • NIFEdipine (ADALAT CC) 30 MG CR tablet TAKE ONE TABLET BY MOUTH DAILY FOR HIGH BLOOD PRESSURE - F/U WITH PCP FOR REFILLS  0   • tamsulosin (FLOMAX) 0.4 MG capsule TAKE 1 CAPSULE BY MOUTH EACH DAY FOR URINARY FREQUENCY  1   • metoprolol (LOPRESSOR) 100 MG Tab Take 100 mg by mouth 2 times a day.     • Hydrocodone-Acetaminophen (VICODIN) 5-300 MG Tab Take  by mouth.     • Polyethylene Glycol 3350 (MIRALAX PO) Take  by mouth.     • aspirin (ASA) 325 MG Tab Take 325 mg by mouth every 6 hours as needed for Mild Pain.       No current facility-administered medications for this visit.      Review of patient's allergies indicates no known allergies.    Review of Systems:   Headache;   Constitutional: Denies fevers, Denies weight changes  Eyes: Denies changes in vision, no eye pain  Ears/Nose/Throat/Mouth: Denies nasal congestion or sore throat   Cardiovascular: Denies chest pain or palpitations   Respiratory: Denies shortness of breath , Denies cough  Gastrointestinal/Hepatic: Denies abdominal pain, nausea, vomiting, diarrhea, constipation or GI bleeding   Genitourinary: Denies bladder dysfunction, dysuria or frequency  Musculoskeletal/Rheum: Denies  joint pain and swelling   Skin/Breast: Denies rash, denies breast lumps or discharge  Neurological: Denies headache, confusion, memory loss or focal weakness/parasthesias  Psychiatric: denies mood disorder   Endocrine: denies hx of diabetes or thyroid dysfunction  Heme/Oncology/Lymph Nodes: Denies enlarged lymph nodes, denies brusing or known bleeding disorder  Allergic/Immunologic: Denies hx of allergies      All other systems were reviewed and are negative (AMA/CMS criteria)      Blood pressure 160/90, pulse (!) 51, height 1.829 m  (6'), weight 77.1 kg (170 lb), SpO2 95 %.  General Appearance:   Well developed, Well nourished, No acute distress, Non-toxic appearance.   HENT:  Normocephalic, Atraumatic, Oropharynx moist mucous membranes, Dentition: , Nose normal.    Eyes:  PERRLA, EOMI, Conjunctiva normal, No discharge.  Neck:  Normal range of motion, No cervical tenderness, Supple, No stridor, no JVD .  No thyromegaly.  No carotid bruit.  Cardiovascular:  Normal heart rate, Normal rhythm,  S1, S2, no S3,  S4; No gallops; No murmurs, No rubs, .   Extremitites with intact distal pulses, no cyanosis, clubbing or edema.  No heaves, thrills, HJR;  Peripheral pulses: carotid 2+, brachial 2+, radial 2+, ulnar 2+, femoral 2+, popliteal 2+, PT 2+, DP 2+;  Lungs:  Respiratory effort is normal. Normal breath sounds, breath sounds clear to auscultation bilaterally,  no rales, no rhonchi, no wheezing.   Abdomen: Bowel sounds normal, Soft, No tenderness, No guarding, No rebound, No masses, No hepatosplenomegaly.  Skin: Warm, Dry, No erythema, No rash, no induration or crepitus.  Neurologic: Alert & oriented x 3, Normal motor function, Normal sensory function, No focal deficits noted, cranial nerves II through XII are normal,  normal gait.  Psychiatric: Affect normal, Judgment normal, Mood normal.    7/19/2017 CT thorx/abd: 1.  Stable appearance of descending thoracic aneurysmal dilatation and atherosclerotic change.  2.  Stable appearance of infrarenal bilobed abdominal aortic aneurysm with maximum transverse dimension of 5.3 cm.  3.  No evidence of aneurysm leak or dissection.  4.  No acute pulmonary process.  5.  No acute inflammatory or obstructive process identified in the abdomen or pelvis.  6.  Horseshoe kidney again noted.    Assessment and Plan.   67 y.o. male has AAA repaired 4/2017 and 7/2017 showed above; Will need vascular follow up; HA to monitor; First to control his BP; Change back to Losartan 100 mg/d;     1. S/P CABG x 3  stable  -  NM-CARDIAC STRESS TEST; Future    2. Essential hypertension  See above    3. Abdominal aortic aneurysm (AAA) without rupture (CMS-HCC)  See above    4. Old MI (myocardial infarction)    - NM-CARDIAC STRESS TEST; Future      Return to clinic in  3  months    1. S/P CABG x 3  NM-CARDIAC STRESS TEST    3/2009   2. Essential hypertension     3. Abdominal aortic aneurysm (AAA) without rupture (CMS-HCC)     4. Old MI (myocardial infarction)  NM-CARDIAC STRESS TEST         Dennis Guajardo M.D.  [address] [address]  VIA Facsimile: [phone]

## 2017-10-07 ENCOUNTER — HOSPITAL ENCOUNTER (OUTPATIENT)
Dept: RADIOLOGY | Facility: MEDICAL CENTER | Age: 67
End: 2017-10-07
Attending: INTERNAL MEDICINE
Payer: MEDICARE

## 2017-10-07 DIAGNOSIS — Z95.1 S/P CABG X 3: ICD-10-CM

## 2017-10-07 DIAGNOSIS — I25.2 OLD MI (MYOCARDIAL INFARCTION): ICD-10-CM

## 2017-10-07 PROCEDURE — A9502 TC99M TETROFOSMIN: HCPCS

## 2017-10-07 PROCEDURE — 700111 HCHG RX REV CODE 636 W/ 250 OVERRIDE (IP)

## 2017-10-07 RX ORDER — REGADENOSON 0.08 MG/ML
INJECTION, SOLUTION INTRAVENOUS
Status: COMPLETED
Start: 2017-10-07 | End: 2017-10-07

## 2017-10-07 RX ADMIN — REGADENOSON 0.4 MG: 0.08 INJECTION, SOLUTION INTRAVENOUS at 09:43

## 2017-10-07 NOTE — PROGRESS NOTES
Nursing care plan includes knowledge deficit, potential for discomfort, potential for compromised cardiac output. POC includes teaching, comfort measures and reassurance, and access to code cart, cardiology stand by and availability of rapid response team. Pt verbalizes good understanding of benefits and risks of pharmacological cardiac stress test. Informed consent obtained. Lexiscan given, pt developed the following symptoms SOB, tingling in hands. VS stable, major symptoms resolved. To waiting room, caffeinated fluids and/or snacks given, awaiting second scan. Nursing goals met.

## 2017-10-31 ENCOUNTER — OFFICE VISIT (OUTPATIENT)
Dept: CARDIOLOGY | Facility: MEDICAL CENTER | Age: 67
End: 2017-10-31
Payer: MEDICARE

## 2017-10-31 ENCOUNTER — HOSPITAL ENCOUNTER (OUTPATIENT)
Dept: LAB | Facility: MEDICAL CENTER | Age: 67
End: 2017-10-31
Attending: INTERNAL MEDICINE
Payer: MEDICARE

## 2017-10-31 VITALS
OXYGEN SATURATION: 96 % | HEART RATE: 50 BPM | WEIGHT: 170 LBS | SYSTOLIC BLOOD PRESSURE: 134 MMHG | HEIGHT: 72 IN | BODY MASS INDEX: 23.03 KG/M2 | DIASTOLIC BLOOD PRESSURE: 80 MMHG

## 2017-10-31 DIAGNOSIS — I10 ESSENTIAL HYPERTENSION: ICD-10-CM

## 2017-10-31 DIAGNOSIS — I25.10 CORONARY ARTERY DISEASE INVOLVING NATIVE CORONARY ARTERY OF NATIVE HEART WITHOUT ANGINA PECTORIS: ICD-10-CM

## 2017-10-31 DIAGNOSIS — I71.40 ABDOMINAL AORTIC ANEURYSM (AAA) WITHOUT RUPTURE (HCC): ICD-10-CM

## 2017-10-31 DIAGNOSIS — E78.2 MIXED HYPERLIPIDEMIA: ICD-10-CM

## 2017-10-31 PROBLEM — N40.0 BENIGN PROSTATIC HYPERPLASIA WITHOUT LOWER URINARY TRACT SYMPTOMS: Status: ACTIVE | Noted: 2017-10-31

## 2017-10-31 LAB
ALBUMIN SERPL BCP-MCNC: 4 G/DL (ref 3.2–4.9)
ALBUMIN/GLOB SERPL: 1.2 G/DL
ALP SERPL-CCNC: 98 U/L (ref 30–99)
ALT SERPL-CCNC: 7 U/L (ref 2–50)
ANION GAP SERPL CALC-SCNC: 6 MMOL/L (ref 0–11.9)
AST SERPL-CCNC: 12 U/L (ref 12–45)
BILIRUB SERPL-MCNC: 0.7 MG/DL (ref 0.1–1.5)
BUN SERPL-MCNC: 21 MG/DL (ref 8–22)
CALCIUM SERPL-MCNC: 9.1 MG/DL (ref 8.5–10.5)
CHLORIDE SERPL-SCNC: 107 MMOL/L (ref 96–112)
CO2 SERPL-SCNC: 27 MMOL/L (ref 20–33)
CREAT SERPL-MCNC: 1.34 MG/DL (ref 0.5–1.4)
GFR SERPL CREATININE-BSD FRML MDRD: 53 ML/MIN/1.73 M 2
GLOBULIN SER CALC-MCNC: 3.4 G/DL (ref 1.9–3.5)
GLUCOSE SERPL-MCNC: 104 MG/DL (ref 65–99)
LIPASE SERPL-CCNC: 22 U/L (ref 11–82)
POTASSIUM SERPL-SCNC: 3.8 MMOL/L (ref 3.6–5.5)
PROT SERPL-MCNC: 7.4 G/DL (ref 6–8.2)
SODIUM SERPL-SCNC: 140 MMOL/L (ref 135–145)

## 2017-10-31 PROCEDURE — 80053 COMPREHEN METABOLIC PANEL: CPT

## 2017-10-31 PROCEDURE — 99214 OFFICE O/P EST MOD 30 MIN: CPT | Performed by: NURSE PRACTITIONER

## 2017-10-31 PROCEDURE — 36415 COLL VENOUS BLD VENIPUNCTURE: CPT

## 2017-10-31 PROCEDURE — 83690 ASSAY OF LIPASE: CPT

## 2017-10-31 RX ORDER — ATORVASTATIN CALCIUM 10 MG/1
10 TABLET, FILM COATED ORAL DAILY
Qty: 30 TAB | Refills: 6 | Status: SHIPPED | OUTPATIENT
Start: 2017-10-31 | End: 2019-08-19

## 2017-10-31 RX ORDER — LOSARTAN POTASSIUM 50 MG/1
50 TABLET ORAL DAILY
Qty: 30 TAB | Refills: 6
Start: 2017-10-31 | End: 2019-08-19

## 2017-10-31 RX ORDER — ENALAPRIL MALEATE 10 MG/1
10 TABLET ORAL DAILY
Qty: 30 TAB | Refills: 6
Start: 2017-10-31 | End: 2019-08-19

## 2017-10-31 RX ORDER — ENALAPRIL MALEATE AND HYDROCHLOROTHIAZIDE 10; 25 MG/1; MG/1
TABLET ORAL
COMMUNITY
Start: 2017-09-28 | End: 2017-10-31

## 2017-10-31 ASSESSMENT — ENCOUNTER SYMPTOMS
SHORTNESS OF BREATH: 0
LOSS OF CONSCIOUSNESS: 0
PALPITATIONS: 0
MYALGIAS: 0
PND: 0
CHILLS: 0
ORTHOPNEA: 0
FEVER: 0
DIZZINESS: 0
ABDOMINAL PAIN: 0
BRUISES/BLEEDS EASILY: 0
HEADACHES: 0

## 2017-10-31 NOTE — PROGRESS NOTES
Subjective:   Danny Antony is a 67 y.o. male who presents today for one month follow-up of elevated BP and medication addition evaluation.    Danny is a 67 year old male with history of CAD, status post CABG x 3 in 2009, AAA status post repair in April 2017 and hypertension, normally followed by Dr. Bee.  At last follow-up, Losartan 50mg once daily was added for better BP control.    He is here today for follow-up. BP is better at home, and he feels better, less dizzy and lightheaded. No chest pain, pressure or discomfort; no palpitations; no shortness of breath, orthopnea or PND; no edema.     Past Medical History:   Diagnosis Date   • AAA (abdominal aortic aneurysm) (CMS-[facility]) 04/2017    Status post repair by Dr. Kerr.   • BPH (benign prostatic hyperplasia)    • CAD (coronary artery disease) 2009    Status post CABG x 3, in [place]   • Hypertension    • MI (myocardial infarction) 2013     Past Surgical History:   Procedure Laterality Date   • ABDOMINAL AORTIC ANEURYSM N/A 4/11/2017    Procedure: ABDOMINAL AORTIC ANEURYSM;  Surgeon: Charissa Kerr M.D.;  Location: SURGERY [facility];  Service:    • MULTIPLE CORONARY ARTERY BYPASS  2009    CABG x 3, in [place]     History reviewed. No pertinent family history.  History   Smoking Status   • Former Smoker   • Packs/day: 1.00   • Years: 25.00   • Types: Cigarettes   • Quit date: 3/2/2009   Smokeless Tobacco   • Never Used     No Known Allergies  Outpatient Encounter Prescriptions as of 10/31/2017   Medication Sig Dispense Refill   • losartan (COZAAR) 50 MG Tab Take 1 Tab by mouth every day. 30 Tab 6   • enalapril (VASOTEC) 10 MG Tab Take 1 Tab by mouth every day. 30 Tab 6   • NIFEdipine (ADALAT CC) 30 MG CR tablet TAKE ONE TABLET BY MOUTH DAILY FOR HIGH BLOOD PRESSURE - F/U WITH PCP FOR REFILLS  0   • metoprolol (LOPRESSOR) 100 MG Tab Take 100 mg by mouth 2 times a day.     • aspirin (ASA) 325 MG Tab Take 325 mg by mouth every 6 hours as needed for  Mild Pain.     • [DISCONTINUED] enalapril-hydrochlorthiazide (VASERETIC) 10-25 MG per tablet      • [DISCONTINUED] tamsulosin (FLOMAX) 0.4 MG capsule TAKE 1 CAPSULE BY MOUTH EACH DAY FOR URINARY FREQUENCY  1   • [DISCONTINUED] Hydrocodone-Acetaminophen (VICODIN) 5-300 MG Tab Take  by mouth.     • [DISCONTINUED] Polyethylene Glycol 3350 (MIRALAX PO) Take  by mouth.       No facility-administered encounter medications on file as of 10/31/2017.      Review of Systems   Constitutional: Negative for chills and fever.   Respiratory: Negative for shortness of breath.    Cardiovascular: Negative for chest pain, palpitations, orthopnea, leg swelling and PND.   Gastrointestinal: Negative for abdominal pain.   Musculoskeletal: Negative for myalgias.   Neurological: Negative for dizziness, loss of consciousness and headaches.   Endo/Heme/Allergies: Does not bruise/bleed easily.        Objective:   /80   Pulse (!) 50   Ht 1.829 m (6')   Wt 77.1 kg (170 lb)   SpO2 96%   BMI 23.06 kg/m²     Physical Exam   Constitutional: He is oriented to person, place, and time. He appears well-developed and well-nourished. No distress.   HENT:   Head: Normocephalic.   Eyes: Conjunctivae and EOM are normal.   Neck: Normal range of motion. Neck supple. No JVD present. No thyromegaly present.   Cardiovascular: Normal rate, regular rhythm, normal heart sounds and intact distal pulses.  Exam reveals no gallop and no friction rub.    No murmur heard.  Pulmonary/Chest: Effort normal and breath sounds normal. No respiratory distress.   Sternotomy scar present.   Abdominal: Soft. Bowel sounds are normal. There is no tenderness.   Scar of AAA repair.   Musculoskeletal: Normal range of motion. He exhibits no edema.   Neurological: He is alert and oriented to person, place, and time.   Skin: Skin is warm and dry. No rash noted.   Psychiatric: He has a normal mood and affect. His behavior is normal.     Lab Results   Component Value Date/Time     CHOLSTRLTOT 163 09/07/2017 07:28 AM    LDL 93 09/07/2017 07:28 AM    HDL 30 (A) 09/07/2017 07:28 AM    TRIGLYCERIDE 198 (H) 09/07/2017 07:28 AM       Lab Results   Component Value Date/Time    SODIUM 140 10/31/2017 06:50 AM    POTASSIUM 3.8 10/31/2017 06:50 AM    CHLORIDE 107 10/31/2017 06:50 AM    CO2 27 10/31/2017 06:50 AM    GLUCOSE 104 (H) 10/31/2017 06:50 AM    BUN 21 10/31/2017 06:50 AM    CREATININE 1.34 10/31/2017 06:50 AM     Lab Results   Component Value Date/Time    ALKPHOSPHAT 98 10/31/2017 06:50 AM    ASTSGOT 12 10/31/2017 06:50 AM    ALTSGPT 7 10/31/2017 06:50 AM    TBILIRUBIN 0.7 10/31/2017 06:50 AM        Assessment:     1. Essential hypertension  losartan (COZAAR) 50 MG Tab    enalapril (VASOTEC) 10 MG Tab   2. Coronary artery disease involving native coronary artery of native heart without angina pectoris  atorvastatin (LIPITOR) 10 MG Tab   3. Abdominal aortic aneurysm (AAA) without rupture (CMS-[facility])     4. Mixed hyperlipidemia         Medical Decision Making:  Today's Assessment / Status / Plan:     1. Hypertension, treated and stable. BP is better today.    2. CAD, status post CABG x 3, stable.  He has not had any angina.    3. AAA, status post repair in April 2017, stable.    4. Hyperlipidemia, to suggest statin, namely Lipitor 10mg once daily.    Same medications, add statin.  FU in 6 months, sooner if clinical condition changes.    Collaborating MD: Shira

## 2017-10-31 NOTE — LETTER
[facility]   [address]   [address]  [place] [address]  Phone: [phone]  Fax: [phone]              Danny Antony  1950    Encounter Date: 10/31/2017    YUNG Welsh          PROGRESS NOTE:  No notes on file      No Recipients

## 2017-11-17 ENCOUNTER — OFFICE VISIT (OUTPATIENT)
Dept: PULMONOLOGY | Facility: HOSPICE | Age: 67
End: 2017-11-17
Payer: MEDICARE

## 2017-11-17 VITALS
OXYGEN SATURATION: 98 % | HEART RATE: 48 BPM | SYSTOLIC BLOOD PRESSURE: 130 MMHG | HEIGHT: 72 IN | TEMPERATURE: 98.9 F | DIASTOLIC BLOOD PRESSURE: 80 MMHG | RESPIRATION RATE: 16 BRPM | WEIGHT: 170 LBS | BODY MASS INDEX: 23.03 KG/M2

## 2017-11-17 DIAGNOSIS — R91.8 PULMONARY NODULES: ICD-10-CM

## 2017-11-17 PROCEDURE — 99204 OFFICE O/P NEW MOD 45 MIN: CPT | Performed by: INTERNAL MEDICINE

## 2017-11-17 RX ORDER — PANTOPRAZOLE SODIUM 40 MG/1
TABLET, DELAYED RELEASE ORAL
COMMUNITY
Start: 2017-11-13 | End: 2019-08-19

## 2017-11-17 RX ORDER — TAMSULOSIN HYDROCHLORIDE 0.4 MG/1
0.4 CAPSULE ORAL DAILY
COMMUNITY

## 2017-11-17 NOTE — LETTER
Gwyn Ricardo M.D.  [facility]   [address] [place] [address] [place]  [place] [address]  Phone: [phone] - Fax: [phone]           Encounter Date: 11/17/2017  Provider: Gwyn Ricardo M.D.  Location of Care: [facility] PULMONARY MEDICINE      Patient:   Danny Antony   MR Number: [id number]   YOB: 1950     PROGRESS NOTE:  Chief Complaint   Patient presents with   • New Patient     Pulmoanry nodule       HPI:  The patient is a 67-year-old man who had an abdominal aortic aneurysm repaired in April 2017. At that time the patient had a CT of his chest which revealed a 5 mm right upper lobe nodule. The patient is a former smoker who quit smoking in 2009. He has no history of COPD or asthma. He has no cough, shortness of breath, sputum production, or weight loss. He has recovered from his surgery. He had a repeat CT scan in July 2017 which again showed the right upper lobe nodule and some mediastinal nodes which are not enlarged. The patient has some GI discomfort at times but otherwise is feeling well.    Past Medical History:   Diagnosis Date   • AAA (abdominal aortic aneurysm) (CMS-[facility]) 04/2017    Status post repair by Dr. Kerr.   • Abdominal pain    • Aneurysm (CMS-[facility])    • Back pain    • BPH (benign prostatic hyperplasia)    • CAD (coronary artery disease) 2009    Status post CABG x 3, in [place]   • Eye pain    • Fatigue    • Hyperlipidemia    • Hypertension    • Lumps on the skin    • MI (myocardial infarction) 2013   • Snoring    • Vision loss    • Weakness        ROS:   Constitutional: Denies fevers, chills, night sweats, fatigue or weight loss  Eyes: Denies vision loss, pain, drainage, double vision  Ears, Nose, Throat: Denies earache, tinnitus, hoarseness  Cardiovascular: Denies chest pain, tightness, palpitations  Respiratory: Denies shortness of breath, sputum production, cough, hemoptysis  Sleep: Denies, snoring, apnea  GI: Denies   nausea, vomiting, diarrhea.He does have occasional abdominal discomfort and is being followed by gastroenterology  : Denies frequent urination, hematuria, painful urination  Musculoskeletal: Denies back pain, painful joints, sore muscles  Neurological: Denies headaches, seizures  Skin: Denies rashes, color changes  Psychiatric: Denies depression or thoughts of suicide  Hematologic: Denies bleeding tendency or clotting tendency  Allergic/Immunologic: Denies rhinitis, skin sensitivity    Social History     Social History   • Marital status:      Spouse name: N/A   • Number of children: N/A   • Years of education: N/A     Occupational History   • Not on file.     Social History Main Topics   • Smoking status: Former Smoker     Packs/day: 1.00     Years: 25.00     Types: Cigarettes     Quit date: 3/2/2009   • Smokeless tobacco: Never Used   • Alcohol use No      Comment: use to drink 2 beers a day quit 03/2009   • Drug use: No   • Sexual activity: Not Currently     Other Topics Concern   • Not on file     Social History Narrative   • No narrative on file     Patient has no known allergies.  Current Outpatient Prescriptions on File Prior to Visit   Medication Sig Dispense Refill   • losartan (COZAAR) 50 MG Tab Take 1 Tab by mouth every day. 30 Tab 6   • enalapril (VASOTEC) 10 MG Tab Take 1 Tab by mouth every day. 30 Tab 6   • atorvastatin (LIPITOR) 10 MG Tab Take 1 Tab by mouth every day. 30 Tab 6   • metoprolol (LOPRESSOR) 100 MG Tab Take 100 mg by mouth 2 times a day.     • aspirin (ASA) 325 MG Tab Take 325 mg by mouth every 6 hours as needed for Mild Pain.     • NIFEdipine (ADALAT CC) 30 MG CR tablet TAKE ONE TABLET BY MOUTH DAILY FOR HIGH BLOOD PRESSURE - F/U WITH PCP FOR REFILLS  0     No current facility-administered medications on file prior to visit.      Blood pressure 130/80, pulse (!) 48, temperature 37.2 °C (98.9 °F), resp. rate 16, height 1.829 m (6'), weight 77.1 kg (170 lb), SpO2 98 %.  Family  History   Problem Relation Age of Onset   • Diabetes Mother    • Lung Cancer Father    • Hypertension Sister    • Diabetes Brother        Physical Exam:    HEENT: PERRLA, EOMI, no scleral icterus, no nasal or oral lesions  Neck: No thyromegaly, no adenopathy, no bruits  Mallampatti: Grade II  Lungs: Equal breath sounds, no wheezes or crackles  Heart: Regular rate and rhythm, no gallops or murmurs  Abdomen: Soft, benign, no organomegaly  Extremities: No clubbing, cyanosis, or edema  Neurologic: Cranial nerve, motor, and sensory exam are normal    1. Pulmonary nodules        He has a right upper lobe nodule that measures 5 mm which did not change between April and July 2017. We will repeat a CT scan in July 2018. He will need to be followed through April 2019 to establish 2 years of stability in the nodule.      Electronically signed by Gwyn Ricardo M.D.  on 11/17/17    No Recipients

## 2017-11-18 NOTE — PROGRESS NOTES
Chief Complaint   Patient presents with   • New Patient     Pulmoanry nodule       HPI:  The patient is a 67-year-old man who had an abdominal aortic aneurysm repaired in April 2017. At that time the patient had a CT of his chest which revealed a 5 mm right upper lobe nodule. The patient is a former smoker who quit smoking in 2009. He has no history of COPD or asthma. He has no cough, shortness of breath, sputum production, or weight loss. He has recovered from his surgery. He had a repeat CT scan in July 2017 which again showed the right upper lobe nodule and some mediastinal nodes which are not enlarged. The patient has some GI discomfort at times but otherwise is feeling well.    Past Medical History:   Diagnosis Date   • AAA (abdominal aortic aneurysm) (CMS-[facility]) 04/2017    Status post repair by Dr. Kerr.   • Abdominal pain    • Aneurysm (CMS-HCC)    • Back pain    • BPH (benign prostatic hyperplasia)    • CAD (coronary artery disease) 2009    Status post CABG x 3, in [place]   • Eye pain    • Fatigue    • Hyperlipidemia    • Hypertension    • Lumps on the skin    • MI (myocardial infarction) 2013   • Snoring    • Vision loss    • Weakness        ROS:   Constitutional: Denies fevers, chills, night sweats, fatigue or weight loss  Eyes: Denies vision loss, pain, drainage, double vision  Ears, Nose, Throat: Denies earache, tinnitus, hoarseness  Cardiovascular: Denies chest pain, tightness, palpitations  Respiratory: Denies shortness of breath, sputum production, cough, hemoptysis  Sleep: Denies, snoring, apnea  GI: Denies  nausea, vomiting, diarrhea.He does have occasional abdominal discomfort and is being followed by gastroenterology  : Denies frequent urination, hematuria, painful urination  Musculoskeletal: Denies back pain, painful joints, sore muscles  Neurological: Denies headaches, seizures  Skin: Denies rashes, color changes  Psychiatric: Denies depression or thoughts of suicide  Hematologic: Denies  bleeding tendency or clotting tendency  Allergic/Immunologic: Denies rhinitis, skin sensitivity    Social History     Social History   • Marital status:      Spouse name: N/A   • Number of children: N/A   • Years of education: N/A     Occupational History   • Not on file.     Social History Main Topics   • Smoking status: Former Smoker     Packs/day: 1.00     Years: 25.00     Types: Cigarettes     Quit date: 3/2/2009   • Smokeless tobacco: Never Used   • Alcohol use No      Comment: use to drink 2 beers a day quit 03/2009   • Drug use: No   • Sexual activity: Not Currently     Other Topics Concern   • Not on file     Social History Narrative   • No narrative on file     Patient has no known allergies.  Current Outpatient Prescriptions on File Prior to Visit   Medication Sig Dispense Refill   • losartan (COZAAR) 50 MG Tab Take 1 Tab by mouth every day. 30 Tab 6   • enalapril (VASOTEC) 10 MG Tab Take 1 Tab by mouth every day. 30 Tab 6   • atorvastatin (LIPITOR) 10 MG Tab Take 1 Tab by mouth every day. 30 Tab 6   • metoprolol (LOPRESSOR) 100 MG Tab Take 100 mg by mouth 2 times a day.     • aspirin (ASA) 325 MG Tab Take 325 mg by mouth every 6 hours as needed for Mild Pain.     • NIFEdipine (ADALAT CC) 30 MG CR tablet TAKE ONE TABLET BY MOUTH DAILY FOR HIGH BLOOD PRESSURE - F/U WITH PCP FOR REFILLS  0     No current facility-administered medications on file prior to visit.      Blood pressure 130/80, pulse (!) 48, temperature 37.2 °C (98.9 °F), resp. rate 16, height 1.829 m (6'), weight 77.1 kg (170 lb), SpO2 98 %.  Family History   Problem Relation Age of Onset   • Diabetes Mother    • Lung Cancer Father    • Hypertension Sister    • Diabetes Brother        Physical Exam:    HEENT: PERRLA, EOMI, no scleral icterus, no nasal or oral lesions  Neck: No thyromegaly, no adenopathy, no bruits  Mallampatti: Grade II  Lungs: Equal breath sounds, no wheezes or crackles  Heart: Regular rate and rhythm, no gallops or  murmurs  Abdomen: Soft, benign, no organomegaly  Extremities: No clubbing, cyanosis, or edema  Neurologic: Cranial nerve, motor, and sensory exam are normal    1. Pulmonary nodules        He has a right upper lobe nodule that measures 5 mm which did not change between April and July 2017. We will repeat a CT scan in July 2018. He will need to be followed through April 2019 to establish 2 years of stability in the nodule.

## 2019-08-19 ENCOUNTER — APPOINTMENT (OUTPATIENT)
Dept: RADIOLOGY | Facility: MEDICAL CENTER | Age: 69
End: 2019-08-19
Attending: EMERGENCY MEDICINE
Payer: MEDICARE

## 2019-08-19 ENCOUNTER — HOSPITAL ENCOUNTER (OUTPATIENT)
Facility: MEDICAL CENTER | Age: 69
End: 2019-08-23
Attending: EMERGENCY MEDICINE | Admitting: INTERNAL MEDICINE
Payer: MEDICARE

## 2019-08-19 DIAGNOSIS — R10.12 LEFT UPPER QUADRANT PAIN: ICD-10-CM

## 2019-08-19 DIAGNOSIS — R07.9 CHEST PAIN, UNSPECIFIED TYPE: ICD-10-CM

## 2019-08-19 LAB
ALBUMIN SERPL BCP-MCNC: 4.2 G/DL (ref 3.2–4.9)
ALBUMIN/GLOB SERPL: 1.2 G/DL
ALP SERPL-CCNC: 94 U/L (ref 30–99)
ALT SERPL-CCNC: 12 U/L (ref 2–50)
ANION GAP SERPL CALC-SCNC: 9 MMOL/L (ref 0–11.9)
AST SERPL-CCNC: 14 U/L (ref 12–45)
BASOPHILS # BLD AUTO: 0.6 % (ref 0–1.8)
BASOPHILS # BLD: 0.05 K/UL (ref 0–0.12)
BILIRUB SERPL-MCNC: 0.7 MG/DL (ref 0.1–1.5)
BUN SERPL-MCNC: 19 MG/DL (ref 8–22)
CALCIUM SERPL-MCNC: 9.2 MG/DL (ref 8.5–10.5)
CHLORIDE SERPL-SCNC: 103 MMOL/L (ref 96–112)
CO2 SERPL-SCNC: 28 MMOL/L (ref 20–33)
CREAT SERPL-MCNC: 1.31 MG/DL (ref 0.5–1.4)
EKG IMPRESSION: NORMAL
EOSINOPHIL # BLD AUTO: 0.29 K/UL (ref 0–0.51)
EOSINOPHIL NFR BLD: 3.4 % (ref 0–6.9)
ERYTHROCYTE [DISTWIDTH] IN BLOOD BY AUTOMATED COUNT: 43.7 FL (ref 35.9–50)
GLOBULIN SER CALC-MCNC: 3.5 G/DL (ref 1.9–3.5)
GLUCOSE SERPL-MCNC: 112 MG/DL (ref 65–99)
HCT VFR BLD AUTO: 51.2 % (ref 42–52)
HGB BLD-MCNC: 16.7 G/DL (ref 14–18)
IMM GRANULOCYTES # BLD AUTO: 0.02 K/UL (ref 0–0.11)
IMM GRANULOCYTES NFR BLD AUTO: 0.2 % (ref 0–0.9)
LIPASE SERPL-CCNC: 32 U/L (ref 11–82)
LYMPHOCYTES # BLD AUTO: 2.49 K/UL (ref 1–4.8)
LYMPHOCYTES NFR BLD: 29.1 % (ref 22–41)
MCH RBC QN AUTO: 28.3 PG (ref 27–33)
MCHC RBC AUTO-ENTMCNC: 32.6 G/DL (ref 33.7–35.3)
MCV RBC AUTO: 86.8 FL (ref 81.4–97.8)
MONOCYTES # BLD AUTO: 0.64 K/UL (ref 0–0.85)
MONOCYTES NFR BLD AUTO: 7.5 % (ref 0–13.4)
NEUTROPHILS # BLD AUTO: 5.06 K/UL (ref 1.82–7.42)
NEUTROPHILS NFR BLD: 59.2 % (ref 44–72)
NRBC # BLD AUTO: 0 K/UL
NRBC BLD-RTO: 0 /100 WBC
PLATELET # BLD AUTO: 156 K/UL (ref 164–446)
PMV BLD AUTO: 12.2 FL (ref 9–12.9)
POTASSIUM SERPL-SCNC: 3.4 MMOL/L (ref 3.6–5.5)
PROT SERPL-MCNC: 7.7 G/DL (ref 6–8.2)
RBC # BLD AUTO: 5.9 M/UL (ref 4.7–6.1)
SODIUM SERPL-SCNC: 140 MMOL/L (ref 135–145)
TROPONIN T SERPL-MCNC: 31 NG/L (ref 6–19)
TROPONIN T SERPL-MCNC: 52 NG/L (ref 6–19)
WBC # BLD AUTO: 8.6 K/UL (ref 4.8–10.8)

## 2019-08-19 PROCEDURE — G0378 HOSPITAL OBSERVATION PER HR: HCPCS

## 2019-08-19 PROCEDURE — 71045 X-RAY EXAM CHEST 1 VIEW: CPT

## 2019-08-19 PROCEDURE — 93005 ELECTROCARDIOGRAM TRACING: CPT | Performed by: EMERGENCY MEDICINE

## 2019-08-19 PROCEDURE — 84484 ASSAY OF TROPONIN QUANT: CPT | Mod: 91

## 2019-08-19 PROCEDURE — 83690 ASSAY OF LIPASE: CPT

## 2019-08-19 PROCEDURE — 93005 ELECTROCARDIOGRAM TRACING: CPT

## 2019-08-19 PROCEDURE — 99220 PR INITIAL OBSERVATION CARE,LEVL III: CPT | Performed by: INTERNAL MEDICINE

## 2019-08-19 PROCEDURE — 85025 COMPLETE CBC W/AUTO DIFF WBC: CPT

## 2019-08-19 PROCEDURE — 80053 COMPREHEN METABOLIC PANEL: CPT

## 2019-08-19 PROCEDURE — A9270 NON-COVERED ITEM OR SERVICE: HCPCS | Performed by: EMERGENCY MEDICINE

## 2019-08-19 PROCEDURE — 99285 EMERGENCY DEPT VISIT HI MDM: CPT

## 2019-08-19 PROCEDURE — 700102 HCHG RX REV CODE 250 W/ 637 OVERRIDE(OP): Performed by: EMERGENCY MEDICINE

## 2019-08-19 RX ORDER — FAMOTIDINE 20 MG/1
20 TABLET, FILM COATED ORAL 2 TIMES DAILY
Status: DISCONTINUED | OUTPATIENT
Start: 2019-08-19 | End: 2019-08-21

## 2019-08-19 RX ORDER — POLYETHYLENE GLYCOL 3350 17 G/17G
1 POWDER, FOR SOLUTION ORAL
Status: DISCONTINUED | OUTPATIENT
Start: 2019-08-19 | End: 2019-08-23 | Stop reason: HOSPADM

## 2019-08-19 RX ORDER — POTASSIUM CHLORIDE 20 MEQ/1
40 TABLET, EXTENDED RELEASE ORAL 2 TIMES DAILY
Status: COMPLETED | OUTPATIENT
Start: 2019-08-19 | End: 2019-08-20

## 2019-08-19 RX ORDER — NIFEDIPINE 30 MG
30 TABLET, EXTENDED RELEASE ORAL DAILY
COMMUNITY

## 2019-08-19 RX ORDER — RANITIDINE 150 MG/1
150 TABLET ORAL 2 TIMES DAILY
Status: ON HOLD | COMMUNITY
End: 2019-08-22

## 2019-08-19 RX ORDER — AMOXICILLIN 250 MG
2 CAPSULE ORAL 2 TIMES DAILY
Status: DISCONTINUED | OUTPATIENT
Start: 2019-08-19 | End: 2019-08-23 | Stop reason: HOSPADM

## 2019-08-19 RX ORDER — OXYCODONE HYDROCHLORIDE 10 MG/1
10 TABLET ORAL
Status: DISCONTINUED | OUTPATIENT
Start: 2019-08-19 | End: 2019-08-23 | Stop reason: HOSPADM

## 2019-08-19 RX ORDER — RANITIDINE 150 MG/1
150 TABLET ORAL 2 TIMES DAILY
Status: DISCONTINUED | OUTPATIENT
Start: 2019-08-19 | End: 2019-08-19

## 2019-08-19 RX ORDER — ENALAPRIL MALEATE 10 MG/1
10 TABLET ORAL DAILY
Status: DISCONTINUED | OUTPATIENT
Start: 2019-08-20 | End: 2019-08-20

## 2019-08-19 RX ORDER — NIFEDIPINE 30 MG/1
30 TABLET, EXTENDED RELEASE ORAL DAILY
Status: DISCONTINUED | OUTPATIENT
Start: 2019-08-20 | End: 2019-08-23 | Stop reason: HOSPADM

## 2019-08-19 RX ORDER — OXYCODONE HYDROCHLORIDE 5 MG/1
5 TABLET ORAL
Status: DISCONTINUED | OUTPATIENT
Start: 2019-08-19 | End: 2019-08-23 | Stop reason: HOSPADM

## 2019-08-19 RX ORDER — TAMSULOSIN HYDROCHLORIDE 0.4 MG/1
0.4 CAPSULE ORAL DAILY
Status: DISCONTINUED | OUTPATIENT
Start: 2019-08-20 | End: 2019-08-23 | Stop reason: HOSPADM

## 2019-08-19 RX ORDER — METOPROLOL TARTRATE 50 MG/1
100 TABLET, FILM COATED ORAL 2 TIMES DAILY
Status: DISCONTINUED | OUTPATIENT
Start: 2019-08-19 | End: 2019-08-23 | Stop reason: HOSPADM

## 2019-08-19 RX ORDER — MORPHINE SULFATE 4 MG/ML
4 INJECTION, SOLUTION INTRAMUSCULAR; INTRAVENOUS
Status: DISCONTINUED | OUTPATIENT
Start: 2019-08-19 | End: 2019-08-23 | Stop reason: HOSPADM

## 2019-08-19 RX ORDER — ATORVASTATIN CALCIUM 10 MG/1
10 TABLET, FILM COATED ORAL NIGHTLY
Status: DISCONTINUED | OUTPATIENT
Start: 2019-08-20 | End: 2019-08-23 | Stop reason: HOSPADM

## 2019-08-19 RX ORDER — ENALAPRIL MALEATE 10 MG/1
10 TABLET ORAL DAILY
COMMUNITY
End: 2019-09-06 | Stop reason: SDUPTHER

## 2019-08-19 RX ORDER — IBUPROFEN 800 MG/1
800 TABLET ORAL 3 TIMES DAILY
Status: DISCONTINUED | OUTPATIENT
Start: 2019-08-20 | End: 2019-08-22

## 2019-08-19 RX ORDER — BISACODYL 10 MG
10 SUPPOSITORY, RECTAL RECTAL
Status: DISCONTINUED | OUTPATIENT
Start: 2019-08-19 | End: 2019-08-23 | Stop reason: HOSPADM

## 2019-08-19 RX ORDER — ENALAPRILAT 1.25 MG/ML
1.25 INJECTION INTRAVENOUS EVERY 6 HOURS PRN
Status: DISCONTINUED | OUTPATIENT
Start: 2019-08-19 | End: 2019-08-23 | Stop reason: HOSPADM

## 2019-08-19 RX ORDER — ASPIRIN 325 MG
325 TABLET ORAL DAILY
Status: DISCONTINUED | OUTPATIENT
Start: 2019-08-20 | End: 2019-08-23 | Stop reason: HOSPADM

## 2019-08-19 RX ORDER — ASPIRIN 325 MG
325 TABLET ORAL ONCE
Status: COMPLETED | OUTPATIENT
Start: 2019-08-19 | End: 2019-08-19

## 2019-08-19 RX ORDER — ACETAMINOPHEN 325 MG/1
650 TABLET ORAL EVERY 6 HOURS PRN
Status: DISCONTINUED | OUTPATIENT
Start: 2019-08-19 | End: 2019-08-23 | Stop reason: HOSPADM

## 2019-08-19 RX ORDER — ATORVASTATIN CALCIUM 10 MG/1
10 TABLET, FILM COATED ORAL NIGHTLY
COMMUNITY

## 2019-08-19 RX ADMIN — ASPIRIN 325 MG: 325 TABLET, FILM COATED ORAL at 22:04

## 2019-08-19 ASSESSMENT — ENCOUNTER SYMPTOMS
FALLS: 0
DIARRHEA: 0
SHORTNESS OF BREATH: 1
DEPRESSION: 0
ABDOMINAL PAIN: 0
STRIDOR: 0
COUGH: 0
CHILLS: 0
TINGLING: 0
LOSS OF CONSCIOUSNESS: 0
DIZZINESS: 0
WEAKNESS: 0
FEVER: 0
VOMITING: 0
PALPITATIONS: 0
CONSTIPATION: 1
HEADACHES: 0
MYALGIAS: 0
NAUSEA: 0
SPUTUM PRODUCTION: 0

## 2019-08-20 PROBLEM — R07.9 CHEST PAIN: Status: ACTIVE | Noted: 2019-08-20

## 2019-08-20 PROBLEM — D69.6 THROMBOCYTOPENIA (HCC): Status: ACTIVE | Noted: 2019-08-20

## 2019-08-20 PROBLEM — E87.6 HYPOKALEMIA: Status: ACTIVE | Noted: 2019-08-20

## 2019-08-20 PROBLEM — D75.1 POLYCYTHEMIA: Status: ACTIVE | Noted: 2019-08-20

## 2019-08-20 PROBLEM — N18.9 CHRONIC RENAL DISEASE: Status: ACTIVE | Noted: 2019-08-20

## 2019-08-20 LAB
ANION GAP SERPL CALC-SCNC: 10 MMOL/L (ref 0–11.9)
ANION GAP SERPL CALC-SCNC: 6 MMOL/L (ref 0–11.9)
BASOPHILS # BLD AUTO: 0.7 % (ref 0–1.8)
BASOPHILS # BLD: 0.06 K/UL (ref 0–0.12)
BUN SERPL-MCNC: 16 MG/DL (ref 8–22)
BUN SERPL-MCNC: 17 MG/DL (ref 8–22)
CALCIUM SERPL-MCNC: 8.6 MG/DL (ref 8.5–10.5)
CALCIUM SERPL-MCNC: 9.1 MG/DL (ref 8.5–10.5)
CHLORIDE SERPL-SCNC: 105 MMOL/L (ref 96–112)
CHLORIDE SERPL-SCNC: 107 MMOL/L (ref 96–112)
CHOLEST SERPL-MCNC: 154 MG/DL (ref 100–199)
CO2 SERPL-SCNC: 22 MMOL/L (ref 20–33)
CO2 SERPL-SCNC: 29 MMOL/L (ref 20–33)
CREAT SERPL-MCNC: 1.14 MG/DL (ref 0.5–1.4)
CREAT SERPL-MCNC: 1.21 MG/DL (ref 0.5–1.4)
EKG IMPRESSION: NORMAL
EKG IMPRESSION: NORMAL
EOSINOPHIL # BLD AUTO: 0.32 K/UL (ref 0–0.51)
EOSINOPHIL NFR BLD: 3.9 % (ref 0–6.9)
ERYTHROCYTE [DISTWIDTH] IN BLOOD BY AUTOMATED COUNT: 42.9 FL (ref 35.9–50)
GLUCOSE SERPL-MCNC: 115 MG/DL (ref 65–99)
GLUCOSE SERPL-MCNC: 147 MG/DL (ref 65–99)
HCT VFR BLD AUTO: 50.3 % (ref 42–52)
HDLC SERPL-MCNC: 26 MG/DL
HGB BLD-MCNC: 16.2 G/DL (ref 14–18)
IMM GRANULOCYTES # BLD AUTO: 0.02 K/UL (ref 0–0.11)
IMM GRANULOCYTES NFR BLD AUTO: 0.2 % (ref 0–0.9)
LDLC SERPL CALC-MCNC: 68 MG/DL
LYMPHOCYTES # BLD AUTO: 2.35 K/UL (ref 1–4.8)
LYMPHOCYTES NFR BLD: 28.9 % (ref 22–41)
MCH RBC QN AUTO: 27.8 PG (ref 27–33)
MCHC RBC AUTO-ENTMCNC: 32.2 G/DL (ref 33.7–35.3)
MCV RBC AUTO: 86.3 FL (ref 81.4–97.8)
MONOCYTES # BLD AUTO: 0.7 K/UL (ref 0–0.85)
MONOCYTES NFR BLD AUTO: 8.6 % (ref 0–13.4)
NEUTROPHILS # BLD AUTO: 4.69 K/UL (ref 1.82–7.42)
NEUTROPHILS NFR BLD: 57.7 % (ref 44–72)
NRBC # BLD AUTO: 0 K/UL
NRBC BLD-RTO: 0 /100 WBC
PLATELET # BLD AUTO: 161 K/UL (ref 164–446)
PMV BLD AUTO: 12.6 FL (ref 9–12.9)
POTASSIUM SERPL-SCNC: 3.5 MMOL/L (ref 3.6–5.5)
POTASSIUM SERPL-SCNC: 4 MMOL/L (ref 3.6–5.5)
RBC # BLD AUTO: 5.83 M/UL (ref 4.7–6.1)
SODIUM SERPL-SCNC: 139 MMOL/L (ref 135–145)
SODIUM SERPL-SCNC: 140 MMOL/L (ref 135–145)
TRIGL SERPL-MCNC: 298 MG/DL (ref 0–149)
TROPONIN T SERPL-MCNC: 44 NG/L (ref 6–19)
TROPONIN T SERPL-MCNC: 49 NG/L (ref 6–19)
TROPONIN T SERPL-MCNC: 51 NG/L (ref 6–19)
TROPONIN T SERPL-MCNC: 53 NG/L (ref 6–19)
WBC # BLD AUTO: 8.1 K/UL (ref 4.8–10.8)

## 2019-08-20 PROCEDURE — 36415 COLL VENOUS BLD VENIPUNCTURE: CPT

## 2019-08-20 PROCEDURE — 99226 PR SUBSEQUENT OBSERVATION CARE,LEVEL III: CPT | Performed by: INTERNAL MEDICINE

## 2019-08-20 PROCEDURE — 96372 THER/PROPH/DIAG INJ SC/IM: CPT

## 2019-08-20 PROCEDURE — G0378 HOSPITAL OBSERVATION PER HR: HCPCS

## 2019-08-20 PROCEDURE — 93010 ELECTROCARDIOGRAM REPORT: CPT | Mod: 76 | Performed by: INTERNAL MEDICINE

## 2019-08-20 PROCEDURE — 93005 ELECTROCARDIOGRAM TRACING: CPT | Performed by: HOSPITALIST

## 2019-08-20 PROCEDURE — 84484 ASSAY OF TROPONIN QUANT: CPT | Mod: 91

## 2019-08-20 PROCEDURE — A9270 NON-COVERED ITEM OR SERVICE: HCPCS | Performed by: INTERNAL MEDICINE

## 2019-08-20 PROCEDURE — 93010 ELECTROCARDIOGRAM REPORT: CPT | Performed by: INTERNAL MEDICINE

## 2019-08-20 PROCEDURE — 99204 OFFICE O/P NEW MOD 45 MIN: CPT | Performed by: INTERNAL MEDICINE

## 2019-08-20 PROCEDURE — 85025 COMPLETE CBC W/AUTO DIFF WBC: CPT

## 2019-08-20 PROCEDURE — 93005 ELECTROCARDIOGRAM TRACING: CPT | Mod: 77 | Performed by: INTERNAL MEDICINE

## 2019-08-20 PROCEDURE — 80048 BASIC METABOLIC PNL TOTAL CA: CPT | Mod: 91

## 2019-08-20 PROCEDURE — 84100 ASSAY OF PHOSPHORUS: CPT

## 2019-08-20 PROCEDURE — 700111 HCHG RX REV CODE 636 W/ 250 OVERRIDE (IP): Performed by: INTERNAL MEDICINE

## 2019-08-20 PROCEDURE — 700102 HCHG RX REV CODE 250 W/ 637 OVERRIDE(OP): Performed by: INTERNAL MEDICINE

## 2019-08-20 PROCEDURE — 83735 ASSAY OF MAGNESIUM: CPT

## 2019-08-20 PROCEDURE — 96375 TX/PRO/DX INJ NEW DRUG ADDON: CPT

## 2019-08-20 PROCEDURE — 80061 LIPID PANEL: CPT

## 2019-08-20 RX ORDER — ENALAPRIL MALEATE 10 MG/1
20 TABLET ORAL DAILY
Status: DISCONTINUED | OUTPATIENT
Start: 2019-08-21 | End: 2019-08-23 | Stop reason: HOSPADM

## 2019-08-20 RX ADMIN — METOPROLOL TARTRATE 100 MG: 50 TABLET ORAL at 18:21

## 2019-08-20 RX ADMIN — FAMOTIDINE 20 MG: 20 TABLET ORAL at 18:21

## 2019-08-20 RX ADMIN — NIFEDIPINE 30 MG: 30 TABLET, FILM COATED, EXTENDED RELEASE ORAL at 05:06

## 2019-08-20 RX ADMIN — OXYCODONE HYDROCHLORIDE 10 MG: 10 TABLET ORAL at 00:43

## 2019-08-20 RX ADMIN — IBUPROFEN 800 MG: 800 TABLET, FILM COATED ORAL at 05:06

## 2019-08-20 RX ADMIN — FAMOTIDINE 20 MG: 20 TABLET ORAL at 05:06

## 2019-08-20 RX ADMIN — IBUPROFEN 800 MG: 800 TABLET, FILM COATED ORAL at 18:21

## 2019-08-20 RX ADMIN — ENALAPRIL MALEATE 10 MG: 10 TABLET ORAL at 05:06

## 2019-08-20 RX ADMIN — ENALAPRILAT 1.25 MG: 1.25 INJECTION INTRAVENOUS at 00:45

## 2019-08-20 RX ADMIN — SENNOSIDES, DOCUSATE SODIUM 2 TABLET: 50; 8.6 TABLET, FILM COATED ORAL at 00:43

## 2019-08-20 RX ADMIN — IBUPROFEN 800 MG: 800 TABLET, FILM COATED ORAL at 11:59

## 2019-08-20 RX ADMIN — TAMSULOSIN HYDROCHLORIDE 0.4 MG: 0.4 CAPSULE ORAL at 05:06

## 2019-08-20 RX ADMIN — FAMOTIDINE 20 MG: 20 TABLET ORAL at 00:43

## 2019-08-20 RX ADMIN — ENOXAPARIN SODIUM 40 MG: 100 INJECTION SUBCUTANEOUS at 05:05

## 2019-08-20 RX ADMIN — ATORVASTATIN CALCIUM 10 MG: 10 TABLET, FILM COATED ORAL at 20:43

## 2019-08-20 RX ADMIN — POTASSIUM CHLORIDE 40 MEQ: 20 TABLET, EXTENDED RELEASE ORAL at 05:06

## 2019-08-20 RX ADMIN — ASPIRIN 325 MG: 325 TABLET, FILM COATED ORAL at 05:06

## 2019-08-20 RX ADMIN — POTASSIUM CHLORIDE 40 MEQ: 20 TABLET, EXTENDED RELEASE ORAL at 00:43

## 2019-08-20 ASSESSMENT — PATIENT HEALTH QUESTIONNAIRE - PHQ9
2. FEELING DOWN, DEPRESSED, IRRITABLE, OR HOPELESS: NOT AT ALL
1. LITTLE INTEREST OR PLEASURE IN DOING THINGS: NOT AT ALL
SUM OF ALL RESPONSES TO PHQ9 QUESTIONS 1 AND 2: 0

## 2019-08-20 ASSESSMENT — ENCOUNTER SYMPTOMS
NERVOUS/ANXIOUS: 0
FOCAL WEAKNESS: 0
FLANK PAIN: 0
WEAKNESS: 0
SPEECH CHANGE: 0
SHORTNESS OF BREATH: 0
DIAPHORESIS: 0
CHILLS: 0
PALPITATIONS: 0
ABDOMINAL PAIN: 1
SENSORY CHANGE: 0
VOMITING: 0
COUGH: 0
DIZZINESS: 0
MYALGIAS: 1
HEADACHES: 0
NAUSEA: 0
HEARTBURN: 0
MEMORY LOSS: 0
BACK PAIN: 0
FEVER: 0
DEPRESSION: 0

## 2019-08-20 ASSESSMENT — COGNITIVE AND FUNCTIONAL STATUS - GENERAL
MOBILITY SCORE: 24
SUGGESTED CMS G CODE MODIFIER DAILY ACTIVITY: CH
SUGGESTED CMS G CODE MODIFIER MOBILITY: CH
DAILY ACTIVITIY SCORE: 24

## 2019-08-20 ASSESSMENT — LIFESTYLE VARIABLES
AVERAGE NUMBER OF DAYS PER WEEK YOU HAVE A DRINK CONTAINING ALCOHOL: 0
HAVE PEOPLE ANNOYED YOU BY CRITICIZING YOUR DRINKING: NO
HAVE YOU EVER FELT YOU SHOULD CUT DOWN ON YOUR DRINKING: NO
ALCOHOL_USE: NO
TOTAL SCORE: 0
DOES PATIENT WANT TO STOP DRINKING: CANNOT ASSESS
TOTAL SCORE: 0
EVER FELT BAD OR GUILTY ABOUT YOUR DRINKING: NO
EVER_SMOKED: YES
ON A TYPICAL DAY WHEN YOU DRINK ALCOHOL HOW MANY DRINKS DO YOU HAVE: 0
CONSUMPTION TOTAL: NEGATIVE
TOTAL SCORE: 0
HOW MANY TIMES IN THE PAST YEAR HAVE YOU HAD 5 OR MORE DRINKS IN A DAY: 0
EVER HAD A DRINK FIRST THING IN THE MORNING TO STEADY YOUR NERVES TO GET RID OF A HANGOVER: NO

## 2019-08-20 ASSESSMENT — COPD QUESTIONNAIRES
DURING THE PAST 4 WEEKS HOW MUCH DID YOU FEEL SHORT OF BREATH: NONE/LITTLE OF THE TIME
COPD SCREENING SCORE: 2
DO YOU EVER COUGH UP ANY MUCUS OR PHLEGM?: NO/ONLY WITH OCCASIONAL COLDS OR INFECTIONS
HAVE YOU SMOKED AT LEAST 100 CIGARETTES IN YOUR ENTIRE LIFE: NO/DON'T KNOW
IN THE PAST 12 MONTHS DO YOU DO LESS THAN YOU USED TO BECAUSE OF YOUR BREATHING PROBLEMS: DISAGREE/UNSURE

## 2019-08-20 NOTE — PROGRESS NOTES
Notified by monitor tech of heart rate sustaining in the 30's. Patient is asymptomatic. Notified Dr. Kiser, orders for EKG.

## 2019-08-20 NOTE — H&P
Hospital Medicine History & Physical Note    Date of Service  8/19/2019    Primary Care Physician  Dennis Guajardo M.D.    Consultants  None    Code Status  Full    Chief Complaint  Chest pain    History of Presenting Illness  69 y.o. male who presented 8/19/2019 with chest pain.  Patient does have a history of coronary artery disease and CABG.  He does states his chest pain began today while he was walking.  Its associated with shortness of breath.  He denies any nausea or vomiting.  He states the pain is located over his left breast, 7/10 at its worst.  He also states the pain seems to be worse whenever he gets cold.  He also complained of constipation.  I did discuss the case including labs and imaging with the ER physician.    Review of Systems  Review of Systems   Constitutional: Negative for chills, fever and malaise/fatigue.   HENT: Negative for congestion.    Respiratory: Positive for shortness of breath. Negative for cough, sputum production and stridor.    Cardiovascular: Positive for chest pain. Negative for palpitations and leg swelling.   Gastrointestinal: Positive for constipation. Negative for abdominal pain, diarrhea, nausea and vomiting.   Genitourinary: Negative for dysuria and urgency.   Musculoskeletal: Negative for falls and myalgias.        Cold feet   Neurological: Negative for dizziness, tingling, loss of consciousness, weakness and headaches.   Psychiatric/Behavioral: Negative for depression and suicidal ideas.   All other systems reviewed and are negative.      Past Medical History   has a past medical history of AAA (abdominal aortic aneurysm) ([facility]) (04/2017), Abdominal pain, Aneurysm ([facility]), Back pain, BPH (benign prostatic hyperplasia), CAD (coronary artery disease) (2009), Eye pain, Fatigue, Hyperlipidemia, Hypertension, Lumps on the skin, MI (myocardial infarction) ([facility]) (2013), Snoring, Vision loss, and Weakness.    Surgical History   has a past surgical history that includes  abdominal aortic aneurysm (N/A, 4/11/2017) and multiple coronary artery bypass (2009).     Family History  family history includes Diabetes in his brother and mother; Hypertension in his sister; Lung Cancer in his father.     Social History   reports that he quit smoking about 10 years ago. His smoking use included cigarettes. He has a 25.00 pack-year smoking history. He has never used smokeless tobacco. He reports that he does not drink alcohol or use drugs.    Allergies  No Known Allergies    Medications  Prior to Admission Medications   Prescriptions Last Dose Informant Patient Reported? Taking?   NIFEdipine (ADALAT CC) 30 MG CR tablet 8/19/2019 at 0730 Patient Yes Yes   Sig: Take 30 mg by mouth every day.   aspirin (ASA) 325 MG Tab 8/19/2019 at 0730 Patient Yes No   Sig: Take 325 mg by mouth every day.   atorvastatin (LIPITOR) 10 MG Tab 8/19/2019 at 0730 Patient Yes Yes   Sig: Take 10 mg by mouth every evening.   enalapril (VASOTEC) 10 MG Tab 8/19/2019 at 0730 Patient Yes Yes   Sig: Take 10 mg by mouth every day.   metoprolol (LOPRESSOR) 100 MG Tab 8/19/2019 at 0730 Patient Yes No   Sig: Take 100 mg by mouth 2 times a day.   raNITidine (ZANTAC) 150 MG Tab 8/19/2019 at 0730 Patient Yes Yes   Sig: Take 150 mg by mouth 2 times a day.   tamsulosin (FLOMAX) 0.4 MG capsule 8/19/2019 at 0730 Patient Yes No   Sig: Take 0.4 mg by mouth every day.      Facility-Administered Medications: None       Physical Exam  Temp:  [36.3 °C (97.3 °F)] 36.3 °C (97.3 °F)  Pulse:  [54-58] 54  Resp:  [16] 16  BP: (151-180)/(70-95) 151/70  SpO2:  [97 %-100 %] 97 %    Physical Exam   Constitutional: He is oriented to person, place, and time. He appears well-developed and well-nourished.  Non-toxic appearance. No distress.   HENT:   Head: Normocephalic and atraumatic. Not macrocephalic and not microcephalic. Head is without raccoon's eyes and without Espinosa's sign.   Right Ear: External ear normal.   Left Ear: External ear normal.    Mouth/Throat: Oropharynx is clear and moist. No oropharyngeal exudate.   Eyes: Conjunctivae are normal. Right eye exhibits no discharge. Left eye exhibits no discharge. No scleral icterus.   Neck: Normal range of motion. Neck supple. No tracheal deviation, no edema and no erythema present.   Cardiovascular: Normal rate, regular rhythm and intact distal pulses. Exam reveals no gallop, no friction rub and no decreased pulses.   Murmur heard.  Pulmonary/Chest: Effort normal and breath sounds normal. No stridor. No respiratory distress. He has no decreased breath sounds. He has no wheezes. He has no rhonchi. He has no rales. He exhibits tenderness.   Abdominal: Soft. He exhibits no distension. Bowel sounds are decreased. There is no splenomegaly or hepatomegaly. There is generalized tenderness. There is no rebound and no guarding.   Musculoskeletal: Normal range of motion. He exhibits no edema, tenderness or deformity.   Lymphadenopathy:     He has no cervical adenopathy.   Neurological: He is alert and oriented to person, place, and time. No cranial nerve deficit. Coordination normal.   Skin: Skin is warm and dry. No rash noted. He is not diaphoretic. No cyanosis or erythema. No pallor. Nails show no clubbing.   Psychiatric: He has a normal mood and affect. His speech is normal and behavior is normal. Judgment and thought content normal. Cognition and memory are normal.   Nursing note and vitals reviewed.      Laboratory:  Recent Labs     08/19/19 2004   WBC 8.6   RBC 5.90   HEMOGLOBIN 16.7   HEMATOCRIT 51.2   MCV 86.8   MCH 28.3   MCHC 32.6*   RDW 43.7   PLATELETCT 156*   MPV 12.2     Recent Labs     08/19/19 2004   SODIUM 140   POTASSIUM 3.4*   CHLORIDE 103   CO2 28   GLUCOSE 112*   BUN 19   CREATININE 1.31   CALCIUM 9.2     Recent Labs     08/19/19 2004   ALTSGPT 12   ASTSGOT 14   ALKPHOSPHAT 94   TBILIRUBIN 0.7   LIPASE 32   GLUCOSE 112*         No results for input(s): NTPROBNP in the last 72 hours.       Recent Labs     08/19/19 2004   TROPONINT 52*       Urinalysis:    No results found     Imaging:  DX-CHEST-PORTABLE (1 VIEW)   Final Result      No acute cardiopulmonary findings.      EC-ECHOCARDIOGRAM COMPLETE W/O CONT    (Results Pending)         Assessment/Plan:  I anticipate this patient is appropriate for observation status at this time.    * Chest pain- (present on admission)  Assessment & Plan  -Patient stated started with walking  -This pain is reproducible on exam  -I do feel it is likely costochondritis, starting scheduled ibuprofen  -He did have a stress test within 2 years, showed a fixed defect and severe global hypokinesis, no echocardiogram after this  -Troponin is mildly elevated, continue to trend  -I did personally review his EKG, noted T wave inversions and Q waves, I then compared to most recent EKG in 2017 and again noted T wave inversions and Q waves however these were not slightly different leads  -Repeat EKG  -Obtain echocardiogram    Mixed hyperlipidemia- (present on admission)  Assessment & Plan  -Continue home statin    Hypertension- (present on admission)  Assessment & Plan  -Continue home nifedipine, metoprolol and enalapril  -Place PRN enalapril  -Adjust as needed    CAD (coronary artery disease)- (present on admission)  Assessment & Plan  -Medical management  -Closely monitor to ensure he is not having another acute event, he is at high risk for ACS    Thrombocytopenia (HCC)  Assessment & Plan  -Chronic, no sign of bleeding    Polycythemia  Assessment & Plan  -Looking back at his labs, this is somewhat chronic  -At this point in time he is not hypoxic nor does he appear dehydrated    Chronic renal disease  Assessment & Plan  -At his baseline  -Repeat BMP in the morning    Hypokalemia- (present on admission)  Assessment & Plan  -Start oral potassium  -Repeat BMP in the morning    Benign prostatic hyperplasia without lower urinary tract symptoms- (present on admission)  Assessment &  Plan  -Continue Flomax      VTE prophylaxis: Lovenox

## 2019-08-20 NOTE — PROGRESS NOTES
Care of patient assumed after report. Patient resting in bed, no distress noted. Discussed plan of care with patient, all questions answered. Will continue to monitor.

## 2019-08-20 NOTE — ED TRIAGE NOTES
Chief Complaint   Patient presents with   • Abdominal Pain   • Constipation     Pt c/o generalized abdominal pain that radiates into his chest.  Describes the pain as bloating that decreases when he burps.  States pain is 6/10 and began while he was walking at 1000 this morning.  States has mild SOB and nausea with pain.  Denies diarrhea.  No diaphoresis.  Triage process explained to patient.  Pt back to waiting room.  Pt instructed to inform RN if any changes or questions arise.

## 2019-08-20 NOTE — ASSESSMENT & PLAN NOTE
-Looking back at his labs, this is somewhat chronic  -At this point in time he is not hypoxic nor does he appear dehydrated

## 2019-08-20 NOTE — ED NOTES
Pt ambulatory to room with steady gait. Attached to monitor, changed into gown, call bell in reach. Agree with triage note. Protocol ordered and labs sent in triage.

## 2019-08-20 NOTE — ASSESSMENT & PLAN NOTE
8/19 Patient stated started with walking  -This pain is reproducible on exam  -I do feel it is likely costochondritis, starting scheduled ibuprofen  -He did have a stress test within 2 years, showed a fixed defect and severe global hypokinesis, no echocardiogram after this  -Troponin is mildly elevated, continue to trend  -I did personally review his EKG, noted T wave inversions and Q waves, I then compared to most recent EKG in 2017 and again noted T wave inversions and Q waves however these were not slightly different leads    8/20   -Repeat EKG- with LBB, sinus bradycardia  Labile troponin, at 53 now  We will consult cardiology  Consider stress testing for risk stratification  -Follow-up echocardiogram    8/21 large inferior lateral defect on stress test  No reversible ischemia with ejection fraction of 35%  Continue medication optimization  Troponin trending down    8/22 encourage activity  Continue medication management  Resolved

## 2019-08-20 NOTE — ED NOTES
Med rec updated and complete  Allergies reviewed. Pt denies antibiotic use in  Last 14 days.  All morning doses taken.  Home pharmacy community on [address].

## 2019-08-20 NOTE — CARE PLAN
"  Problem: Safety  Goal: Will remain free from injury  Note:   Patient up ambulating independently. Steady on feet. Ambulated around hallways.      Problem: Knowledge Deficit  Goal: Knowledge of disease process/condition, treatment plan, diagnostic tests, and medications will improve  Note:   Patient aware of plan of care at this time.      Problem: Pain Management  Goal: Pain level will decrease to patient's comfort goal  Intervention: Follow pain managment plan developed in collaboration with patient and Interdisciplinary Team  Note:   Patient complaining of abdominal pain, states it \"feels like gas\". Patient ambulating around unit to see if that will help.      "

## 2019-08-20 NOTE — PROGRESS NOTES
[facility] Medicine Daily Progress Note    Date of Service  8/20/2019    Chief Complaint  69 y.o. male admitted 8/19/2019 with history of CAD, abdominal aortic aneurysm now presents with complaints of chest pain, dyspnea on exertion with elevated troponin and abnormal EKG.    Hospital Course    69 y.o. male admitted 8/19/2019 with history of CAD, abdominal aortic aneurysm now presents with complaints of chest pain, dyspnea on exertion with elevated troponin and abnormal EKG.      Interval Problem Update  Intermittent CP, reproducible  Min cough  Denies LE pain  Or edema  translater used for exam    poc reviewed in detail    Consultants/Specialty  Cardiology     Code Status  full    Disposition  TBD      Review of Systems  Review of Systems   Constitutional: Negative for chills, diaphoresis, fever and malaise/fatigue.   HENT: Negative for congestion and hearing loss.    Respiratory: Negative for cough and shortness of breath.    Cardiovascular: Positive for chest pain. Negative for palpitations and leg swelling.   Gastrointestinal: Positive for abdominal pain. Negative for heartburn, nausea and vomiting.   Genitourinary: Negative for dysuria and flank pain.   Musculoskeletal: Positive for myalgias. Negative for back pain and joint pain.   Neurological: Negative for dizziness, sensory change, speech change, focal weakness, weakness and headaches.   Psychiatric/Behavioral: Negative for depression and memory loss. The patient is not nervous/anxious.         Physical Exam  Temp:  [36 °C (96.8 °F)-37.1 °C (98.8 °F)] 37.1 °C (98.8 °F)  Pulse:  [41-62] 55  Resp:  [14-18] 14  BP: (145-180)/(70-95) 152/89  SpO2:  [93 %-100 %] 98 %    Physical Exam   Constitutional: He is oriented to person, place, and time. No distress.   HENT:   Head: Normocephalic and atraumatic.   Nose: Nose normal.   Mouth/Throat: No oropharyngeal exudate.   Eyes: Pupils are equal, round, and reactive to light. EOM are normal. Right eye exhibits no  discharge. Left eye exhibits no discharge.   Neck: Neck supple. No JVD present. No thyromegaly present.   Cardiovascular: Normal rate and intact distal pulses.   No murmur heard.  Pulmonary/Chest: Breath sounds normal. No respiratory distress. He has no wheezes. He exhibits tenderness.   Abdominal: Soft. Bowel sounds are normal. He exhibits no distension. There is no tenderness.   Musculoskeletal: He exhibits no edema or tenderness.   Neurological: He is alert and oriented to person, place, and time. No cranial nerve deficit.   Skin: Skin is warm and dry. No rash noted. He is not diaphoretic. No erythema.   Psychiatric: He has a normal mood and affect. His behavior is normal. Judgment and thought content normal.   Nursing note and vitals reviewed.      Fluids    Intake/Output Summary (Last 24 hours) at 8/20/2019 1129  Last data filed at 8/20/2019 0200  Gross per 24 hour   Intake 120 ml   Output 1200 ml   Net -1080 ml       Laboratory  Recent Labs     08/19/19 2004   WBC 8.6   RBC 5.90   HEMOGLOBIN 16.7   HEMATOCRIT 51.2   MCV 86.8   MCH 28.3   MCHC 32.6*   RDW 43.7   PLATELETCT 156*   MPV 12.2     Recent Labs     08/19/19 2004 08/20/19  0509   SODIUM 140 140   POTASSIUM 3.4* 4.0   CHLORIDE 103 105   CO2 28 29   GLUCOSE 112* 115*   BUN 19 16   CREATININE 1.31 1.14   CALCIUM 9.2 9.1                   Imaging      Assessment/Plan  * Chest pain- (present on admission)  Assessment & Plan  8/19 Patient stated started with walking  -This pain is reproducible on exam  -I do feel it is likely costochondritis, starting scheduled ibuprofen  -He did have a stress test within 2 years, showed a fixed defect and severe global hypokinesis, no echocardiogram after this  -Troponin is mildly elevated, continue to trend  -I did personally review his EKG, noted T wave inversions and Q waves, I then compared to most recent EKG in 2017 and again noted T wave inversions and Q waves however these were not slightly different leads    8/20    -Repeat EKG- with LBB, sinus bradycardia  Labile troponin, at 53 now  We will consult cardiology  Consider stress testing for risk stratification  -Follow-up echocardiogram    Mixed hyperlipidemia- (present on admission)  Assessment & Plan  -Continue home statin    Hypertension- (present on admission)  Assessment & Plan  -Continue home nifedipine, metoprolol and enalapril  -Place PRN enalapril  -Adjust as needed    CAD (coronary artery disease)- (present on admission)  Assessment & Plan  -Medical management  -Closely monitor to ensure he is not having another acute event, he is at high risk for ACS    Thrombocytopenia (HCC)  Assessment & Plan  -Chronic, no sign of bleeding    Polycythemia  Assessment & Plan  -Looking back at his labs, this is somewhat chronic  -At this point in time he is not hypoxic nor does he appear dehydrated    Chronic renal disease  Assessment & Plan  -At his baseline  -Repeat BMP in the morning    Hypokalemia- (present on admission)  Assessment & Plan  -Start oral potassium  -Repeat BMP in the morning    Benign prostatic hyperplasia without lower urinary tract symptoms- (present on admission)  Assessment & Plan  -Continue Flomax         VTE prophylaxis: lovenox

## 2019-08-20 NOTE — CONSULTS
Reason for Consult:  Asked by Dr Samara Moreland D.O. to see this patient with Dr. Henderson  Patient's PCP: Dennis Guajardo M.D.    CC:   Chief Complaint   Patient presents with   • Abdominal Pain   • Constipation       HPI:  Mr. De La Cruz is a 70 yo Icelandic-speaking male with PMH of CABG x3 (2009) and AAA s/p repair (2017) who presents with chest pain. He says that the pain started yesterday while he was walking. He says that the pain also occurs after a meal: he develops stomach bloating that travels up to his left chest and he feels his left carotid artery pulsating. No chest palpitations, syncope, orthopnea, PND. His troponins have been indeterminate range (52 -> 31 -> 53). EKG showed sinus bradycardia, prolonged VT, LVH. Prior nuclear stress test from 2017 showed fixed inferolateral defect and global hypokinesis.    Past Medical History:   Diagnosis Date   • AAA (abdominal aortic aneurysm) ([facility]) 04/2017    Status post repair by Dr. Kerr.   • Abdominal pain    • Aneurysm ([facility])    • Back pain    • BPH (benign prostatic hyperplasia)    • CAD (coronary artery disease) 2009    Status post CABG x 3, in [place]   • Eye pain    • Fatigue    • Hyperlipidemia    • Hypertension    • Lumps on the skin    • MI (myocardial infarction) ([facility]) 2013   • Snoring    • Vision loss    • Weakness        Past Surgical History:   Procedure Laterality Date   • ABDOMINAL AORTIC ANEURYSM N/A 4/11/2017    Procedure: ABDOMINAL AORTIC ANEURYSM;  Surgeon: Charissa Kerr M.D.;  Location: SURGERY [facility];  Service:    • MULTIPLE CORONARY ARTERY BYPASS  2009    CABG x 3, in [place]       Family History   Problem Relation Age of Onset   • Diabetes Mother    • Lung Cancer Father    • Hypertension Sister    • Diabetes Brother      Patient family history was personally reviewed, no pertinent family history to current presentation    Social History     Socioeconomic History   • Marital status:      Spouse name: Not on file   • Number of  children: Not on file   • Years of education: Not on file   • Highest education level: Not on file   Occupational History   • Not on file   Social Needs   • Financial resource strain: Not on file   • Food insecurity:     Worry: Not on file     Inability: Not on file   • Transportation needs:     Medical: Not on file     Non-medical: Not on file   Tobacco Use   • Smoking status: Former Smoker     Packs/day: 1.00     Years: 25.00     Pack years: 25.00     Types: Cigarettes     Last attempt to quit: 3/2/2009     Years since quitting: 10.4   • Smokeless tobacco: Never Used   Substance and Sexual Activity   • Alcohol use: No     Comment: use to drink 2 beers a day quit 03/2009   • Drug use: No   • Sexual activity: Not Currently   Lifestyle   • Physical activity:     Days per week: Not on file     Minutes per session: Not on file   • Stress: Not on file   Relationships   • Social connections:     Talks on phone: Not on file     Gets together: Not on file     Attends [employer] service: Not on file     Active member of club or organization: Not on file     Attends meetings of clubs or organizations: Not on file     Relationship status: Not on file   • Intimate partner violence:     Fear of current or ex partner: Not on file     Emotionally abused: Not on file     Physically abused: Not on file     Forced sexual activity: Not on file   Other Topics Concern   • Not on file   Social History Narrative   • Not on file       ALLERGIES:  No Known Allergies    Review of systems:  Constitutional: Negative for chills and fever.   HENT: Negative for ear pain and sore throat.    Eyes: Negative for discharge and redness.   Respiratory: Negative for cough, hemoptysis, wheezing and stridor.    Cardiovascular: +chest pain. Negative for palpitations and leg swelling.   Gastrointestinal: +abdominal pain, constipation. No diarrhea, heartburn, nausea and vomiting.   Genitourinary: Negative for dysuria, flank pain and hematuria.    Musculoskeletal: +back pain. Negative for falls and myalgias.   Skin: Negative for itching and rash.   Neurological: Negative for dizziness, seizures, loss of consciousness and headaches.   Endo/Heme/Allergies: Negative for polydipsia. Does not bruise/bleed easily.   Psychiatric/Behavioral: Negative for substance abuse and suicidal ideas.     Physical exam:  Vitals:    19 0004 19 0400 19 0800 19 1200   BP: (!) 168/84 145/70 152/89    Pulse: 61 (!) 52 (!) 55 (!) 59   Resp: 18 16 14 18   Temp: 36 °C (96.8 °F) 36.7 °C (98 °F) 37.1 °C (98.8 °F) 36.8 °C (98.2 °F)   TempSrc: Temporal Temporal Temporal Temporal   SpO2: 93% 95% 98% 96%   Weight: 79.7 kg (175 lb 11.3 oz)      Height:         Physical Exam  Gen: non-toxic appearing, resting comfortably, in NAD  HEENT: NC/AT, no scleral icterus, no conjunctival injection, mucous membranes pink and dry.  Neck: Supple, no lymphadenopathy.  Cardiac: Pain with palpation of left chest wall. S1S2, RRR, no m/r/g, no JVD.  Respiratory: Normal effort, symmetrical, CTA b/l.  Abdomen: BS+, soft, tenderness that improves with palpation to LLQ, no rebound/guarding, no palpable organomegaly.  Ext: No edema, 2+ DP pulses b/l.  Skin: Warm, dry. No rashes or erythema.   Neuro: AAOx4, CN II-XII grossly normal, no focal sensory or motor deficits.   Psych: Affect, mood, judgement normal.    Data:  Laboratory studies personally reviewed by me:  Recent Results (from the past 24 hour(s))   EKG (NOW)    Collection Time: 19  6:16 PM   Result Value Ref Range    Report       [facility] Emergency Dept.    Test Date:  2019  Pt Name:    SAMUEL VILLASENOR        Department: ER  MRN:        [id number]                      Room:  Gender:     Male                         Technician: [id number]  :        1950                   Requested By:ER TRIAGE PROTOCOL  Order #:    [id number]                    Alda MD: Kenney Greco,  MD    Measurements  Intervals                                Axis  Rate:       58                           P:          52  MN:         248                          QRS:        3  QRSD:       116                          T:          -83  QT:         444  QTc:        437    Interpretive Statements  SINUS BRADYCARDIA  FIRST DEGREE AV BLOCK  LVH WITH IVCD AND SECONDARY REPOL ABNRM  INFERIOR INFARCT, AGE INDETERMINATE  LATERAL INFARCT, AGE INDETERMINATE  Compared to ECG 07/19/2017 19:46:21  Sinus rhythm no longer present  Myocardial infarct finding still present    Electronically Signed On 8- 22:00:31 PDT by  Kenney Greco MD     CBC with Differential    Collection Time: 08/19/19  8:04 PM   Result Value Ref Range    WBC 8.6 4.8 - 10.8 K/uL    RBC 5.90 4.70 - 6.10 M/uL    Hemoglobin 16.7 14.0 - 18.0 g/dL    Hematocrit 51.2 42.0 - 52.0 %    MCV 86.8 81.4 - 97.8 fL    MCH 28.3 27.0 - 33.0 pg    MCHC 32.6 (L) 33.7 - 35.3 g/dL    RDW 43.7 35.9 - 50.0 fL    Platelet Count 156 (L) 164 - 446 K/uL    MPV 12.2 9.0 - 12.9 fL    Neutrophils-Polys 59.20 44.00 - 72.00 %    Lymphocytes 29.10 22.00 - 41.00 %    Monocytes 7.50 0.00 - 13.40 %    Eosinophils 3.40 0.00 - 6.90 %    Basophils 0.60 0.00 - 1.80 %    Immature Granulocytes 0.20 0.00 - 0.90 %    Nucleated RBC 0.00 /100 WBC    Neutrophils (Absolute) 5.06 1.82 - 7.42 K/uL    Lymphs (Absolute) 2.49 1.00 - 4.80 K/uL    Monos (Absolute) 0.64 0.00 - 0.85 K/uL    Eos (Absolute) 0.29 0.00 - 0.51 K/uL    Baso (Absolute) 0.05 0.00 - 0.12 K/uL    Immature Granulocytes (abs) 0.02 0.00 - 0.11 K/uL    NRBC (Absolute) 0.00 K/uL   Complete Metabolic Panel (CMP)    Collection Time: 08/19/19  8:04 PM   Result Value Ref Range    Sodium 140 135 - 145 mmol/L    Potassium 3.4 (L) 3.6 - 5.5 mmol/L    Chloride 103 96 - 112 mmol/L    Co2 28 20 - 33 mmol/L    Anion Gap 9.0 0.0 - 11.9    Glucose 112 (H) 65 - 99 mg/dL    Bun 19 8 - 22 mg/dL    Creatinine 1.31 0.50 - 1.40 mg/dL    Calcium 9.2 8.5 -  10.5 mg/dL    AST(SGOT) 14 12 - 45 U/L    ALT(SGPT) 12 2 - 50 U/L    Alkaline Phosphatase 94 30 - 99 U/L    Total Bilirubin 0.7 0.1 - 1.5 mg/dL    Albumin 4.2 3.2 - 4.9 g/dL    Total Protein 7.7 6.0 - 8.2 g/dL    Globulin 3.5 1.9 - 3.5 g/dL    A-G Ratio 1.2 g/dL   Troponin    Collection Time: 19  8:04 PM   Result Value Ref Range    Troponin T 52 (H) 6 - 19 ng/L   LIPASE    Collection Time: 19  8:04 PM   Result Value Ref Range    Lipase 32 11 - 82 U/L   ESTIMATED GFR    Collection Time: 19  8:04 PM   Result Value Ref Range    GFR If African American >60 >60 mL/min/1.73 m 2    GFR If Non African American 55 (A) >60 mL/min/1.73 m 2   TROPONIN    Collection Time: 19 11:29 PM   Result Value Ref Range    Troponin T 31 (H) 6 - 19 ng/L   EKG in four (4) hours    Collection Time: 19 12:36 AM   Result Value Ref Range    Report       [facility]    Test Date:  2019  Pt Name:    SAMUEL VILLASENOR        Department: 171  MRN:        [id number]                      Room:       T733  Gender:     Male                         Technician: SUNNY  :        1950                   Requested By:MARY WEEKS  Order #:    [id number]                    Reading MD:    Measurements  Intervals                                Axis  Rate:       62                           P:          45  DC:         255                          QRS:        -38  QRSD:       129                          T:          156  QT:         443  QTc:        450    Interpretive Statements  SINUS RHYTHM  PROLONGED DC INTERVAL  PROBABLE LEFT ATRIAL ENLARGEMENT  LEFT BUNDLE BRANCH BLOCK  Compared to ECG 2019 18:16:22  Left bundle-branch block now present  Sinus bradycardia no longer present  Intraventricular conduction delay no longer present  Left ventricular hypertrophy no longer present  Early repolarization no longer present  Myocardi al infarct finding no longer present     EKG    Collection Time: 19  2:42 AM    Result Value Ref Range    Report       [facility]    Test Date:  2019  Pt Name:    SAMUEL VILLASENOR        Department: 171  MRN:        [id number]                      Room:       T733  Gender:     Male                         Technician: SUNNY  :        1950                   Requested By:JAE VALERIO  Order #:    [id number]                    Reading MD:    Measurements  Intervals                                Axis  Rate:       50                           P:          40  KY:         249                          QRS:        -28  QRSD:       127                          T:          163  QT:         473  QTc:        432    Interpretive Statements  SINUS BRADYCARDIA  PROLONGED [place] INTERVAL  LVH WITH SECONDARY REPOLARIZATION ABNORMALITY  INFERIOR INFARCT, OLD  ANTERIOR ST ELEVATION, PROBABLY DUE TO LVH  Compared to ECG 2019 00:36:03  Left ventricular hypertrophy now present  Early repolarization now present  Myocardial infarct finding now present  ST (T wave) deviation now present  Sinus  rhythm no longer present  Left bundle-branch block no longer present     Basic Metabolic Panel (BMP)    Collection Time: 19  5:09 AM   Result Value Ref Range    Sodium 140 135 - 145 mmol/L    Potassium 4.0 3.6 - 5.5 mmol/L    Chloride 105 96 - 112 mmol/L    Co2 29 20 - 33 mmol/L    Glucose 115 (H) 65 - 99 mg/dL    Bun 16 8 - 22 mg/dL    Creatinine 1.14 0.50 - 1.40 mg/dL    Calcium 9.1 8.5 - 10.5 mg/dL    Anion Gap 6.0 0.0 - 11.9   TROPONIN    Collection Time: 19  5:09 AM   Result Value Ref Range    Troponin T 53 (H) 6 - 19 ng/L   ESTIMATED GFR    Collection Time: 19  5:09 AM   Result Value Ref Range    GFR If African American >60 >60 mL/min/1.73 m 2    GFR If Non African American >60 >60 mL/min/1.73 m 2   TROPONIN    Collection Time: 19 11:46 AM   Result Value Ref Range    Troponin T 51 (H) 6 - 19 ng/L       Imaging:  DX-CHEST-PORTABLE (1 VIEW)   Final Result      No  acute cardiopulmonary findings.      EC-ECHOCARDIOGRAM COMPLETE W/O CONT    (Results Pending)       EKG : personally reviewed by me sinus bradycardia, prolonged LA, LVH    All pertinent features of laboratory and imaging reviewed including primary images where applicable      Principal Problem:    Chest pain POA: Yes  Active Problems:    CAD (coronary artery disease) POA: Yes      Overview: 2009: CABG x 3 in [place].    Hypertension POA: Yes    Mixed hyperlipidemia POA: Yes    Hypokalemia POA: Yes    Chronic renal disease POA: Unknown    Polycythemia POA: Unknown    Thrombocytopenia (HCC) POA: Unknown    Benign prostatic hyperplasia without lower urinary tract symptoms POA: Yes  Resolved Problems:    * No resolved hospital problems. *      Assessment / Plan:  #Stable CAD  Patient with non-anginal chest pain. Reproducible so likely costochondritis, though also occurs with meals and accompanying bloating so could be GI issue. Troponin mild elevation likely troponin leak from his LVH. EKG non concerning. Many of his symptoms, such as the neck pulsations, may be attributed to his high blood pressure.  - Given he has known CAD and his last stress test was more than 2 years ago, will evaluate with nuclear stress test  - Check lipid panel    #HTN  His systolics have been 145-180  - Increase enalapril to 20mg  - If needed can further increase to 40mg, or go up on nifedipine    No future appointments.    It is my pleasure to participate in the care of Mr. Jono Antony. We will continue to follow. Please do not hesitate to contact me with questions or concerns.    8/20/2019    Marta Pillai MD  Attending: Sakina Henderson MD

## 2019-08-20 NOTE — PROGRESS NOTES
2 RN skin check complete with LITZY Aguilar  -Devices in place N/A  -Skin assessed under devices N/A  -Confirmed pressure ulcers found on N/A  -New potential pressure ulcers noted on N/A.   -The following interventions in place: Patient turns independently.    -Ears are pink and blanching  -Elbows are pink and blanching  -Sacrum is pink and blanching  -Heels are pink and blanching    No areas of concern, patient turns independently

## 2019-08-20 NOTE — ED PROVIDER NOTES
ED Provider Note    ER Provider Note         CHIEF COMPLAINT  Chief Complaint   Patient presents with   • Abdominal Pain   • Constipation       HPI  Danny Antony is a 69 y.o. male who presents to the Emergency Department for evaluation of acute, radiating, intermittent chest pain onset two weeks ago with a recurrent episode today that had worsening severity prompting him to visit the ED. The patient reports that his chest pain radiates downwards to his left abdomen and upwards to the left side of his neck. Exacerbating factors include exertion. No alleviating factors were reported. Denies shortness of breath. Negative shortness of breath, coughing, fever, chills. He denies taking over the counter medications for his symptoms. He has no known allergies to medications.       REVIEW OF SYSTEMS  See [facility] for further details. All other systems are negative.     PAST MEDICAL HISTORY   has a past medical history of AAA (abdominal aortic aneurysm) (Formerly [facility]) (04/2017), Abdominal pain, Aneurysm (Formerly [facility]), Back pain, BPH (benign prostatic hyperplasia), CAD (coronary artery disease) (2009), Eye pain, Fatigue, Hyperlipidemia, Hypertension, Lumps on the skin, MI (myocardial infarction) (Formerly [facility]) (2013), Snoring, Vision loss, and Weakness.    SURGICAL HISTORY   has a past surgical history that includes abdominal aortic aneurysm (N/A, 4/11/2017) and multiple coronary artery bypass (2009).    SOCIAL HISTORY  Social History     Tobacco Use   • Smoking status: Former Smoker     Packs/day: 1.00     Years: 25.00     Pack years: 25.00     Types: Cigarettes     Last attempt to quit: 3/2/2009     Years since quitting: 10.4   • Smokeless tobacco: Never Used   Substance Use Topics   • Alcohol use: No     Comment: use to drink 2 beers a day quit 03/2009   • Drug use: No      Social History     Substance and Sexual Activity   Drug Use No       FAMILY HISTORY  Family History   Problem Relation Age of Onset   • Diabetes Mother    • Lung Cancer  "Father    • Hypertension Sister    • Diabetes Brother        CURRENT MEDICATIONS  Current Outpatient Medications:   •  pantoprazole (PROTONIX) 40 MG Tablet Delayed Response, , Disp: , Rfl:   •  tamsulosin (FLOMAX) 0.4 MG capsule, , Disp: , Rfl:   •  losartan (COZAAR) 50 MG Tab, Take 1 Tab by mouth every day., Disp: 30 Tab, Rfl: 6  •  enalapril (VASOTEC) 10 MG Tab, Take 1 Tab by mouth every day., Disp: 30 Tab, Rfl: 6  •  atorvastatin (LIPITOR) 10 MG Tab, Take 1 Tab by mouth every day., Disp: 30 Tab, Rfl: 6  •  NIFEdipine (ADALAT CC) 30 MG CR tablet, TAKE ONE TABLET BY MOUTH DAILY FOR HIGH BLOOD PRESSURE - F/U WITH PCP FOR REFILLS, Disp: , Rfl: 0  •  metoprolol (LOPRESSOR) 100 MG Tab, Take 100 mg by mouth 2 times a day., Disp: , Rfl:   •  aspirin (ASA) 325 MG Tab, Take 325 mg by mouth every 6 hours as needed for Mild Pain., Disp: , Rfl:     ALLERGIES  No Known Allergies    PHYSICAL EXAM  VITAL SIGNS: BP (!) 180/95   Pulse (!) 58   Temp 36.3 °C (97.3 °F) (Temporal)   Resp 16   Ht 1.854 m (6' 1\")   Wt 80 kg (176 lb 5.9 oz)   SpO2 100%   BMI 23.27 kg/m²       Constitutional: Alert in no apparent distress.  HENT: No signs of trauma, Bilateral external ears normal, Nose normal.   Eyes: Pupils are equal and reactive, Conjunctiva normal, Non-icteric.   Neck: Normal range of motion, No tenderness, Supple, No stridor.   Lymphatic: No lymphadenopathy noted.   Cardiovascular: Regular rate and rhythm, no murmurs.   Thorax & Lungs: Normal breath sounds, No respiratory distress, No wheezing, No chest tenderness.   Abdomen: Bowel sounds normal, Soft, No tenderness, No masses, No pulsatile masses. No peritoneal signs.  Skin: Warm, Dry, No erythema, No rash.   Back: No bony tenderness, No CVA tenderness.   Extremities: Intact distal pulses, No edema, No tenderness, No cyanosis.  Musculoskeletal: Good range of motion in all major joints. No tenderness to palpation or major deformities noted.   Neurologic: Alert , Normal motor " function, Normal sensory function, No focal deficits noted.   Psychiatric: Affect normal, Judgment normal, Mood normal.     DIAGNOSTIC STUDIES / PROCEDURES    EKG Interpretation:  Interpreted by me    Sinus bradycardia  Rate of 58  Normal axis  T-wave inversion in leads 2,3 and aVF    LABS  Results for orders placed or performed during the hospital encounter of 08/19/19   CBC with Differential   Result Value Ref Range    WBC 8.6 4.8 - 10.8 K/uL    RBC 5.90 4.70 - 6.10 M/uL    Hemoglobin 16.7 14.0 - 18.0 g/dL    Hematocrit 51.2 42.0 - 52.0 %    MCV 86.8 81.4 - 97.8 fL    MCH 28.3 27.0 - 33.0 pg    MCHC 32.6 (L) 33.7 - 35.3 g/dL    RDW 43.7 35.9 - 50.0 fL    Platelet Count 156 (L) 164 - 446 K/uL    MPV 12.2 9.0 - 12.9 fL    Neutrophils-Polys 59.20 44.00 - 72.00 %    Lymphocytes 29.10 22.00 - 41.00 %    Monocytes 7.50 0.00 - 13.40 %    Eosinophils 3.40 0.00 - 6.90 %    Basophils 0.60 0.00 - 1.80 %    Immature Granulocytes 0.20 0.00 - 0.90 %    Nucleated RBC 0.00 /100 WBC    Neutrophils (Absolute) 5.06 1.82 - 7.42 K/uL    Lymphs (Absolute) 2.49 1.00 - 4.80 K/uL    Monos (Absolute) 0.64 0.00 - 0.85 K/uL    Eos (Absolute) 0.29 0.00 - 0.51 K/uL    Baso (Absolute) 0.05 0.00 - 0.12 K/uL    Immature Granulocytes (abs) 0.02 0.00 - 0.11 K/uL    NRBC (Absolute) 0.00 K/uL   Complete Metabolic Panel (CMP)   Result Value Ref Range    Sodium 140 135 - 145 mmol/L    Potassium 3.4 (L) 3.6 - 5.5 mmol/L    Chloride 103 96 - 112 mmol/L    Co2 28 20 - 33 mmol/L    Anion Gap 9.0 0.0 - 11.9    Glucose 112 (H) 65 - 99 mg/dL    Bun 19 8 - 22 mg/dL    Creatinine 1.31 0.50 - 1.40 mg/dL    Calcium 9.2 8.5 - 10.5 mg/dL    AST(SGOT) 14 12 - 45 U/L    ALT(SGPT) 12 2 - 50 U/L    Alkaline Phosphatase 94 30 - 99 U/L    Total Bilirubin 0.7 0.1 - 1.5 mg/dL    Albumin 4.2 3.2 - 4.9 g/dL    Total Protein 7.7 6.0 - 8.2 g/dL    Globulin 3.5 1.9 - 3.5 g/dL    A-G Ratio 1.2 g/dL   Troponin   Result Value Ref Range    Troponin T 52 (H) 6 - 19 ng/L   LIPASE    Result Value Ref Range    Lipase 32 11 - 82 U/L   ESTIMATED GFR   Result Value Ref Range    GFR If African American >60 >60 mL/min/1.73 m 2    GFR If Non African American 55 (A) >60 mL/min/1.73 m 2       All labs reviewed by me.    RADIOLOGY  DX-CHEST-PORTABLE (1 VIEW)   Final Result      No acute cardiopulmonary findings.           The radiologist's interpretation of all radiological studies have been reviewed by me.    COURSE & MEDICAL DECISION MAKING  Pertinent Labs & Imaging studies reviewed. (See chart for details)    This is a 69 y.o. male that presents with chest pain.  There is some radiation down to his abdomen.  This is not predominantly abdominal pain.  I am concerned this could represent acute coronary syndrome versus pneumonia versus pneumothorax.  He does not have an acute abdomen on my exam.  We will perform the below work-up and will give the patient aspirin as well..     In triage, Ordered Lipase    6:27 PM Ordered EKG, troponin, CMP, CBC with differential, and Dx-chest.      8:04 PM Ordered estimated GFR    9:59 PM - Patient seen and examined at bedside. Plan of care was discussed with patient. Patient verbalizes his understanding and agreement to the plan of care.     10:01 PM Patient will be treated with Aspirin 325 mg    10:08 PM Paged Dr. Carcamo (Hospitalist)    10:12 PM I discussed the patient's case and the above findings with Dr. Carcamo (Hospitalist) who agrees to admit the patient.       Patient was found to have a slightly elevated troponin.  The lipase is negative.  He has no significant LFT abnormalities.  He has no leukocytosis.  Given his elevated heart score and indeterminate troponin my plan will be to admit the patient for further cardiac work-up.    DISPOSITION:  Patient will be admitted to Dr. Carcamo (Hospitalist) in guarded condition.      FINAL IMPRESSION  1. Left upper quadrant pain    2. Chest pain, unspecified type          C    Electronically signed by: Kenney Greco  8/19/2019

## 2019-08-20 NOTE — CARE PLAN
Problem: Safety  Goal: Will remain free from falls  Intervention: Implement fall precautions  Note:   Safety precautions and fall prevention in place. Fall prevention education provided. Patient verbalized understanding. Bed in low locked position, bed alarm on, treaded socks on patient, call bell within reach. Patient calls appropriately as needed.      Problem: Pain Management  Goal: Pain level will decrease to patient's comfort goal  Intervention: Educate and implement non-pharmacologic comfort measures. Examples: relaxation, distration, play therapy, activity therapy, massage, etc.  Note:   Pain management discussed with patient. Have provided PRN medications per MAR and non-pharmacological interventions as needed. Patient verbalizes understanding of calling for pain needs has notified RN appropriately.

## 2019-08-20 NOTE — ASSESSMENT & PLAN NOTE
-Medical management  -Closely monitor to ensure he is not having another acute event, he is at high risk for ACS

## 2019-08-20 NOTE — PROGRESS NOTES
Patient up to unit from ED. Patient oriented to room and unit, tele monitor placed and verified by monitor room, patient is SR 60's. Complains of 8/10 chest pain while ambulating and 8/10 abdominal pain while laying down. Patient stated he has recently started urinating a lot more frequently. Updated patient on plan of care, all questions and concerns answered, will continue to monitor.

## 2019-08-21 ENCOUNTER — APPOINTMENT (OUTPATIENT)
Dept: RADIOLOGY | Facility: MEDICAL CENTER | Age: 69
End: 2019-08-21
Attending: STUDENT IN AN ORGANIZED HEALTH CARE EDUCATION/TRAINING PROGRAM
Payer: MEDICARE

## 2019-08-21 ENCOUNTER — APPOINTMENT (OUTPATIENT)
Dept: RADIOLOGY | Facility: MEDICAL CENTER | Age: 69
End: 2019-08-21
Attending: INTERNAL MEDICINE
Payer: MEDICARE

## 2019-08-21 ENCOUNTER — APPOINTMENT (OUTPATIENT)
Dept: CARDIOLOGY | Facility: MEDICAL CENTER | Age: 69
End: 2019-08-21
Attending: INTERNAL MEDICINE
Payer: MEDICARE

## 2019-08-21 LAB
LV EJECT FRACT  99904: 40
LV EJECT FRACT MOD 2C 99903: 42.33
LV EJECT FRACT MOD 4C 99902: 57.26
LV EJECT FRACT MOD BP 99901: 49.43
MAGNESIUM SERPL-MCNC: 1.9 MG/DL (ref 1.5–2.5)
PHOSPHATE SERPL-MCNC: 2.6 MG/DL (ref 2.5–4.5)
TROPONIN T SERPL-MCNC: 36 NG/L (ref 6–19)
TROPONIN T SERPL-MCNC: 45 NG/L (ref 6–19)
TROPONIN T SERPL-MCNC: 48 NG/L (ref 6–19)

## 2019-08-21 PROCEDURE — 96365 THER/PROPH/DIAG IV INF INIT: CPT | Mod: XU

## 2019-08-21 PROCEDURE — 700111 HCHG RX REV CODE 636 W/ 250 OVERRIDE (IP): Performed by: INTERNAL MEDICINE

## 2019-08-21 PROCEDURE — 84484 ASSAY OF TROPONIN QUANT: CPT

## 2019-08-21 PROCEDURE — 700111 HCHG RX REV CODE 636 W/ 250 OVERRIDE (IP)

## 2019-08-21 PROCEDURE — 93306 TTE W/DOPPLER COMPLETE: CPT

## 2019-08-21 PROCEDURE — 93005 ELECTROCARDIOGRAM TRACING: CPT | Performed by: INTERNAL MEDICINE

## 2019-08-21 PROCEDURE — A9502 TC99M TETROFOSMIN: HCPCS

## 2019-08-21 PROCEDURE — G0378 HOSPITAL OBSERVATION PER HR: HCPCS

## 2019-08-21 PROCEDURE — A9270 NON-COVERED ITEM OR SERVICE: HCPCS | Performed by: INTERNAL MEDICINE

## 2019-08-21 PROCEDURE — 74018 RADEX ABDOMEN 1 VIEW: CPT

## 2019-08-21 PROCEDURE — 700102 HCHG RX REV CODE 250 W/ 637 OVERRIDE(OP): Performed by: INTERNAL MEDICINE

## 2019-08-21 PROCEDURE — 80048 BASIC METABOLIC PNL TOTAL CA: CPT

## 2019-08-21 PROCEDURE — 96375 TX/PRO/DX INJ NEW DRUG ADDON: CPT | Mod: XU

## 2019-08-21 PROCEDURE — A9270 NON-COVERED ITEM OR SERVICE: HCPCS | Performed by: FAMILY MEDICINE

## 2019-08-21 PROCEDURE — 700102 HCHG RX REV CODE 250 W/ 637 OVERRIDE(OP): Performed by: FAMILY MEDICINE

## 2019-08-21 PROCEDURE — 93306 TTE W/DOPPLER COMPLETE: CPT | Mod: 26 | Performed by: INTERNAL MEDICINE

## 2019-08-21 PROCEDURE — 36415 COLL VENOUS BLD VENIPUNCTURE: CPT

## 2019-08-21 PROCEDURE — 99225 PR SUBSEQUENT OBSERVATION CARE,LEVEL II: CPT | Performed by: INTERNAL MEDICINE

## 2019-08-21 PROCEDURE — 99214 OFFICE O/P EST MOD 30 MIN: CPT | Performed by: INTERNAL MEDICINE

## 2019-08-21 PROCEDURE — 96376 TX/PRO/DX INJ SAME DRUG ADON: CPT | Mod: XU

## 2019-08-21 PROCEDURE — 700105 HCHG RX REV CODE 258

## 2019-08-21 RX ORDER — SODIUM CHLORIDE 9 MG/ML
INJECTION, SOLUTION INTRAVENOUS
Status: COMPLETED
Start: 2019-08-21 | End: 2019-08-21

## 2019-08-21 RX ORDER — POTASSIUM CHLORIDE 7.45 MG/ML
10 INJECTION INTRAVENOUS
Status: COMPLETED | OUTPATIENT
Start: 2019-08-21 | End: 2019-08-21

## 2019-08-21 RX ORDER — REGADENOSON 0.08 MG/ML
INJECTION, SOLUTION INTRAVENOUS
Status: COMPLETED
Start: 2019-08-21 | End: 2019-08-21

## 2019-08-21 RX ORDER — ALUMINA, MAGNESIA, AND SIMETHICONE 2400; 2400; 240 MG/30ML; MG/30ML; MG/30ML
10 SUSPENSION ORAL 4 TIMES DAILY PRN
Status: DISCONTINUED | OUTPATIENT
Start: 2019-08-21 | End: 2019-08-23 | Stop reason: HOSPADM

## 2019-08-21 RX ORDER — POTASSIUM CHLORIDE 20 MEQ/1
20 TABLET, EXTENDED RELEASE ORAL DAILY
Status: DISCONTINUED | OUTPATIENT
Start: 2019-08-21 | End: 2019-08-23 | Stop reason: HOSPADM

## 2019-08-21 RX ORDER — OMEPRAZOLE 20 MG/1
20 CAPSULE, DELAYED RELEASE ORAL 2 TIMES DAILY
Status: DISCONTINUED | OUTPATIENT
Start: 2019-08-21 | End: 2019-08-23 | Stop reason: HOSPADM

## 2019-08-21 RX ADMIN — POTASSIUM CHLORIDE 20 MEQ: 20 TABLET, EXTENDED RELEASE ORAL at 11:21

## 2019-08-21 RX ADMIN — ATORVASTATIN CALCIUM 10 MG: 10 TABLET, FILM COATED ORAL at 21:25

## 2019-08-21 RX ADMIN — OXYCODONE HYDROCHLORIDE 5 MG: 5 TABLET ORAL at 21:33

## 2019-08-21 RX ADMIN — ENALAPRILAT 1.25 MG: 1.25 INJECTION INTRAVENOUS at 12:52

## 2019-08-21 RX ADMIN — SODIUM CHLORIDE 250 ML: 9 INJECTION, SOLUTION INTRAVENOUS at 06:40

## 2019-08-21 RX ADMIN — ENALAPRILAT 1.25 MG: 1.25 INJECTION INTRAVENOUS at 05:49

## 2019-08-21 RX ADMIN — ALUMINUM HYDROXIDE, MAGNESIUM HYDROXIDE,SIMETHICONE 10 ML: 400; 400; 40 LIQUID ORAL at 21:25

## 2019-08-21 RX ADMIN — OMEPRAZOLE 20 MG: 20 CAPSULE, DELAYED RELEASE ORAL at 17:11

## 2019-08-21 RX ADMIN — REGADENOSON 0.4 MG: 0.08 INJECTION, SOLUTION INTRAVENOUS at 10:09

## 2019-08-21 RX ADMIN — POTASSIUM CHLORIDE 10 MEQ: 7.46 INJECTION, SOLUTION INTRAVENOUS at 06:40

## 2019-08-21 RX ADMIN — POLYETHYLENE GLYCOL 3350 1 PACKET: 17 POWDER, FOR SOLUTION ORAL at 21:42

## 2019-08-21 RX ADMIN — IBUPROFEN 800 MG: 800 TABLET, FILM COATED ORAL at 17:11

## 2019-08-21 RX ADMIN — SENNOSIDES, DOCUSATE SODIUM 2 TABLET: 50; 8.6 TABLET, FILM COATED ORAL at 17:11

## 2019-08-21 RX ADMIN — MORPHINE SULFATE 4 MG: 4 INJECTION INTRAVENOUS at 05:48

## 2019-08-21 RX ADMIN — SODIUM CHLORIDE: 9 INJECTION, SOLUTION INTRAVENOUS at 07:30

## 2019-08-21 RX ADMIN — OMEPRAZOLE 20 MG: 20 CAPSULE, DELAYED RELEASE ORAL at 11:21

## 2019-08-21 RX ADMIN — ENALAPRILAT 1.25 MG: 1.25 INJECTION INTRAVENOUS at 21:33

## 2019-08-21 RX ADMIN — IBUPROFEN 800 MG: 800 TABLET, FILM COATED ORAL at 11:21

## 2019-08-21 ASSESSMENT — ENCOUNTER SYMPTOMS
CHILLS: 0
FLANK PAIN: 0
ABDOMINAL PAIN: 1
HEARTBURN: 0
MEMORY LOSS: 0
DIZZINESS: 0
FEVER: 0
COUGH: 0
NAUSEA: 0
DIAPHORESIS: 0
NERVOUS/ANXIOUS: 0
BACK PAIN: 0
WEAKNESS: 0
PALPITATIONS: 0
VOMITING: 0
MYALGIAS: 1
SHORTNESS OF BREATH: 0
HEADACHES: 0
FOCAL WEAKNESS: 0

## 2019-08-21 NOTE — CARE PLAN
Problem: Infection  Goal: Will remain free from infection  Note:   Continue to monitor vs and daily labs.     Problem: Knowledge Deficit  Goal: Knowledge of disease process/condition, treatment plan, diagnostic tests, and medications will improve  Note:   RN will assess knowledge of disease process and provide education as appropriate.

## 2019-08-21 NOTE — PROGRESS NOTES
[facility] Medicine Daily Progress Note    Date of Service  8/21/2019    Chief Complaint  69 y.o. male admitted 8/19/2019 with history of CAD, abdominal aortic aneurysm now presents with complaints of chest pain, dyspnea on exertion with elevated troponin and abnormal EKG.    Hospital Course    69 y.o. male admitted 8/19/2019 with history of CAD, abdominal aortic aneurysm now presents with complaints of chest pain, dyspnea on exertion with elevated troponin and abnormal EKG.      Interval Problem Update  Stress test with no reversible ischemia  Ongoing left upper quadrant abdominal pain, associated with bloating  Trial of PPI        Consultants/Specialty  Cardiology     Code Status  full    Disposition  To home in 1 to 2 days with clinical improvement      Review of Systems  Review of Systems   Constitutional: Negative for chills, diaphoresis, fever and malaise/fatigue.   HENT: Negative for congestion.    Respiratory: Negative for cough and shortness of breath.    Cardiovascular: Positive for chest pain. Negative for palpitations and leg swelling.   Gastrointestinal: Positive for abdominal pain. Negative for heartburn, nausea and vomiting.   Genitourinary: Negative for dysuria and flank pain.   Musculoskeletal: Positive for myalgias. Negative for back pain and joint pain.   Neurological: Negative for dizziness, focal weakness, weakness and headaches.   Psychiatric/Behavioral: Negative for memory loss. The patient is not nervous/anxious.         Physical Exam  Temp:  [36.2 °C (97.1 °F)-37 °C (98.6 °F)] 36.3 °C (97.3 °F)  Pulse:  [55-77] 58  Resp:  [15-18] 18  BP: (126-164)/(69-85) 164/83  SpO2:  [91 %-96 %] 91 %    Physical Exam   Constitutional: He is oriented to person, place, and time. No distress.   HENT:   Head: Normocephalic and atraumatic.   Nose: Nose normal.   Mouth/Throat: No oropharyngeal exudate.   Eyes: Pupils are equal, round, and reactive to light. EOM are normal.   Neck: Neck supple. No thyromegaly  present.   Cardiovascular: Normal rate and intact distal pulses.   Pulmonary/Chest: Breath sounds normal. No respiratory distress. He has no wheezes. He exhibits tenderness.   Abdominal: Soft. Bowel sounds are normal. He exhibits no distension and no mass.   Musculoskeletal: He exhibits no edema or tenderness.   Neurological: He is alert and oriented to person, place, and time. No cranial nerve deficit. Coordination normal.   Skin: Skin is warm and dry. No rash noted. No erythema. No pallor.   Psychiatric: He has a normal mood and affect. His behavior is normal. Judgment and thought content normal.   Nursing note and vitals reviewed.      Fluids  No intake or output data in the 24 hours ending 08/21/19 1528    Laboratory  Recent Labs     08/19/19 2004 08/20/19  2312   WBC 8.6 8.1   RBC 5.90 5.83   HEMOGLOBIN 16.7 16.2   HEMATOCRIT 51.2 50.3   MCV 86.8 86.3   MCH 28.3 27.8   MCHC 32.6* 32.2*   RDW 43.7 42.9   PLATELETCT 156* 161*   MPV 12.2 12.6     Recent Labs     08/19/19 2004 08/20/19  0509 08/20/19  2312   SODIUM 140 140 139   POTASSIUM 3.4* 4.0 3.5*   CHLORIDE 103 105 107   CO2 28 29 22   GLUCOSE 112* 115* 147*   BUN 19 16 17   CREATININE 1.31 1.14 1.21   CALCIUM 9.2 9.1 8.6             Recent Labs     08/20/19  2312   TRIGLYCERIDE 298*   HDL 26*   LDL 68       Imaging      Assessment/Plan  * Chest pain- (present on admission)  Assessment & Plan  8/19 Patient stated started with walking  -This pain is reproducible on exam  -I do feel it is likely costochondritis, starting scheduled ibuprofen  -He did have a stress test within 2 years, showed a fixed defect and severe global hypokinesis, no echocardiogram after this  -Troponin is mildly elevated, continue to trend  -I did personally review his EKG, noted T wave inversions and Q waves, I then compared to most recent EKG in 2017 and again noted T wave inversions and Q waves however these were not slightly different leads    8/20   -Repeat EKG- with LBB, sinus  bradycardia  Labile troponin, at 53 now  We will consult cardiology  Consider stress testing for risk stratification  -Follow-up echocardiogram    8/21 large inferior lateral defect on stress test  No reversible ischemia with ejection fraction of 35%  Continue medication optimization  Troponin trending down    Mixed hyperlipidemia- (present on admission)  Assessment & Plan  -Continue home statin    Hypertension- (present on admission)  Assessment & Plan  -Continue home nifedipine, metoprolol and enalapril  -Place PRN enalapril  -Adjust as needed    CAD (coronary artery disease)- (present on admission)  Assessment & Plan  -Medical management  -Closely monitor to ensure he is not having another acute event, he is at high risk for ACS    Thrombocytopenia (HCC)  Assessment & Plan  -Chronic, no sign of bleeding    Polycythemia  Assessment & Plan  -Looking back at his labs, this is somewhat chronic  -At this point in time he is not hypoxic nor does he appear dehydrated    Chronic renal disease  Assessment & Plan  -At his baseline  -Repeat BMP in the morning    Hypokalemia- (present on admission)  Assessment & Plan  -Start oral potassium  -Repeat BMP in the morning    Benign prostatic hyperplasia without lower urinary tract symptoms- (present on admission)  Assessment & Plan  -Continue Flomax       VTE prophylaxis: lovenox

## 2019-08-21 NOTE — PROGRESS NOTES
Cardiology Attending Attestation    Patient seen and examined and I agree with note by Dr. Pillai below, including history and physical. Continues to have LUQ abdominal pain radiating up to the chest. Feeling okay when I saw him this afternoon. Troponin have been indeterminate but does not suggest acute coronary syndrome. Underwent successful perfusion study today with no ischemia and history of prior infarct. I do not think his symptoms are cardiac. would consider work up for abdominal etiology. Cardiology to sign off.    Sakina Henderson MD    Reason for Consult:  Asked by JORDON [email or web address] to see this patient with Dr. Henderson  Patient's PCP: Dennis Guajardo M.D.    CC:   Chief Complaint   Patient presents with   • Abdominal Pain   • Constipation        Background:  Mr. De La Cruz is a 70 yo Korean-speaking male with PMH of CABG x3 (2009) and AAA s/p repair (2017) who presents with chest pain. He says that the pain started yesterday while he was walking. He says that the pain also occurs after a meal: he develops stomach bloating that travels up to his left chest and he feels his left carotid artery pulsating. No chest palpitations, syncope, orthopnea, PND. His troponins have been indeterminate range (52 -> 31 -> 53 -> 48). EKG showed sinus bradycardia, prolonged HI, LVH. Prior nuclear stress test from 2017 showed fixed inferolateral defect and global hypokinesis.     Interval Problem:  - Had instance of sharp chest pain overnight, correlated with VTach for 6 seconds  - Still having abdominal pain  - No chest palpitations, syncope, orthopnea, PND    Past Medical History:   Diagnosis Date   • AAA (abdominal aortic aneurysm) (HCC) 04/2017    Status post repair by Dr. Kerr.   • Abdominal pain    • Aneurysm (HCC)    • Back pain    • BPH (benign prostatic hyperplasia)    • CAD (coronary artery disease) 2009    Status post CABG x 3, in [place]   • Eye pain    • Fatigue    • Hyperlipidemia    • Hypertension    • Lumps on the  skin    • MI (myocardial infarction) (HCC) 2013   • Snoring    • Vision loss    • Weakness        Past Surgical History:   Procedure Laterality Date   • ABDOMINAL AORTIC ANEURYSM N/A 4/11/2017    Procedure: ABDOMINAL AORTIC ANEURYSM;  Surgeon: Charissa Kerr M.D.;  Location: SURGERY [facility];  Service:    • MULTIPLE CORONARY ARTERY BYPASS  2009    CABG x 3, in [place]       Family History   Problem Relation Age of Onset   • Diabetes Mother    • Lung Cancer Father    • Hypertension Sister    • Diabetes Brother      Patient family history was personally reviewed, no pertinent family history to current presentation    Social History     Socioeconomic History   • Marital status:      Spouse name: Not on file   • Number of children: Not on file   • Years of education: Not on file   • Highest education level: Not on file   Occupational History   • Not on file   Social Needs   • Financial resource strain: Not on file   • Food insecurity:     Worry: Not on file     Inability: Not on file   • Transportation needs:     Medical: Not on file     Non-medical: Not on file   Tobacco Use   • Smoking status: Former Smoker     Packs/day: 1.00     Years: 25.00     Pack years: 25.00     Types: Cigarettes     Last attempt to quit: 3/2/2009     Years since quitting: 10.4   • Smokeless tobacco: Never Used   Substance and Sexual Activity   • Alcohol use: No     Comment: use to drink 2 beers a day quit 03/2009   • Drug use: No   • Sexual activity: Not Currently   Lifestyle   • Physical activity:     Days per week: Not on file     Minutes per session: Not on file   • Stress: Not on file   Relationships   • Social connections:     Talks on phone: Not on file     Gets together: Not on file     Attends Anabaptism service: Not on file     Active member of club or organization: Not on file     Attends meetings of clubs or organizations: Not on file     Relationship status: Not on file   • Intimate partner violence:     Fear of  current or ex partner: Not on file     Emotionally abused: Not on file     Physically abused: Not on file     Forced sexual activity: Not on file   Other Topics Concern   • Not on file   Social History Narrative   • Not on file       ALLERGIES:  No Known Allergies    Review of systems:  Constitutional: Negative for chills and fever.   HENT: Negative for ear pain and sore throat.    Eyes: Negative for discharge and redness.   Respiratory: Negative for cough, hemoptysis, wheezing and stridor.    Cardiovascular: +chest pain, no palpitations and leg swelling.   Gastrointestinal: +abdominal pain, heartburn, nausea no constipation, diarrhea, vomiting.   Genitourinary: Negative for dysuria, flank pain and hematuria.   Musculoskeletal: Negative for falls and myalgias.   Skin: Negative for itching and rash.   Neurological: Negative for dizziness, seizures, loss of consciousness and headaches.   Endo/Heme/Allergies: Negative for polydipsia. Does not bruise/bleed easily.   Psychiatric/Behavioral: Negative for substance abuse and suicidal ideas.     Physical exam:  Vitals:    08/20/19 1900 08/20/19 2300 08/21/19 0300 08/21/19 0735   BP: 150/85 126/69 148/82 (!) 162/78   Pulse: 77 61 (!) 55 (!) 57   Resp: 18 18 17 18   Temp: 36.2 °C (97.2 °F) 37 °C (98.6 °F) 36.7 °C (98 °F) 36.2 °C (97.1 °F)   TempSrc: Temporal Temporal Temporal Temporal   SpO2: 94% 96% 95% 91%   Weight:       Height:         Physical Exam  Gen: non-toxic appearing, resting comfortably, in NAD  HEENT: NC/AT, no scleral icterus, no conjunctival injection, mucous membranes pink and dry.  Neck: Supple, no lymphadenopathy.  Cardiac: S1S2, RRR, no m/r/g, no JVD.  Respiratory: Normal effort, symmetrical, CTA b/l.  Abdomen: BS+, soft, NT/ND, no rebound/guarding, no palpable organomegaly.  Ext: No edema, 2+ DP pulses b/l.  Skin: Warm, dry. No rashes or erythema.   Neuro: AAOx4, CN II-XII grossly normal, no focal sensory or motor deficits.   Psych: Affect, mood,  judgement normal.    Data:  Laboratory studies personally reviewed by me:  Recent Results (from the past 24 hour(s))   TROPONIN    Collection Time: 08/20/19 11:46 AM   Result Value Ref Range    Troponin T 51 (H) 6 - 19 ng/L   TROPONIN    Collection Time: 08/20/19  5:32 PM   Result Value Ref Range    Troponin T 49 (H) 6 - 19 ng/L   TROPONIN    Collection Time: 08/20/19 11:12 PM   Result Value Ref Range    Troponin T 44 (H) 6 - 19 ng/L   CBC WITH DIFFERENTIAL    Collection Time: 08/20/19 11:12 PM   Result Value Ref Range    WBC 8.1 4.8 - 10.8 K/uL    RBC 5.83 4.70 - 6.10 M/uL    Hemoglobin 16.2 14.0 - 18.0 g/dL    Hematocrit 50.3 42.0 - 52.0 %    MCV 86.3 81.4 - 97.8 fL    MCH 27.8 27.0 - 33.0 pg    MCHC 32.2 (L) 33.7 - 35.3 g/dL    RDW 42.9 35.9 - 50.0 fL    Platelet Count 161 (L) 164 - 446 K/uL    MPV 12.6 9.0 - 12.9 fL    Neutrophils-Polys 57.70 44.00 - 72.00 %    Lymphocytes 28.90 22.00 - 41.00 %    Monocytes 8.60 0.00 - 13.40 %    Eosinophils 3.90 0.00 - 6.90 %    Basophils 0.70 0.00 - 1.80 %    Immature Granulocytes 0.20 0.00 - 0.90 %    Nucleated RBC 0.00 /100 WBC    Neutrophils (Absolute) 4.69 1.82 - 7.42 K/uL    Lymphs (Absolute) 2.35 1.00 - 4.80 K/uL    Monos (Absolute) 0.70 0.00 - 0.85 K/uL    Eos (Absolute) 0.32 0.00 - 0.51 K/uL    Baso (Absolute) 0.06 0.00 - 0.12 K/uL    Immature Granulocytes (abs) 0.02 0.00 - 0.11 K/uL    NRBC (Absolute) 0.00 K/uL   Basic Metabolic Panel    Collection Time: 08/20/19 11:12 PM   Result Value Ref Range    Sodium 139 135 - 145 mmol/L    Potassium 3.5 (L) 3.6 - 5.5 mmol/L    Chloride 107 96 - 112 mmol/L    Co2 22 20 - 33 mmol/L    Glucose 147 (H) 65 - 99 mg/dL    Bun 17 8 - 22 mg/dL    Creatinine 1.21 0.50 - 1.40 mg/dL    Calcium 8.6 8.5 - 10.5 mg/dL    Anion Gap 10.0 0.0 - 11.9   Lipid Profile    Collection Time: 08/20/19 11:12 PM   Result Value Ref Range    Cholesterol,Tot 154 100 - 199 mg/dL    Triglycerides 298 (H) 0 - 149 mg/dL    HDL 26 (A) >=40 mg/dL    LDL 68 <100  mg/dL   ESTIMATED GFR    Collection Time: 19 11:12 PM   Result Value Ref Range    GFR If African American >60 >60 mL/min/1.73 m 2    GFR If Non African American 60 >60 mL/min/1.73 m 2   MAGNESIUM    Collection Time: 19 11:12 PM   Result Value Ref Range    Magnesium 1.9 1.5 - 2.5 mg/dL   PHOSPHORUS    Collection Time: 19 11:12 PM   Result Value Ref Range    Phosphorus 2.6 2.5 - 4.5 mg/dL   TROPONIN    Collection Time: 19  5:41 AM   Result Value Ref Range    Troponin T 48 (H) 6 - 19 ng/L   EKG    Collection Time: 19  6:25 AM   Result Value Ref Range    Report       [facility]    Test Date:  2019  Pt Name:    SAMUEL VILLASENOR        Department: 171  MRN:        [id number]                      Room:       [facility]  Gender:     Male                         Technician: [facility]  :        1950                   Requested By:MARISOL ZAPATA  Order #:    [id number]                    Reading MD:    Measurements  Intervals                                Axis  Rate:       56                           P:          35  IN:         252                          QRS:        -43  QRSD:       130                          T:          163  QT:         448  QTc:        433    Interpretive Statements  SINUS BRADYCARDIA  FIRST DEGREE AV BLOCK  IVCD, CONSIDER ATYPICAL LBBB  Compared to ECG 2019 02:42:11  Left ventricular hypertrophy no longer present  Early repolarization no longer present  Myocardial infarct finding no longer present  ST (T wave) deviation no longer present         Imaging:  DX-CHEST-PORTABLE (1 VIEW)   Final Result      No acute cardiopulmonary findings.      EC-ECHOCARDIOGRAM COMPLETE W/O CONT    (Results Pending)   NM-CARDIAC STRESS TEST    (Results Pending)       EKG : personally reviewed by me unchanged from prior    All pertinent features of laboratory and imaging reviewed including primary images where applicable      Principal Problem:    Chest pain POA: Yes  Active  Problems:    CAD (coronary artery disease) POA: Yes      Overview: 2009: CABG x 3 in [place].    Hypertension POA: Yes    Mixed hyperlipidemia POA: Yes    Hypokalemia POA: Yes    Chronic renal disease POA: Unknown    Polycythemia POA: Unknown    Thrombocytopenia (HCC) POA: Unknown    Benign prostatic hyperplasia without lower urinary tract symptoms POA: Yes  Resolved Problems:    * No resolved hospital problems. *      Assessment / Plan:  #Stable CAD  Patient with non-anginal chest pain. Reproducible so likely costochondritis, though also occurs with meals and accompanying bloating so could be GI issue. Troponin mild elevation likely troponin leak from his LVH. EKG non concerning. Many of his symptoms, such as the neck pulsations, may be attributed to his high blood pressure.  - Given he has known CAD and his last stress test was more than 2 years ago, will evaluate with nuclear stress test     #HTN  His systolics have been 145-180  - Increase enalapril to 20mg  - If needed can further increase to 40mg, or go up on nifedipine      No future appointments.    It is my pleasure to participate in the care of Mr. Jono Antony. Please do not hesitate to contact me with questions or concerns.    8/21/2019    Marta Pillai MD  Attending: Sakina Henderson MD

## 2019-08-21 NOTE — PROGRESS NOTES
Pt reported 9/10 abdominal pain, radiating to L lung and L chest. When assessed, pt /101. Medicated pain and BP per MAR. Dr. Juárez updated on pt condition. Orders received. Pt then had 9 beats Vtach, Dr. Juárez informed. New orders received.

## 2019-08-21 NOTE — PROGRESS NOTES
Pt A&Ox4. [place] speaking only.  Independent with ambulation.  Still c/o abdominal pain.  Abd xray ordered.  PPI started  Stress test and echo completed today.  Vasotec prn sbp>165 or dbp>105.

## 2019-08-21 NOTE — CARE PLAN
Problem: Infection  Goal: Will remain free from infection  Outcome: PROGRESSING AS EXPECTED  Note:   Pt educated on importance of hand hygiene and oral care. Standard precautions in place.        Problem: Knowledge Deficit  Goal: Knowledge of disease process/condition, treatment plan, diagnostic tests, and medications will improve  Outcome: PROGRESSING AS EXPECTED  Note:   Pt educated about disease process. Reason why medications are taken. And informed about treatment plan.

## 2019-08-22 PROBLEM — K59.00 CONSTIPATION: Status: ACTIVE | Noted: 2019-08-22

## 2019-08-22 PROBLEM — K21.9 GERD (GASTROESOPHAGEAL REFLUX DISEASE): Status: ACTIVE | Noted: 2019-08-22

## 2019-08-22 LAB
ANION GAP SERPL CALC-SCNC: 9 MMOL/L (ref 0–11.9)
BUN SERPL-MCNC: 21 MG/DL (ref 8–22)
CALCIUM SERPL-MCNC: 8.8 MG/DL (ref 8.5–10.5)
CHLORIDE SERPL-SCNC: 104 MMOL/L (ref 96–112)
CO2 SERPL-SCNC: 22 MMOL/L (ref 20–33)
CREAT SERPL-MCNC: 1.26 MG/DL (ref 0.5–1.4)
EKG IMPRESSION: NORMAL
EKG IMPRESSION: NORMAL
GLUCOSE SERPL-MCNC: 111 MG/DL (ref 65–99)
POTASSIUM SERPL-SCNC: 3.8 MMOL/L (ref 3.6–5.5)
SODIUM SERPL-SCNC: 135 MMOL/L (ref 135–145)
TROPONIN T SERPL-MCNC: 42 NG/L (ref 6–19)
TROPONIN T SERPL-MCNC: 43 NG/L (ref 6–19)
TROPONIN T SERPL-MCNC: 44 NG/L (ref 6–19)
TROPONIN T SERPL-MCNC: 44 NG/L (ref 6–19)
TROPONIN T SERPL-MCNC: 48 NG/L (ref 6–19)

## 2019-08-22 PROCEDURE — 700102 HCHG RX REV CODE 250 W/ 637 OVERRIDE(OP): Performed by: INTERNAL MEDICINE

## 2019-08-22 PROCEDURE — 700111 HCHG RX REV CODE 636 W/ 250 OVERRIDE (IP): Performed by: INTERNAL MEDICINE

## 2019-08-22 PROCEDURE — 93005 ELECTROCARDIOGRAM TRACING: CPT | Performed by: INTERNAL MEDICINE

## 2019-08-22 PROCEDURE — 96376 TX/PRO/DX INJ SAME DRUG ADON: CPT

## 2019-08-22 PROCEDURE — 93010 ELECTROCARDIOGRAM REPORT: CPT | Mod: 76 | Performed by: INTERNAL MEDICINE

## 2019-08-22 PROCEDURE — 99285 EMERGENCY DEPT VISIT HI MDM: CPT

## 2019-08-22 PROCEDURE — G0378 HOSPITAL OBSERVATION PER HR: HCPCS

## 2019-08-22 PROCEDURE — A9270 NON-COVERED ITEM OR SERVICE: HCPCS | Performed by: STUDENT IN AN ORGANIZED HEALTH CARE EDUCATION/TRAINING PROGRAM

## 2019-08-22 PROCEDURE — A9270 NON-COVERED ITEM OR SERVICE: HCPCS | Performed by: INTERNAL MEDICINE

## 2019-08-22 PROCEDURE — 96372 THER/PROPH/DIAG INJ SC/IM: CPT

## 2019-08-22 PROCEDURE — 700102 HCHG RX REV CODE 250 W/ 637 OVERRIDE(OP): Performed by: STUDENT IN AN ORGANIZED HEALTH CARE EDUCATION/TRAINING PROGRAM

## 2019-08-22 PROCEDURE — 93010 ELECTROCARDIOGRAM REPORT: CPT | Performed by: INTERNAL MEDICINE

## 2019-08-22 PROCEDURE — 84484 ASSAY OF TROPONIN QUANT: CPT | Mod: 91

## 2019-08-22 PROCEDURE — 99225 PR SUBSEQUENT OBSERVATION CARE,LEVEL II: CPT | Performed by: INTERNAL MEDICINE

## 2019-08-22 PROCEDURE — 36415 COLL VENOUS BLD VENIPUNCTURE: CPT

## 2019-08-22 RX ORDER — OMEPRAZOLE 20 MG/1
20 CAPSULE, DELAYED RELEASE ORAL 2 TIMES DAILY
Qty: 30 CAP | Refills: 1 | Status: SHIPPED | OUTPATIENT
Start: 2019-08-22 | End: 2019-08-23

## 2019-08-22 RX ADMIN — ENOXAPARIN SODIUM 40 MG: 100 INJECTION SUBCUTANEOUS at 05:45

## 2019-08-22 RX ADMIN — METOPROLOL TARTRATE 50 MG: 50 TABLET ORAL at 16:59

## 2019-08-22 RX ADMIN — IBUPROFEN 800 MG: 800 TABLET, FILM COATED ORAL at 12:41

## 2019-08-22 RX ADMIN — ENALAPRIL MALEATE 20 MG: 10 TABLET ORAL at 05:44

## 2019-08-22 RX ADMIN — OMEPRAZOLE 20 MG: 20 CAPSULE, DELAYED RELEASE ORAL at 05:44

## 2019-08-22 RX ADMIN — ENALAPRILAT 1.25 MG: 1.25 INJECTION INTRAVENOUS at 06:41

## 2019-08-22 RX ADMIN — NIFEDIPINE 30 MG: 30 TABLET, FILM COATED, EXTENDED RELEASE ORAL at 05:43

## 2019-08-22 RX ADMIN — ATORVASTATIN CALCIUM 10 MG: 10 TABLET, FILM COATED ORAL at 22:41

## 2019-08-22 RX ADMIN — SENNOSIDES, DOCUSATE SODIUM 2 TABLET: 50; 8.6 TABLET, FILM COATED ORAL at 05:43

## 2019-08-22 RX ADMIN — METOPROLOL TARTRATE 100 MG: 50 TABLET ORAL at 05:43

## 2019-08-22 RX ADMIN — OMEPRAZOLE 20 MG: 20 CAPSULE, DELAYED RELEASE ORAL at 17:00

## 2019-08-22 RX ADMIN — TAMSULOSIN HYDROCHLORIDE 0.4 MG: 0.4 CAPSULE ORAL at 05:43

## 2019-08-22 RX ADMIN — POTASSIUM CHLORIDE 20 MEQ: 20 TABLET, EXTENDED RELEASE ORAL at 05:44

## 2019-08-22 RX ADMIN — ASPIRIN 325 MG: 325 TABLET, FILM COATED ORAL at 05:43

## 2019-08-22 RX ADMIN — SENNOSIDES, DOCUSATE SODIUM 2 TABLET: 50; 8.6 TABLET, FILM COATED ORAL at 16:59

## 2019-08-22 RX ADMIN — IBUPROFEN 800 MG: 800 TABLET, FILM COATED ORAL at 05:43

## 2019-08-22 ASSESSMENT — ENCOUNTER SYMPTOMS
VOMITING: 0
MEMORY LOSS: 0
HEADACHES: 0
FEVER: 0
WEAKNESS: 0
HEARTBURN: 0
ABDOMINAL PAIN: 1
PALPITATIONS: 0
CONSTIPATION: 1
BACK PAIN: 0
MYALGIAS: 1
DIAPHORESIS: 0
NERVOUS/ANXIOUS: 0
SHORTNESS OF BREATH: 0
CHILLS: 0

## 2019-08-22 NOTE — PROGRESS NOTES
Pt  To be dc'd home tomorrow per MD.  Pt has been chest pain and abdominal pain free.  Resting quietly with wife at bedside.

## 2019-08-22 NOTE — PROGRESS NOTES
[facility] Medicine Daily Progress Note    Date of Service  8/22/2019    Chief Complaint  69 y.o. male admitted 8/19/2019 with history of CAD, abdominal aortic aneurysm now presents with complaints of chest pain, dyspnea on exertion with elevated troponin and abnormal EKG.    Hospital Course    69 y.o. male admitted 8/19/2019 with history of CAD, abdominal aortic aneurysm now presents with complaints of chest pain, dyspnea on exertion with elevated troponin and abnormal EKG.      Interval Problem Update  Reports constipation, no bowel movement for 3 to 4 days  Denies any further chest pain  Educated on acid reflux, avoidance of fatty foods or spicy foods  Limited use of caffeine    Consultants/Specialty  Cardiology     Code Status  full    Disposition  To home in 1 to 2 days with clinical improvement  Abdominal x-ray with no obstruction    Review of Systems  Review of Systems   Constitutional: Negative for chills, diaphoresis and fever.   HENT: Negative for congestion.    Respiratory: Negative for shortness of breath.    Cardiovascular: Positive for chest pain. Negative for palpitations and leg swelling.   Gastrointestinal: Positive for abdominal pain and constipation. Negative for heartburn and vomiting.   Genitourinary: Negative for dysuria.   Musculoskeletal: Positive for myalgias. Negative for back pain and joint pain.   Neurological: Negative for weakness and headaches.   Psychiatric/Behavioral: Negative for memory loss. The patient is not nervous/anxious.         Physical Exam  Temp:  [35.9 °C (96.7 °F)-36.7 °C (98.1 °F)] 36.6 °C (97.9 °F)  Pulse:  [46-71] 55  Resp:  [14-18] 18  BP: (123-180)/() 123/60  SpO2:  [94 %-96 %] 96 %    Physical Exam   Constitutional: He is oriented to person, place, and time. No distress.   HENT:   Head: Normocephalic and atraumatic.   Eyes: Pupils are equal, round, and reactive to light. EOM are normal.   Neck: Neck supple. No JVD present.   Cardiovascular: Normal rate and  intact distal pulses.   Pulmonary/Chest: Breath sounds normal. No respiratory distress. He has no wheezes. He exhibits no tenderness.   Abdominal: Soft. Bowel sounds are normal. He exhibits no distension. There is no tenderness.   Musculoskeletal: He exhibits no edema.   Neurological: He is alert and oriented to person, place, and time. No cranial nerve deficit. He exhibits normal muscle tone.   Skin: Skin is warm and dry. No rash noted. He is not diaphoretic. No pallor.   Psychiatric: He has a normal mood and affect. His behavior is normal. Judgment and thought content normal.   Nursing note and vitals reviewed.      Fluids    Intake/Output Summary (Last 24 hours) at 8/22/2019 1222  Last data filed at 8/22/2019 0400  Gross per 24 hour   Intake --   Output 300 ml   Net -300 ml       Laboratory  Recent Labs     08/19/19 2004 08/20/19 2312   WBC 8.6 8.1   RBC 5.90 5.83   HEMOGLOBIN 16.7 16.2   HEMATOCRIT 51.2 50.3   MCV 86.8 86.3   MCH 28.3 27.8   MCHC 32.6* 32.2*   RDW 43.7 42.9   PLATELETCT 156* 161*   MPV 12.2 12.6     Recent Labs     08/20/19  0509 08/20/19  2312 08/21/19  2345   SODIUM 140 139 135   POTASSIUM 4.0 3.5* 3.8   CHLORIDE 105 107 104   CO2 29 22 22   GLUCOSE 115* 147* 111*   BUN 16 17 21   CREATININE 1.14 1.21 1.26   CALCIUM 9.1 8.6 8.8             Recent Labs     08/20/19  2312   TRIGLYCERIDE 298*   HDL 26*   LDL 68       Imaging      Assessment/Plan  * Chest pain- (present on admission)  Assessment & Plan  8/19 Patient stated started with walking  -This pain is reproducible on exam  -I do feel it is likely costochondritis, starting scheduled ibuprofen  -He did have a stress test within 2 years, showed a fixed defect and severe global hypokinesis, no echocardiogram after this  -Troponin is mildly elevated, continue to trend  -I did personally review his EKG, noted T wave inversions and Q waves, I then compared to most recent EKG in 2017 and again noted T wave inversions and Q waves however these  were not slightly different leads    8/20   -Repeat EKG- with LBB, sinus bradycardia  Labile troponin, at 53 now  We will consult cardiology  Consider stress testing for risk stratification  -Follow-up echocardiogram    8/21 large inferior lateral defect on stress test  No reversible ischemia with ejection fraction of 35%  Continue medication optimization  Troponin trending down    8/22 encourage activity  Continue medication management  Resolved    Mixed hyperlipidemia- (present on admission)  Assessment & Plan  -Continue home statin    Hypertension- (present on admission)  Assessment & Plan  -Continue home nifedipine, metoprolol and enalapril  -Place PRN enalapril  -Adjust as needed    CAD (coronary artery disease)- (present on admission)  Assessment & Plan  -Medical management  -Closely monitor to ensure he is not having another acute event, he is at high risk for ACS    GERD (gastroesophageal reflux disease)  Assessment & Plan  On PPI twice daily    Constipation  Assessment & Plan  Bowel protocol  Encourage activity  Suppository this evening if no bowel movement    Thrombocytopenia (HCC)  Assessment & Plan  -Chronic, no sign of bleeding    Polycythemia  Assessment & Plan  -Looking back at his labs, this is somewhat chronic  -At this point in time he is not hypoxic nor does he appear dehydrated    Chronic renal disease  Assessment & Plan  -At his baseline  -Repeat BMP in the morning    Hypokalemia- (present on admission)  Assessment & Plan  -Start oral potassium  -Repeat BMP in the morning    Benign prostatic hyperplasia without lower urinary tract symptoms- (present on admission)  Assessment & Plan  -Continue Flomax       VTE prophylaxis: lovenox

## 2019-08-22 NOTE — PROGRESS NOTES
12 hour chart check    Monitor summary  SB-SR 41-77  F/O PVC's, R coup, R/F bigem  HR down to 38, unsustained  .18/.12/.38

## 2019-08-22 NOTE — PROGRESS NOTES
Call placed to Dr Moreland due to pt's /92 but pulse is 46 on monitor and 48 apical.  Pt is alert, denies pain.  Per MD pt has been bradycardic and asymptomatic throughout stay.  Per MD VO give only 50 mg of Lopressor at this time and follow up with IV Vasotec if BP is not controlled.

## 2019-08-22 NOTE — PROGRESS NOTES
Assumed care from day shift nurse. Alertx4, able to make needs known. Primarily [place] speaking. Up ad keila, steady gait observed. On RA without SOB. On tele monitor. C/o abdominal pain and discomfort, made on call hospitalist aware, prn orders carried out. Good relief, able to sleep comfortably in between care. All needs tended to. Care endorsed to am nurse

## 2019-08-23 ENCOUNTER — PATIENT OUTREACH (OUTPATIENT)
Dept: HEALTH INFORMATION MANAGEMENT | Facility: OTHER | Age: 69
End: 2019-08-23

## 2019-08-23 VITALS
RESPIRATION RATE: 18 BRPM | OXYGEN SATURATION: 94 % | WEIGHT: 174.16 LBS | DIASTOLIC BLOOD PRESSURE: 79 MMHG | BODY MASS INDEX: 23.08 KG/M2 | HEART RATE: 53 BPM | SYSTOLIC BLOOD PRESSURE: 156 MMHG | HEIGHT: 73 IN | TEMPERATURE: 97.5 F

## 2019-08-23 LAB — TROPONIN T SERPL-MCNC: 45 NG/L (ref 6–19)

## 2019-08-23 PROCEDURE — 99217 PR OBSERVATION CARE DISCHARGE: CPT | Performed by: INTERNAL MEDICINE

## 2019-08-23 PROCEDURE — 96372 THER/PROPH/DIAG INJ SC/IM: CPT

## 2019-08-23 PROCEDURE — 700102 HCHG RX REV CODE 250 W/ 637 OVERRIDE(OP): Performed by: INTERNAL MEDICINE

## 2019-08-23 PROCEDURE — 84484 ASSAY OF TROPONIN QUANT: CPT

## 2019-08-23 PROCEDURE — G0378 HOSPITAL OBSERVATION PER HR: HCPCS

## 2019-08-23 PROCEDURE — 700102 HCHG RX REV CODE 250 W/ 637 OVERRIDE(OP): Performed by: STUDENT IN AN ORGANIZED HEALTH CARE EDUCATION/TRAINING PROGRAM

## 2019-08-23 PROCEDURE — A9270 NON-COVERED ITEM OR SERVICE: HCPCS | Performed by: INTERNAL MEDICINE

## 2019-08-23 PROCEDURE — 36415 COLL VENOUS BLD VENIPUNCTURE: CPT

## 2019-08-23 PROCEDURE — A9270 NON-COVERED ITEM OR SERVICE: HCPCS | Performed by: STUDENT IN AN ORGANIZED HEALTH CARE EDUCATION/TRAINING PROGRAM

## 2019-08-23 PROCEDURE — 700111 HCHG RX REV CODE 636 W/ 250 OVERRIDE (IP): Performed by: INTERNAL MEDICINE

## 2019-08-23 RX ORDER — SPIRONOLACTONE 25 MG/1
25 TABLET ORAL DAILY
Qty: 30 TAB | Refills: 3 | Status: SHIPPED | OUTPATIENT
Start: 2019-08-23 | End: 2019-08-23

## 2019-08-23 RX ORDER — METOPROLOL TARTRATE 100 MG/1
50 TABLET ORAL 2 TIMES DAILY
Qty: 60 TAB | Refills: 0 | Status: SHIPPED | OUTPATIENT
Start: 2019-08-23 | End: 2019-08-23 | Stop reason: SDUPTHER

## 2019-08-23 RX ORDER — SPIRONOLACTONE 25 MG/1
25 TABLET ORAL DAILY
Qty: 30 TAB | Refills: 3 | Status: SHIPPED | OUTPATIENT
Start: 2019-08-23

## 2019-08-23 RX ORDER — SPIRONOLACTONE 25 MG/1
25 TABLET ORAL
Status: DISCONTINUED | OUTPATIENT
Start: 2019-08-23 | End: 2019-08-23 | Stop reason: HOSPADM

## 2019-08-23 RX ORDER — OMEPRAZOLE 20 MG/1
20 CAPSULE, DELAYED RELEASE ORAL 2 TIMES DAILY
Qty: 30 CAP | Refills: 1 | Status: SHIPPED | OUTPATIENT
Start: 2019-08-23

## 2019-08-23 RX ORDER — METOPROLOL TARTRATE 100 MG/1
50 TABLET ORAL 2 TIMES DAILY
Qty: 60 TAB | Refills: 0 | Status: SHIPPED | OUTPATIENT
Start: 2019-08-23

## 2019-08-23 RX ADMIN — ENALAPRIL MALEATE 20 MG: 10 TABLET ORAL at 05:24

## 2019-08-23 RX ADMIN — OXYCODONE HYDROCHLORIDE 5 MG: 5 TABLET ORAL at 04:27

## 2019-08-23 RX ADMIN — TAMSULOSIN HYDROCHLORIDE 0.4 MG: 0.4 CAPSULE ORAL at 05:24

## 2019-08-23 RX ADMIN — ASPIRIN 325 MG: 325 TABLET, FILM COATED ORAL at 05:24

## 2019-08-23 RX ADMIN — SPIRONOLACTONE 25 MG: 25 TABLET ORAL at 08:54

## 2019-08-23 RX ADMIN — NIFEDIPINE 30 MG: 30 TABLET, FILM COATED, EXTENDED RELEASE ORAL at 05:23

## 2019-08-23 RX ADMIN — POTASSIUM CHLORIDE 20 MEQ: 20 TABLET, EXTENDED RELEASE ORAL at 05:24

## 2019-08-23 RX ADMIN — SENNOSIDES, DOCUSATE SODIUM 2 TABLET: 50; 8.6 TABLET, FILM COATED ORAL at 05:24

## 2019-08-23 RX ADMIN — OMEPRAZOLE 20 MG: 20 CAPSULE, DELAYED RELEASE ORAL at 05:24

## 2019-08-23 RX ADMIN — ENOXAPARIN SODIUM 40 MG: 100 INJECTION SUBCUTANEOUS at 05:23

## 2019-08-23 RX ADMIN — METOPROLOL TARTRATE 100 MG: 50 TABLET ORAL at 05:24

## 2019-08-23 NOTE — PROGRESS NOTES
Discussed discharge with MD Moreland. Patient reports feeling dizzy and light headed in the bathroom and when getting up. MD ok with discharge and is changing metoprolol order for discharge. Orthostatics completed and positive. Patient educated on need to sit up and stand slowly via . Patient and family verbalize understanding.

## 2019-08-23 NOTE — DISCHARGE INSTRUCTIONS
Discharge Instructions per Samara Moreland D.O.    F/u with pcp/dc clinic in 1 week  Cardiology in 3-4 weeks    DIET: cardiac diet, avoid fatty and spicy food    ACTIVITY: as tolerated    DIAGNOSIS: GERD, CAD    Return to ER if increasing pain or sob.  Discharge Instructions    Discharged to home by car with relative. Discharged via wheelchair, hospital escort: Yes.  Special equipment needed: Not Applicable    Be sure to schedule a follow-up appointment with your primary care doctor or any specialists as instructed.     Discharge Plan:   Influenza Vaccine Indication: Patient Refuses, Not indicated: Previously immunized this influenza season and > 8 years of age    I understand that a diet low in cholesterol, fat, and sodium is recommended for good health. Unless I have been given specific instructions below for another diet, I accept this instruction as my diet prescription.   Other diet: Cardiac    Special Instructions: Diagnosis:  Acute Coronary Syndrome (ACS) is a diagnosis that encompasses cardiac-related chest pain and heart attack. ACS occurs when the blood flow to the heart muscle is severely reduced or cut off completely due to a slow process called atherosclerosis.  Atherosclerosis is a disease in which the coronary arteries become narrow from a buildup of fat, cholesterol, and other substances that combine to form plaque. If the plaque breaks, a blood clot will form and block the blood flow to the heart muscle. This lack of blood flow can cause damage or death to the heart muscle which is called a heart attack or Myocardial Infarction (MI). There are two different types of MIs:  ST Elevation Myocardial Infarction or STEMI (the most severe type of heart attack) and Non-ST Elevation Myocardial Infarction or NSTEMI.    Treatment Plan:  · Cardiac Diet  - Low fat, low salt, low cholesterol   · Cardiac Rehab  - Your doctor has ordered you a referral to [facility].  Call [phone] to schedule an  appointment.  · Attend my follow-up appointment with my Cardiologist.  · Take my medications as prescribed by my doctor  · Exercise daily  · Lower my bad cholesterol and raise my good cholesterol, lower my blood pressure and Reduce stress    Medications:  Certain medications are used to treat ACS.  Remember to always take medications as prescribed and never stop talking medications unless told by your doctor.    You have been prescribed the following medicatons:    Aspirin - Aspirin is used as a blood thinning medication and you will require this medication indefinitely.    Beta-Blocker - Beta-Blocker Metoprolol is used to lower blood pressure and heart rate, and/or helps your heart heal after a heart attack.  Statin - Statin Atorvastatin is used to lower cholesterol.  Angiotensin Converting Enzyme (ACE) Inhibitor - Angiotensin Converting Enzyme Inhibitor Enalapril is used to lower blood pressure and treat heart failure.    · Is patient discharged on Warfarin / Coumadin?   No     Depression / Suicide Risk    As you are discharged from this [facility] facility, it is important to learn how to keep safe from harming yourself.    Recognize the warning signs:  · Abrupt changes in personality, positive or negative- including increase in energy   · Giving away possessions  · Change in eating patterns- significant weight changes-  positive or negative  · Change in sleeping patterns- unable to sleep or sleeping all the time   · Unwillingness or inability to communicate  · Depression  · Unusual sadness, discouragement and loneliness  · Talk of wanting to die  · Neglect of personal appearance   · Rebelliousness- reckless behavior  · Withdrawal from people/activities they love  · Confusion- inability to concentrate     If you or a loved one observes any of these behaviors or has concerns about self-harm, here's what you can do:  · Talk about it- your feelings and reasons for harming yourself  · Remove any means that you  might use to hurt yourself (examples: pills, rope, extension cords, firearm)  · Get professional help from the community (Mental Health, Substance Abuse, psychological counseling)  · Do not be alone:Call your Safe Contact- someone whom you trust who will be there for you.  · Call your local CRISIS HOTLINE 014-7187 or 276-790-2375  · Call your local Children's Mobile Crisis Response Team [place] (125) 610-9335 or WISHI  · Call the toll free National Suicide Prevention Hotlines   · National Suicide Prevention Lifeline 988-101-WYRD (6130)  · [employer] [facility] 800-SUICIDE (557-8133)        Dolor en el pecho (observación)  Generalmente no es fácil irene un diagnóstico del origen del dolor en el pecho. Probablemente la causa de bartolo síntomas sea el suministro insuficiente de oxígeno al corazón (angina de pecho). La angina de pecho que no se trata o evalúa puede conducir a un ataque cardíaco (infarto de miocardio) o a la muerte.  Para determinar la causa posible del dolor en el pecho, le indicarán análisis de mario, electrocardiogramas y radiografías. Luego de la evaluación y la observación, el médico terrell determinado que no es probable que el dolor que usted siente sea angina de pecho. Raul deberá someterse a kasie evaluación más profunda. Deberá concurrir a controles con otros profesionales o pruebas diagnósticas, según las indicaciones. Es muy importante que cumpla con las visitas de control. Si no cumple con los controles, podría sufrir un daño cardíaco permanente, discapacidad o hasta la muerte. Si hay algún problema para cumplir con las visitas de control, comuníquelo a malik médico.  CUIDADOS EN EL HOGAR   Debido a la ligera probabilidad de que el dolor que siente pueda ser angina de pecho, es importante que siga un estilo de omar saludable y cumpla con el plan de tratamiento indicado por el médico.  · Mantenga un peso saludable.  · Manténgase físicamente activo y avi ejercicios  regularmente.  · Disminuya la cantidad de sal que consume.  · Siga kasie dieta pobre grasas saturadas y colesterol. Evite comidas fritas en aceite o preparadas con grasa. Póngase en contacto con un nutricionista para aprender cuales son los alimentos saludables.  · Aumente el consumo de fibras, e incluya en la dieta granos enteros, vegetales y frutas.  · Evite situaciones que causen estrés, cecily o depresión.  · [place] los medicamentos sammie le indicó el médico. Informe al profesional si tiene algún efecto adverso. No suspenda la medicación ni ajuste las dosis por malik cuenta.  · Deje de fumar. No use parches ni goma de mascar con nicotina hasta consultar al médico.  · Mantenga la presión arterial, los niveles de glucosa y colesterol dentro de límites normales.  · Limite el consumo de alcohol a no más de 1 medida por día si es lillian y no está embarazada y 2 medidas si es hombre.  · Evite el consumo de drogas.  SOLICITE ATENCIÓN MÉDICA SI:  Siente dolor intenso u opresión en el pecho además de otros síntomas sammie:  · Dolor o presión en los brazos, espalda, mandíbula o efra.  · Transpira mucho.  · Tiene ganas de vomitar (náuseas ).  · Le falta el aire estando en reposo.  · Tiene latidos cardíacos irregulares o rápidos.  · Siente dolor en el pecho que no mejora luego de hacer reposo o después de ernestina los medicamentos habituales.  · Se despierta con dolor en el pecho.  · Se siente mareado, se desmaya o siente fatiga extrema.  · Observa que aumenta malik dificultad para respirar lm el descanso, el sueño o cualquier actividad.  · No puede dormir porque no puede respirar.  · Tiene tos frecuente o escupe mario.  · Siente un dolor intenso en la espalda o en el abdomen, tiene náuseas y vomita.  · Presenta debilidad excesiva, mareos, lipotimia o escalofríos.  Cualquiera de estos síntomas puede ser un problema grave que constituye kasie emergencia. No espere para geovany si los síntomas desaparecen. Comuníquese con el servicio de  emergencias de malik localidad (911 en los [place]). Nomaneje usted hasta el hospital.  ASEGÚRESE DE QUE:  · Comprende estas instrucciones.  · Controlará malik enfermedad.  · Solicitará ayuda de inmediato si no mejora o empeora.  Document Released: 04/03/2012 Document Revised: 03/11/2013  ExitCare® Patient Information ©2014 [employer].      Acidez estomacal  (Heartburn)  La acidez estomacal es un tipo de dolor o de molestia que se puede presentar en la garganta o en el pecho. A menudo se la describe sammie kasie quemazón. También puede producir mal aliento. La sensación de acidez puede empeorar al acostarse o inclinarse. Puede deberse al retroceso (reflujo) de los contenidos estomacales hacia el tubo que conecta la boca con el estómago (esófago).  CUIDADOS EN EL HOGAR  Berry estas medidas para aliviar las molestias y evitar los problemas.  Dieta   · Siga la dieta sammie se lo haya indicado el médico. Alfredo vez deba evitar los siguientes alimentos y bebidas:  ¨ Café y té (con o sin cafeína).  ¨ Bebidas que contengan alcohol.  ¨ Bebidas energizantes y deportivas.  ¨ Gaseosas o refrescos.  ¨ Chocolate y cacao.  ¨ Menta y esencias de menta.  ¨ Crimora y cebollas.  ¨ Rábano picante.  ¨ Alimentos muy condimentados y ácidos, sammie pimientos, chile en polvo, amaro en polvo, vinagre, salsas picantes y salsa barbacoa.  ¨ Frutas cítricas y bartolo jugos, sammie naranjas, hieu y prisca.  ¨ Alimentos a base de tomates, sammie salsa tiesha, chile, salsa y pizza con salsa tiesha.  ¨ Alimentos fritos y grasos, sammie rosquillas, german fritas y aderezos con alto contenido de grasa.  ¨ [place] con alto contenido de grasa, sammie hot dogs, filetes de entrecot, salchicha, jamón y tocino.  ¨ Productos lácteos con alto contenido de grasa, sammie leche entera, mantequilla y queso crema.  · Consuma pequeñas porciones de comida con más frecuencia. Evite consumir porciones abundantes.  · Evite beber mucho líquido con las comidas.  · No coma lm las 2 o 3 horas  previas a la hora de acostarse.  · No se acueste inmediatamente después de comer.  · No avi actividad física enseguida después de comer.  Instrucciones generales   · Esté atento a cualquier cambio en los síntomas.  · [place] los medicamentos de venta prabhu y los recetados solamente sammie se lo haya indicado el médico. No tome aspirina, ibuprofeno ni otros antiinflamatorios no esteroides (NILE), a menos que el médico lo autorice.  · No consuma ningún producto que contenga tabaco, lo que incluye cigarrillos, tabaco de mascar y cigarrillos electrónicos. Si necesita ayuda para dejar de fumar, consulte al médico.  · Use ropa suelta. No use nada ajustado alrededor de la cintura.  · Levante (eleve) unas 6 pulgadas (15 centímetros) la cabecera de la cama.  · Intente bajar el nivel de estrés. Si necesita ayuda para hacerlo, consulte al médico.  · Si tiene sobrepeso, adelgace hasta alcanzar un peso saludable. Pregúntele a malik médico cómo puede perder peso de manera ayers.  · Concurra a todas las visitas de control sammie se lo haya indicado el médico. [place] es importante.  SOLICITE AYUDA SI:  · Aparecen nuevos síntomas.  · Baja de peso y no sabe por qué.  · Tiene dificultad para tragar o siente dolor al hacerlo.  · Tiene sibilancias o tos que no desaparece.  · Los síntomas no mejoran con el tratamiento.  · Tiene acidez frecuentemente lm más de dos semanas.  SOLICITE AYUDA DE INMEDIATO SI:  · Tiene dolor en los brazos, el efra, los maxilares, la dentadura o la espalda.  · Transpira, se marea o tiene sensación de desvanecimiento.  · Siente falta de aire o dolor en el pecho.  · Vomita y el vómito es parecido a la mario o a los granos de café.  · Las heces son sanguinolentas o de color vinh.  Esta información no tiene sammie fin reemplazar el consejo del médico. Asegúrese de hacerle al médico cualquier pregunta que tenga.  Document Released: 08/29/2012 Document Revised: 09/07/2016 Document Reviewed: 04/13/2016  Elsevier  Interactive Patient Education © 2017 Elsevier Inc.        Enfermedad por reflujo gastroesofágico en los adultos  (Gastroesophageal Reflux Disease, Adult)  Normalmente, los alimentos descienden por el esófago y se depositan en el estómago para malik digestión. Sin embargo, cuando kasie persona tiene enfermedad por reflujo gastroesofágico (ERGE), los alimentos y el ácido estomacal regresan al esófago. Cuando esto ocurre, el esófago se irrita y se inflama. Con el tiempo, la ERGE puede provocar la formación de pequeñas perforaciones (úlceras) en la mucosa del esófago.  CAUSAS  Un problema del músculo que se encuentra entre el esófago y el estómago (esfínter esofágico inferior o EEI) es la causa de esta enfermedad. Por lo general, el esfínter esofágico inferior se nikhil después de que los alimentos pasan a través del esófago hacia el estómago. Cuando el EEI está debilitado o no es normal, no se nikhil correctamente, lo que permite el paso retrógrado de los alimentos y el ácido estomacal al esófago. Algunas sustancias de la dieta, algunos medicamentos y ciertas enfermedades pueden debilitar camilo esfínter, entre ellos:  · Consumo de tabaco.  · Embarazo.  · Hernia de hiato.  · Consumo excesivo de alcohol.  · Algunos alimentos y ciertas bebidas, sammei el café, el chocolate, las cebollas y la menta.  FACTORES DE RIESGO  Es más probable que esta afección se manifieste en:  · Los personas con sobrepeso.  · Las personas con trastornos del tejido conjuntivo.  · Las personas que kristin antiinflamatorios no esteroides (NILE).  SÍNTOMAS  Los síntomas de esta afección incluyen lo siguiente:  · Acidez estomacal.  · Dificultad o dolor al tragar.  · Sensación de tener un bulto en la garganta.  · Sabor amargo en la boca.  · Mal aliento.  · Gran cantidad de saliva.  · Malestar estomacal o meteorismo.  · Flatulencias.  · Dolor en el pecho.  · Falta de aire o sibilancias.  · Tos permanente (crónica) o tos nocturna.  · Desgaste el esmalte  dental.  · Pérdida de peso.  El dolor en el pecho puede deberse a muchas afecciones diferentes. Consulte al médico si tiene dolor en el pecho.  DIAGNÓSTICO  El médico le hará kasie historia clínica y un examen físico. Para determinar si la ERGE es leve o grave, el médico también puede controlar la respuesta al tratamiento. También pueden hacerle otros estudios, por ejemplo:  · Kasie endoscopia para examinar el estómago y el esófago con kasie pequeña cámara.  · Un estudio que determina el nivel de acidez en el esófago.  · Un estudio que mide la presión que hay en el esófago.  · Un estudio de deglución de bario o un estudio modificado de deglución de bario para mostrar la forma, el tamaño y el funcionamiento del esófago.  TRATAMIENTO  El objetivo del tratamiento es aliviar los síntomas y evitar las complicaciones. El tratamiento de esta afección puede variar en función de la gravedad de los síntomas. El médico podrá indicar lo siguiente:  · Cambios en la dieta.  · Medicamentos.  · Cirugía.  INSTRUCCIONES PARA EL CUIDADO EN EL HOGAR  Dieta   · Siga la dieta que le haya recomendado el médico, la cual puede incluir evitar alimentos y bebidas tales sammie:  ¨ Café y té (con o sin cafeína).  ¨ Bebidas que contengan alcohol.  ¨ Bebidas energizantes y deportivas.  ¨ Gaseosas o refrescos.  ¨ Chocolate y cacao.  ¨ Menta y esencias de menta.  ¨ [place] y cebollas.  ¨ Rábano picante.  ¨ Alimentos muy condimentados y ácidos, entre ellos, pimientos, chile en polvo, amaro en polvo, vinagre, salsas picantes y salsa barbacoa.  ¨ Frutas cítricas y bartolo jugos, sammie naranjas, hieu y prisca.  ¨ Alimentos a base de tomates, sammie salsa tiesha, chile, salsa y pizza con salsa tiesha.  ¨ Alimentos fritos y grasos, sammie rosquillas, german fritas y aderezos con alto contenido de grasa.  ¨ [place] con alto contenido de grasa, sammie hot dogs y gordon grasos de lakes[place]ia contreras y emma, por ejemplo, filetes de entrecot, salchicha, jamón y tocino.  ¨ Productos lácteos  con alto contenido de grasa, sammie leche entera, mantequilla y queso crema.  · Oanh comidas pequeñas y frecuentes lm el día en lugar de comidas abundantes.  · Evite beber mucho líquido con las comidas.  · No coma lm las 2 o 3 horas previas a la hora de acostarse.  · No se acueste inmediatamente después de comer.  · No oanh actividad física enseguida después de comer.  Instrucciones generales   · Esté atento a cualquier cambio en los síntomas.  · [place] los medicamentos de venta prabhu y los recetados solamente sammie se lo haya indicado el médico. No tome aspirina, ibuprofeno ni otros antiinflamatorios no esteroides (NILE), a menos que se lo haya indicado el médico.  · No consuma ningún producto que contenga tabaco, lo que incluye cigarrillos, tabaco de mascar y cigarrillos electrónicos. Si necesita ayuda para dejar de fumar, consulte al médico.  · Use ropas sueltas. No use prendas ajustadas alrededor de la cintura que ejerzan presión en el abdomen.  · Levante (eleve) 6 pulgadas (15 centímetros) la cabecera de la cama.  · Trate de reducir el nivel de estrés con actividades sammie el yoga o la meditación. Si necesita ayuda para reducir el nivel de estrés, consulte al médico.  · Si tiene sobrepeso, adelgace hasta alcanzar un peso saludable. Hable con el médico acerca de malik peso ideal y pídale asesoramiento en cuanto a la dieta que debe seguir para poder alcanzarlo.  · Concurra a todas las visitas de control sammie se lo haya indicado el médico. [place] es importante.  SOLICITE ATENCIÓN MÉDICA SI:  · Aparecen nuevos síntomas.  · Baja de peso sin causa aparente.  · Tiene dificultad para tragar o siente dolor al hacerlo.  · Tiene sibilancias o tos persistente.  · Los síntomas no mejoran con el tratamiento.  · Tiene la voz ronca.  SOLICITE ATENCIÓN MÉDICA DE INMEDIATO SI:  · Tiene dolor en los brazos, el efra, los maxilares, la dentadura o la espalda.  · Transpira, se marea o tiene sensación de desvanecimiento.  · Siente  falta de aire o dolor en el pecho.  · Vomita y el vómito es parecido a la mario o a los granos de café.  · Se desmaya.  · Las heces son sanguinolentas o de color vinh.  · No puede tragar, beber o comer.  Esta información no tiene sammie fin reemplazar el consejo del médico. Asegúrese de hacerle al médico cualquier pregunta que tenga.  Document Released: 09/27/2006 Document Revised: 09/07/2016 Document Reviewed: 04/13/2016  [employer] Interactive Patient Education © 2017 Elsevier Inc.

## 2019-08-23 NOTE — PROGRESS NOTES
Patient discharged, arm band removed, iv removed. Discharge instructions completed with  Raul. Discussed all medication, activity, diet and doctors appointments. Paper prescriptions given to family. Patient and family verbalized understanding and patient was escorted via wheel chair with RN and wife.

## 2019-08-23 NOTE — PROGRESS NOTES
Bedside report received from day shift RN. Pt alert sitting up in bed with no complaints of pain at this time. No signs or symptoms of resp distress noted or reported on RA. Bed in low and locked position, call button within reach and fall precautions are in place.

## 2019-08-23 NOTE — PROGRESS NOTES
Pt is complaining of mild chest pain. STAT EKG ordered and Dr Pimentel notified and new orders obtained for STAT troponin.

## 2019-08-23 NOTE — PROGRESS NOTES
Pt in bed sleeping, wakes easily to voice. Pt reports that he no longer has chest pain. No signs or symptoms of resp distress noted or reported on 2 L/min via NC. Bed in low and locked position, call button within reach and fall precautions are in palce

## 2019-08-23 NOTE — DISCHARGE SUMMARY
Discharge Summary    CHIEF COMPLAINT ON ADMISSION  Chief Complaint   Patient presents with   • Abdominal Pain   • Constipation       Reason for Admission  CP     Admission Date  8/19/2019    CODE STATUS  Full Code    HPI & HOSPITAL COURSE  This is a 69 y.o. male here with  history of CAD, abdominal aortic aneurysm now presents with complaints of chest pain, dyspnea on exertion with elevated troponin and abnormal EKG.     He was seen by cardiology, and recommendation is for stress test which reveals no reversible ischemia with ef 35%.  Per cardiology, recommendation is to continue medication optimization.    He is found to have symptoms consistent with GERD.  He has been educated multiple times regarding avoidance of certain foods which may aggravate symptoms, including avoidance of fatty and spicy food.  A  line has been used during education.    Of note, he is found to have intermittent bradycardia, with associated orthostatic hypotension.  We have decreased dose of beta blocker and he has been educated on movement.        Therefore, he is discharged in good and stable condition to home with close outpatient follow-up.    The patient met 2-midnight criteria for an inpatient stay at the time of discharge.    Discharge Date  8/23    FOLLOW UP ITEMS POST DISCHARGE  Pcp/dc clinic in 1 week  Cardiology in 3-4 weeks    DISCHARGE DIAGNOSES  Principal Problem:    Chest pain POA: Yes  Active Problems:    CAD (coronary artery disease) POA: Yes      Overview: 2009: CABG x 3 in [place].    Hypertension POA: Yes    Mixed hyperlipidemia POA: Yes    Hypokalemia POA: Yes    Chronic renal disease POA: Unknown    Polycythemia POA: Unknown    Thrombocytopenia (HCC) POA: Unknown    Constipation POA: Unknown    GERD (gastroesophageal reflux disease) POA: Unknown    Benign prostatic hyperplasia without lower urinary tract symptoms POA: Yes  Resolved Problems:    * No resolved hospital problems. *      FOLLOW UP  No  future appointments.  Dennis Guajardo M.D.  [address] [place] [id number] [phone]      PLEASE CONTACT PCP OFFICE DIRECTLY TO ARRANGE A FOLLOW UP APPOINTMENT. THANK YOU      MEDICATIONS ON DISCHARGE     Medication List      START taking these medications      Instructions   omeprazole 20 MG delayed-release capsule  Commonly known as:  PRILOSEC   Take 1 Cap by mouth 2 Times a Day.  Dose:  20 mg     spironolactone 25 MG Tabs  Commonly known as:  ALDACTONE   Take 1 Tab by mouth every day.  Dose:  25 mg        CHANGE how you take these medications      Instructions   metoprolol 100 MG Tabs  What changed:  how much to take  Commonly known as:  LOPRESSOR   Take 0.5 Tabs by mouth 2 times a day.  Dose:  50 mg        CONTINUE taking these medications      Instructions   ADALAT CC 30 MG CR tablet  Generic drug:  NIFEdipine   Take 30 mg by mouth every day.  Dose:  30 mg     aspirin 325 MG Tabs  Commonly known as:  ASA   Take 325 mg by mouth every day.  Dose:  325 mg     atorvastatin 10 MG Tabs  Commonly known as:  LIPITOR   Take 10 mg by mouth every evening.  Dose:  10 mg     enalapril 10 MG Tabs  Commonly known as:  VASOTEC   Take 10 mg by mouth every day.  Dose:  10 mg     tamsulosin 0.4 MG capsule  Commonly known as:  FLOMAX   Take 0.4 mg by mouth every day.  Dose:  0.4 mg        STOP taking these medications    ZANTAC 150 MG Tabs  Generic drug:  raNITidine            Allergies  No Known Allergies    DIET  Orders Placed This Encounter   Procedures   • Diet Order Regular, Cardiac     Standing Status:   Standing     Number of Occurrences:   1     Order Specific Question:   Diet:     Answer:   Regular [1]     Order Specific Question:   Diet:     Answer:   Cardiac [6]   • Discontinue Diet Tray     Standing Status:   Standing     Number of Occurrences:   1       ACTIVITY  As tolerated.  Weight bearing as tolerated    CONSULTATIONS  cardiology    PROCEDURES  none    LABORATORY  Lab Results   Component Value Date     SODIUM 135 08/21/2019    POTASSIUM 3.8 08/21/2019    CHLORIDE 104 08/21/2019    CO2 22 08/21/2019    GLUCOSE 111 (H) 08/21/2019    BUN 21 08/21/2019    CREATININE 1.26 08/21/2019        Lab Results   Component Value Date    WBC 8.1 08/20/2019    HEMOGLOBIN 16.2 08/20/2019    HEMATOCRIT 50.3 08/20/2019    PLATELETCT 161 (L) 08/20/2019        Total time of the discharge process exceeds 45 minutes.

## 2019-08-23 NOTE — CARE PLAN
Problem: Knowledge Deficit  Goal: Knowledge of disease process/condition, treatment plan, diagnostic tests, and medications will improve  Outcome: PROGRESSING AS EXPECTED  Note:   Pt updated on POC, tests, and medications. Pt verbalizes understanding and has no further questions at this time. Pt educated on calling for any more questions.        Problem: Discharge Barriers/Planning  Goal: Patient's continuum of care needs will be met  Intervention: Assess potential discharge barriers on admission and throughout hospital stay  Note:   RN will assess possible discharge barriers on admission and throughout hospital stay

## 2019-09-06 ENCOUNTER — OFFICE VISIT (OUTPATIENT)
Dept: CARDIOLOGY | Facility: MEDICAL CENTER | Age: 69
End: 2019-09-06
Payer: MEDICARE

## 2019-09-06 VITALS
SYSTOLIC BLOOD PRESSURE: 122 MMHG | HEIGHT: 72 IN | HEART RATE: 74 BPM | DIASTOLIC BLOOD PRESSURE: 80 MMHG | WEIGHT: 176.37 LBS | OXYGEN SATURATION: 94 % | BODY MASS INDEX: 23.89 KG/M2

## 2019-09-06 DIAGNOSIS — I10 ESSENTIAL HYPERTENSION: ICD-10-CM

## 2019-09-06 DIAGNOSIS — E78.2 MIXED HYPERLIPIDEMIA: ICD-10-CM

## 2019-09-06 DIAGNOSIS — Z79.899 HIGH RISK MEDICATION USE: ICD-10-CM

## 2019-09-06 DIAGNOSIS — I25.10 CORONARY ARTERY DISEASE INVOLVING NATIVE CORONARY ARTERY OF NATIVE HEART WITHOUT ANGINA PECTORIS: ICD-10-CM

## 2019-09-06 PROCEDURE — 99213 OFFICE O/P EST LOW 20 MIN: CPT | Performed by: NURSE PRACTITIONER

## 2019-09-06 RX ORDER — ENALAPRIL MALEATE 10 MG/1
10 TABLET ORAL 2 TIMES DAILY
Qty: 60 TAB | Refills: 11 | Status: SHIPPED | OUTPATIENT
Start: 2019-09-06

## 2019-09-06 ASSESSMENT — ENCOUNTER SYMPTOMS
COUGH: 0
WHEEZING: 0
SPUTUM PRODUCTION: 0
DIZZINESS: 0
CHILLS: 0
CLAUDICATION: 0
HEMOPTYSIS: 0
ORTHOPNEA: 0
PALPITATIONS: 0
NAUSEA: 0
FEVER: 0
SHORTNESS OF BREATH: 0
HEADACHES: 0
PND: 0
VOMITING: 0

## 2019-09-06 NOTE — PROGRESS NOTES
Chief Complaint   Patient presents with   • Hypertension       Subjective:   Danny Antony is a 69 y.o. male who presents today with history of CABG x3 in 2009, AAA status post repair April 2017 and HTN.  The patient was last seen by KRISTAN Welsh 10/31/2017.    More recently the patient was hospitalized 8/19-23/19 for chest pain associated with ambulation and also developed with meals.  Troponins during hospitalization were indeterminant and patient had MPI which showing large inferolateral infarct with no evidence of reversible ischemia, global hypokinesis and EF 36%.    Patient was discharged home and had follow-up with primary care.  Primary care started him on MiraLAX and he has had no chest pain since then.    Since the patient's hospitalization he has had no chest pain whatsoever.  He does not check his blood pressures on a regular basis.  I have asked him to obtain blood pressure cuff so that he can monitor them.  Patient reports that he was orthostatic during hospitalization so I strongly encouraged he obtain blood pressure monitor.    Past Medical History:   Diagnosis Date   • AAA (abdominal aortic aneurysm) ([facility]) 04/2017    Status post repair by Dr. Kerr.   • Abdominal pain    • Aneurysm ([facility])    • Back pain    • BPH (benign prostatic hyperplasia)    • CAD (coronary artery disease) 2009    Status post CABG x 3, in [place]   • Eye pain    • Fatigue    • Hyperlipidemia    • Hypertension    • Lumps on the skin    • MI (myocardial infarction) ([facility]) 2013   • Snoring    • Vision loss    • Weakness      Past Surgical History:   Procedure Laterality Date   • ABDOMINAL AORTIC ANEURYSM N/A 4/11/2017    Procedure: ABDOMINAL AORTIC ANEURYSM;  Surgeon: Charissa Kerr M.D.;  Location: SURGERY [facility];  Service:    • MULTIPLE CORONARY ARTERY BYPASS  2009    CABG x 3, in [place]     Family History   Problem Relation Age of Onset   • Diabetes Mother    • Lung Cancer Father    • Hypertension Sister     • Diabetes Brother      Social History     Socioeconomic History   • Marital status:      Spouse name: Not on file   • Number of children: Not on file   • Years of education: Not on file   • Highest education level: Not on file   Occupational History   • Not on file   Social Needs   • Financial resource strain: Not on file   • Food insecurity:     Worry: Not on file     Inability: Not on file   • Transportation needs:     Medical: Not on file     Non-medical: Not on file   Tobacco Use   • Smoking status: Former Smoker     Packs/day: 1.00     Years: 25.00     Pack years: 25.00     Types: Cigarettes     Last attempt to quit: 3/2/2009     Years since quitting: 10.5   • Smokeless tobacco: Never Used   Substance and Sexual Activity   • Alcohol use: No     Comment: use to drink 2 beers a day quit 03/2009   • Drug use: No   • Sexual activity: Not Currently   Lifestyle   • Physical activity:     Days per week: Not on file     Minutes per session: Not on file   • Stress: Not on file   Relationships   • Social connections:     Talks on phone: Not on file     Gets together: Not on file     Attends Confucianist service: Not on file     Active member of club or organization: Not on file     Attends meetings of clubs or organizations: Not on file     Relationship status: Not on file   • Intimate partner violence:     Fear of current or ex partner: Not on file     Emotionally abused: Not on file     Physically abused: Not on file     Forced sexual activity: Not on file   Other Topics Concern   • Not on file   Social History Narrative   • Not on file     No Known Allergies  Outpatient Encounter Medications as of 9/6/2019   Medication Sig Dispense Refill   • Polyethylene Glycol 3350 (MIRALAX PO) Take  by mouth.     • enalapril (VASOTEC) 10 MG Tab Take 1 Tab by mouth 2 times a day. 60 Tab 11   • metoprolol (LOPRESSOR) 100 MG Tab Take 0.5 Tabs by mouth 2 times a day. 60 Tab 0   • omeprazole (PRILOSEC) 20 MG delayed-release  capsule Take 1 Cap by mouth 2 Times a Day. 30 Cap 1   • spironolactone (ALDACTONE) 25 MG Tab Take 1 Tab by mouth every day. 30 Tab 3   • atorvastatin (LIPITOR) 10 MG Tab Take 10 mg by mouth every evening.     • NIFEdipine (ADALAT CC) 30 MG CR tablet Take 30 mg by mouth every day.     • tamsulosin (FLOMAX) 0.4 MG capsule Take 0.4 mg by mouth every day.     • aspirin (ASA) 325 MG Tab Take 325 mg by mouth every day.     • [DISCONTINUED] enalapril (VASOTEC) 10 MG Tab Take 10 mg by mouth every day.       No facility-administered encounter medications on file as of 9/6/2019.      Review of Systems   Constitutional: Negative for chills and fever.   Respiratory: Negative for cough, hemoptysis, sputum production, shortness of breath and wheezing.    Cardiovascular: Negative for chest pain, palpitations, orthopnea, claudication, leg swelling and PND.   Gastrointestinal: Negative for nausea and vomiting.   Neurological: Negative for dizziness and headaches.   All other systems reviewed and are negative.       Objective:   /80 (BP Location: Left arm, Patient Position: Sitting, BP Cuff Size: Adult)   Pulse 74   Ht 1.829 m (6')   Wt 80 kg (176 lb 5.9 oz)   SpO2 94%   BMI 23.92 kg/m²     Physical Exam   Constitutional: He appears well-developed and well-nourished.   Eyes: EOM are normal.   Neck: Neck supple. No JVD present.   Cardiovascular: Normal rate, regular rhythm and normal heart sounds.   No murmur heard.  Pulmonary/Chest: Effort normal and breath sounds normal.   Abdominal: Soft.   Neurological:   CN II-XII grossly intact   Skin: Skin is warm and dry.   Psychiatric: He has a normal mood and affect. His behavior is normal. Judgment and thought content normal.   Nursing note and vitals reviewed.     Myocardial Perfusion Report 8/20/2019   NUCLEAR IMAGING INTERPRETATION   1. Large inferolateral infarct.   2. No evidence of reversible defect.   3. Global hypokinesis with EF 36%   ECG INTERPRETATION   Negative stress  ECG for ischemia.    Echocardiogram 8/19/2019  CONCLUSIONS  Prior echo on 8/28/17.  The left ventricle is mildly dilated.  Left ventricular ejection fraction is visually estimated to be 40%.  Grade II diastolic dysfunction.  Reduced right ventricular systolic function.  Mild mitral regurgitation.  Compared to the report of the prior study done -the LVEF is now 40%   previously 50%.  Regional wall motion abnormalities are unchanged.     Assessment:     1. Coronary artery disease involving native coronary artery of native heart without angina pectoris     2. Essential hypertension     3. Mixed hyperlipidemia     4. High risk medication use  Basic Metabolic Panel       Medical Decision Making:  Today's Assessment / Status / Plan:   1.  CAD  - Continue  mg, metoprolol 100 mg twice daily, and atorvastatin 10 mg every evening  -Chest pain-free    2.  HTN  -Obtain blood pressure monitor  -Continue metoprolol 100 mg twice daily, Spironolactone 25 mg daily, and nifedipine 30 mg CR daily  - Increase enalapril 10 mg twice daily    3.  Mixed hyperlipidemia  -Continue atorvastatin, recommend increasing dose at next visit    4.  High risk medications  -BMP to check kidney function and electrolytes    Collaborating MD is Dr. Martini.  Follow-up visit in 6 months with Dr York.    Please note that this dictation was created using voice recognition software.  I have made every reasonable attempt to correct obvious errors, but it is possible there are errors of grammar or possibly content that I did not discover before finalizing the note.

## 2019-09-10 DIAGNOSIS — Z79.899 HIGH RISK MEDICATION USE: ICD-10-CM

## 2019-09-13 ENCOUNTER — TELEPHONE (OUTPATIENT)
Dept: CARDIOLOGY | Facility: MEDICAL CENTER | Age: 69
End: 2019-09-13

## 2019-09-13 DIAGNOSIS — I25.2 OLD MI (MYOCARDIAL INFARCTION): ICD-10-CM

## 2019-09-13 DIAGNOSIS — Z95.1 S/P CABG X 3: ICD-10-CM

## 2019-09-13 DIAGNOSIS — Z79.899 HIGH RISK MEDICATION USE: ICD-10-CM

## 2019-09-13 DIAGNOSIS — I10 ESSENTIAL HYPERTENSION: ICD-10-CM

## 2019-09-13 DIAGNOSIS — I25.10 CORONARY ARTERY DISEASE INVOLVING NATIVE CORONARY ARTERY OF NATIVE HEART WITHOUT ANGINA PECTORIS: ICD-10-CM

## 2019-09-13 DIAGNOSIS — E78.2 MIXED HYPERLIPIDEMIA: ICD-10-CM

## 2019-09-13 NOTE — TELEPHONE ENCOUNTER
GENET Rivero.  Rin Fajardo R.N.             Please inform pt labs look good. Please recheck in 1 month.        BMP ordered for expected date in 1 month. Lab slip mailed to pt.  Attempted to call [email or web address] for him to call back. [employer] message sent.

## 2020-07-16 NOTE — PROGRESS NOTES
Pt alert and oriented x4.  Y[place]i speaking but able to make needs known.  Computer at bedside for interpr if needed.  Pt denies chest pain and abdominal pain.  Pt had 2 hard BMs and states he's feeling better.   Duration Of Freeze Thaw-Cycle (Seconds): 0 Render Note In Bullet Format When Appropriate: No Post-Care Instructions: I reviewed with the patient in detail post-care instructions. Patient is to wear sunprotection, and avoid picking at any of the treated lesions. Pt may apply Vaseline to crusted or scabbing areas. Detail Level: Detailed Consent: The patient's consent was obtained including but not limited to risks of crusting, scabbing, blistering, scarring, darker or lighter pigmentary change, recurrence, incomplete removal and infection.

## 2021-03-03 DIAGNOSIS — Z23 NEED FOR VACCINATION: ICD-10-CM

## 2025-02-19 NOTE — CARE PLAN
Problem: Communication  Goal: The ability to communicate needs accurately and effectively will improve  Intervention: Educate patient and significant other/support system about the plan of care, procedures, treatments, medications and allow for questions  Discussed POC with patient and family members.  Primarily Kiswahili speaking -  used for communication.        Problem: Pain Management  Goal: Pain level will decrease to patient’s comfort goal  Intervention: Follow pain managment plan developed in collaboration with patient and Interdisciplinary Team  Epidural in place running at 5 mL/hr.  Scheduled Tylenol given per MAR.  Reports adequate pain control.             Pt to CT

## (undated) DEVICE — KIT RADIAL ARTERY 20GA W/MAX BARRIER AND BIOPATCH  (5EA/CA) #10740 IS FOR THE SET RADIAL ARTERIAL

## (undated) DEVICE — SENSOR SPO2 NEO LNCS ADHESIVE (20/BX) SEE USER NOTES

## (undated) DEVICE — CLIP MED LG INTNL HRZN TI ESCP - (20/BX)

## (undated) DEVICE — SLEEVE, VASO, THIGH, MED

## (undated) DEVICE — SUTURE 3-0 VICRYL PLUS SH - 8X 18 INCH (12/BX)

## (undated) DEVICE — SUTURE 2-0 SILK 12 X 18" (36PK/BX)"

## (undated) DEVICE — TOWELS CLOTH SURGICAL - (4/PK 20PK/CA)

## (undated) DEVICE — DRAPE LARGE 3 QUARTER - (20/CA)

## (undated) DEVICE — BAG, SPONGE COUNT 50600

## (undated) DEVICE — DRAPE IOBAN II 23 IN X 33 IN - (10/BX)

## (undated) DEVICE — MASK ANESTHESIA ADULT  - (100/CA)

## (undated) DEVICE — TUBE E-T HI-LO CUFF 7.5MM (10EA/PK)

## (undated) DEVICE — NEPTUNE 4 PORT MANIFOLD - (20/PK)

## (undated) DEVICE — SHEET CARDIOVASCULAR - (4/CA)

## (undated) DEVICE — SOD. CHL. INJ. 0.9% 1000 ML - (14EA/CA 60CA/PF)

## (undated) DEVICE — TUBING CLEARLINK DUO-VENT - C-FLO (48EA/CA)

## (undated) DEVICE — CANISTER SUCTION 3000ML MECHANICAL FILTER AUTO SHUTOFF MEDI-VAC NONSTERILE LF DISP  (40EA/CA)

## (undated) DEVICE — KIT CATHETERIZATION TWO-LUMEN VENOUS 8FR (5EA/CA)

## (undated) DEVICE — SUTURE 4-0 MONOCRYL PLUS PS-1 - 27 INCH (36/BX)

## (undated) DEVICE — TRAY SURESTEP FOLEY TEMP SENSING 16FR (10EA/CA) ORDER  #18764 FOR TEMP FOLEY ONLY

## (undated) DEVICE — VESSELOOP SUPER MAXI BLUE - SURG-I-LOOP (2EA/PK 10PK/BX)

## (undated) DEVICE — KIT ROOM DECONTAMINATION

## (undated) DEVICE — SUTURE 2-0 SILK SH 24 (36PK/BX)"

## (undated) DEVICE — CELLSAVER PACK

## (undated) DEVICE — SLEEVE STERILE  A599T - 30/BX 2BX/CS

## (undated) DEVICE — SUTURE 2-0 VICRYL PLUS CT-1 36 (36PK/BX)"

## (undated) DEVICE — GLOVE BIOGEL SZ 6.5 SURGICAL PF LTX (50PR/BX 4BX/CA)

## (undated) DEVICE — SYRINGE SAFETY 10 ML 18 GA X 1 1/2 BLUNT LL (100/BX 4BX/CA)

## (undated) DEVICE — CLIP LG INTNL HRZN TI ESCP LGT - (20/BX)

## (undated) DEVICE — SUTURE 6-0 PROLENE BV-1 D/A 30 (36PK/BX)"

## (undated) DEVICE — COVER LIGHT HANDLE FLEXIBLE - SOFT (2EA/PK 80PK/CA)

## (undated) DEVICE — SYRINGE SAFETY 3 ML 18 GA X 1 1/2 BLUNT LL (100/BX 8BX/CA)

## (undated) DEVICE — WATER IRRIG. STER. 1500 ML - (9/CA)

## (undated) DEVICE — GLOVE BIOGEL INDICATOR SZ 8 SURGICAL PF LTX - (50/BX 4BX/CA)

## (undated) DEVICE — STAPLER SKIN DISP - (6/BX 10BX/CA) VISISTAT

## (undated) DEVICE — PROTECTOR ULNA NERVE - (36PR/CA)

## (undated) DEVICE — SET FLUID WARMING STANDARD FLOW - (10/CA)

## (undated) DEVICE — SET EXTENSION WITH 2 PORTS (48EA/CA) ***PART #2C8610 IS A SUBSTITUTE*****

## (undated) DEVICE — SUTURE 3-0 SILK 12 X 18 IN - (36/BX)

## (undated) DEVICE — GLOVE BIOGEL SZ 7.5 SURGICAL PF LTX - (50PR/BX 4BX/CA)

## (undated) DEVICE — BLADE SURGICAL #11 - (50/BX)

## (undated) DEVICE — BOVIE  BLADE 6 EXTENDED - (50/PK)

## (undated) DEVICE — SODIUM CHL. INJ. 0.9% 500ML (24EA/CA 50CA/PF)

## (undated) DEVICE — SUTURE 3-0 PROLENE SH 36 (36PK/BX)"

## (undated) DEVICE — TRANSDUCER ADULT DISP. SINGLE BONDED STERILE - (20EA/CA)

## (undated) DEVICE — KIT INTROPERCUTANEOUS SHEATH - 8.5 FR W/MAX BARRIER AND BIOPATCH  (5/CA)

## (undated) DEVICE — SET BIFURCATED BLOOD - (48EA/CS)

## (undated) DEVICE — SPONGE RADIOPAQUE CTN X-LG - STERILE (50PK/CA) MADE TO ORDER ITEM AND HAS A 4-6 WEEK LEAD TIME

## (undated) DEVICE — SUTURE 4-0 PROLENE RB-1 D/A 36 (36PK/BX)"

## (undated) DEVICE — PAD LAP STERILE 18 X 18 - (5/PK 40PK/CA)

## (undated) DEVICE — SUTURE 5-0 PROLENE C-1 -(36PK/BX)

## (undated) DEVICE — GLOVE BIOGEL PI INDICATOR SZ 7.0 SURGICAL PF LF - (50/BX 4BX/CA)

## (undated) DEVICE — SODIUM CHL IRRIGATION 0.9% 1000ML (12EA/CA)

## (undated) DEVICE — TRAY ANESTHESIA - CONTINUOUS EPIDURAL (10EA/CA) THIS IS A CUSTOM PACK

## (undated) DEVICE — GOWN WARMING STANDARD FLEX - (30/CA)

## (undated) DEVICE — GOWN SURGEONS X-LARGE - DISP. (30/CA)

## (undated) DEVICE — BAG ISOLATION 20X20 INVISI - SHEILD (10EA/BX)

## (undated) DEVICE — BAG RESUSCITATION DISPOSABLE - WITH MASK (10 EA/CA)

## (undated) DEVICE — BAG DECANTER (50EA/CS)

## (undated) DEVICE — SUTURE CV

## (undated) DEVICE — CLIP SM INTNL HRZN TI ESCP LGT - (24EA/PK 25PK/BX)

## (undated) DEVICE — PACK MAJOR BASIN - (2EA/CA)

## (undated) DEVICE — LACTATED RINGERS INJ 1000 ML - (14EA/CA 60CA/PF)

## (undated) DEVICE — CATHETER FOLEY ROBINSON 16FR 16IN STRL (12EA/CA)

## (undated) DEVICE — SET LEADWIRE 5 LEAD BEDSIDE DISPOSABLE ECG (1SET OF 5/EA)

## (undated) DEVICE — DRAPE MAYO STAND - (30/CA)

## (undated) DEVICE — TUBE NG SALEM SUMP 16FR (50EA/CA)

## (undated) DEVICE — SUCTION INSTRUMENT YANKAUER BULBOUS TIP W/O VENT (50EA/CA)

## (undated) DEVICE — VESSELOOP MAXI BLUE STERILE- SURG-I-LOOP (10EA/BX)

## (undated) DEVICE — SYRINGE 20 ML LL (50EA/BX 4BX/CA)

## (undated) DEVICE — BLOCK

## (undated) DEVICE — GELAQUASONIC 100 ULTRASOUND - 48/BX 20GM STERILE FOIL POUCH

## (undated) DEVICE — DRESSING LEUKOMED STERILE 11.75X4IN - (50/CA)

## (undated) DEVICE — SUTURE 1 PROLENE CTX (36PK/BX)

## (undated) DEVICE — FILTER BLOOD TRANSFUSION - (40/CA) (PALL)

## (undated) DEVICE — KIT ANESTHESIA W/CIRCUIT & 3/LT BAG W/FILTER (20EA/CA)

## (undated) DEVICE — ELECTRODE 850 FOAM ADHESIVE - HYDROGEL RADIOTRNSPRNT (50/PK)

## (undated) DEVICE — CHLORAPREP 26 ML APPLICATOR - ORANGE TINT(25/CA)

## (undated) DEVICE — SUTURE 1 PROLENE TP-1 60 (12PK/BX)"

## (undated) DEVICE — Device

## (undated) DEVICE — GLOVE BIOGEL SZ 8 SURGICAL PF LTX - (50PR/BX 4BX/CA)

## (undated) DEVICE — VESSELOOP MINI BLUE STERILE - SURG-I-LOOP (10EA/BX)

## (undated) DEVICE — SUTURE GENERAL

## (undated) DEVICE — CLIP MED INTNL HRZN TI ESCP - (25/BX)

## (undated) DEVICE — SUTURE 2-0 PROLENE SH D/A (36PK/BX)

## (undated) DEVICE — SURGIFOAM (SIZE 100) - (6EA/CA)

## (undated) DEVICE — CELLSAVER STAT

## (undated) DEVICE — HEAD HOLDER JUNIOR/ADULT

## (undated) DEVICE — SUTURE 3-0 VICRYL PLUS SH - 27 INCH (36/BX)

## (undated) DEVICE — LEAD SET 6 DISP. EKG NIHON KOHDEN